# Patient Record
Sex: FEMALE | Employment: OTHER | URBAN - METROPOLITAN AREA
[De-identification: names, ages, dates, MRNs, and addresses within clinical notes are randomized per-mention and may not be internally consistent; named-entity substitution may affect disease eponyms.]

---

## 2017-01-03 ENCOUNTER — HOSPITAL ENCOUNTER (OUTPATIENT)
Dept: PHYSICAL THERAPY | Age: 77
Discharge: HOME | End: 2017-01-03
Payer: MEDICARE

## 2017-01-03 PROCEDURE — 92507 TX SP LANG VOICE COMM INDIV: CPT

## 2017-01-03 NOTE — PROGRESS NOTES
[address] [place] [address]. Speech Language Pathology: Daily Note      Patient Name: Pete Manriquez   1/3/2017   : 1940  [x]  Patient  Verified  Payor: Vic Gill / Plan: [place] MEDIC[place] & [place] / Product Type: Medicare /   In time:0900  Out time:1000  Total Treatment Time (min): 60  1:1 Treatment Time ( [address] Only): 60  Visit #: 4 of 8-10    SUBJECTIVE  Pain Level (0-10 scale): 0    Subjective functional status/changes:   Patient reported speech production is very inconsistent. Patient's cousin reported speech has improved immensly. OBJECTIVE  Treatment provided includes the following. Increase/Improve:  []  Voice Quality []  Expressive Language []  Oral Motor Skills   []  Vocal Loudness []  Auditory Comprehension []  Eating/Swallowing Skills   []  Vocal Cord Function []  Writing Skills []  Laryngeal/Pharyngeal Function   []  Resonance []  Reading Comprehension []   []  Breath Support/Coord. []  Cognitive-Linguistic Skills []   [x]  Speech Intelligibility []  Safety Awareness []   [x]  Articulation []  Attention []   []  Fluency []  Memory []     Decrease:  []  Dysphagia [x]  Apraxia []  Dysphonia   [x]  Dysarthria []  Dysfluency []  Cognitive Ling. Deficit   []  Aphasia []  Vocal Cord Dysfunction []  Dysphonia     Tasks Completed:  -2 syllable words 75% accuracy  -3 syllable words with 50% accuracy independently, 90% with mod-maxA    Progress towards goals:  Patient is demonstrating slow, but steady progress towards goals. Patient benefits from cues to decrease rate and break words into syllables. Patient has improved oral motor control with simple words and phrases in unstructured conversation. Patient has moments of speech (1-2 sentences) of no phonemic mis articulations. This demonstrates speech control is carrying over.  Patient would continue to benefit from skilled ST services to address speech deficits      HEP: 2 and 3 syllable words. Patient/Caregiver instruction/education: importance of completing HEP for carryover; pt verbalized comprehension. (minutes:10)        Pain Level (0-10 scale) post treatment: 0    ASSESSMENT  []   Improving appropriately and progressing toward goals  [x]   Improving slowly and progressing toward goals  []   Approximating goals/maximum potential  [x]   Continues to benefit from skilled therapy to address remaining functional deficits  []   Not progressing toward goals and plan of care will be adjusted    PLAN   [x]  Continue Plan of Care    []  See progress note/recertification  [x]  Upgrade activities as tolerated      []  Discharge due to:  []  Other:    Short-term Goals:  1. Complete 3 part commands with 80% accuracy with Vipul. 2. Produce 2 syllable words with 90% accuracy to improve functional communication with Vipul. 3. Produce 3 syllable words with 90% accuracy to improve functional communication with Vipul. 4. Produce phrases with 80% accuracy to improve functional communication with modA and models.     Leopold Lager M.S. SLP  1/3/2017, 8:52 AM

## 2017-01-05 ENCOUNTER — APPOINTMENT (OUTPATIENT)
Dept: PHYSICAL THERAPY | Age: 77
End: 2017-01-05
Payer: MEDICARE

## 2017-01-10 ENCOUNTER — APPOINTMENT (OUTPATIENT)
Dept: PHYSICAL THERAPY | Age: 77
End: 2017-01-10
Payer: MEDICARE

## 2017-01-12 ENCOUNTER — APPOINTMENT (OUTPATIENT)
Dept: PHYSICAL THERAPY | Age: 77
End: 2017-01-12
Payer: MEDICARE

## 2017-01-16 ENCOUNTER — OFFICE VISIT (OUTPATIENT)
Dept: FAMILY MEDICINE CLINIC | Age: 77
End: 2017-01-16

## 2017-01-16 ENCOUNTER — HOSPITAL ENCOUNTER (OUTPATIENT)
Dept: LAB | Age: 77
Discharge: HOME | End: 2017-01-16
Payer: MEDICARE

## 2017-01-16 VITALS
SYSTOLIC BLOOD PRESSURE: 142 MMHG | DIASTOLIC BLOOD PRESSURE: 76 MMHG | OXYGEN SATURATION: 95 % | TEMPERATURE: 98.3 F | WEIGHT: 117.2 LBS | HEIGHT: 64 IN | HEART RATE: 68 BPM | BODY MASS INDEX: 20.01 KG/M2

## 2017-01-16 DIAGNOSIS — E03.8 SUBCLINICAL HYPOTHYROIDISM: ICD-10-CM

## 2017-01-16 DIAGNOSIS — R35.0 FREQUENT URINATION: Primary | ICD-10-CM

## 2017-01-16 DIAGNOSIS — R32 URINARY INCONTINENCE, UNSPECIFIED TYPE: ICD-10-CM

## 2017-01-16 DIAGNOSIS — F41.8 DEPRESSION WITH ANXIETY: ICD-10-CM

## 2017-01-16 DIAGNOSIS — R79.89 ELEVATED TSH: ICD-10-CM

## 2017-01-16 DIAGNOSIS — R41.0 CONFUSION: ICD-10-CM

## 2017-01-16 LAB
BILIRUB UR QL STRIP: NEGATIVE
GLUCOSE UR-MCNC: NEGATIVE MG/DL
KETONES P FAST UR STRIP-MCNC: NEGATIVE MG/DL
PH UR STRIP: 5.5 [PH] (ref 4.6–8)
PROT UR QL STRIP: NEGATIVE MG/DL
SP GR UR STRIP: 1.03 (ref 1–1.03)
T3FREE SERPL-MCNC: 2.8 PG/ML (ref 2.3–4.2)
T4 FREE SERPL-MCNC: 0.9 NG/DL (ref 0.7–1.5)
TSH SERPL DL<=0.05 MIU/L-ACNC: 5.3 UIU/ML (ref 0.36–3.74)
UA UROBILINOGEN AMB POC: NORMAL (ref 0.2–1)
URINALYSIS CLARITY POC: CLEAR
URINALYSIS COLOR POC: YELLOW
URINE BLOOD POC: NORMAL
URINE LEUKOCYTES POC: NEGATIVE
URINE NITRITES POC: NEGATIVE

## 2017-01-16 PROCEDURE — 84443 ASSAY THYROID STIM HORMONE: CPT | Performed by: INTERNAL MEDICINE

## 2017-01-16 PROCEDURE — 84439 ASSAY OF FREE THYROXINE: CPT | Performed by: INTERNAL MEDICINE

## 2017-01-16 PROCEDURE — 84481 FREE ASSAY (FT-3): CPT | Performed by: INTERNAL MEDICINE

## 2017-01-16 PROCEDURE — 36415 COLL VENOUS BLD VENIPUNCTURE: CPT | Performed by: INTERNAL MEDICINE

## 2017-01-16 PROCEDURE — 86376 MICROSOMAL ANTIBODY EACH: CPT | Performed by: INTERNAL MEDICINE

## 2017-01-16 RX ORDER — AMOXICILLIN AND CLAVULANATE POTASSIUM 500; 125 MG/1; MG/1
1 TABLET, FILM COATED ORAL 2 TIMES DAILY
Qty: 14 TAB | Refills: 0 | Status: SHIPPED | OUTPATIENT
Start: 2017-01-16 | End: 2017-01-23

## 2017-01-16 NOTE — PROGRESS NOTES
Scott Dolan is a 64 y.o. female  Chief Complaint   Patient presents with    Bladder Infection     1. Have you been to the ER, urgent care clinic since your last visit? Hospitalized since your last visit? No    2. Have you seen or consulted any other health care providers outside of the [address] [place] [address] since your last visit? Include any pap smears or colon screening.  No

## 2017-01-16 NOTE — MR AVS SNAPSHOT
Visit Information Date & Time Provider Department Dept. Phone Encounter #  
 1/16/2017 11:30 AM Kyler Rutledge [facility] [phone] [id number] Your Appointments 1/27/2017  8:00 AM  
Follow Up with Kyler Rutledge MD  
[facility] [address] [place] [address]) Appt Note: 1 month f/u  
 [address] [facility] [id number] [phone]  
  
   
 [address] [facility]  
  
    
 6/5/2017 10:30 AM  
Follow Up with Kyler Rutledge MD  
[facility] [address] [place] [address]) Appt Note: F/u 6 months [address] [facility] [id number] [phone] Upcoming Health Maintenance Date Due ZOSTER VACCINE AGE 60> 7/23/1998 GLAUCOMA SCREENING Q2Y 7/23/2003 Pneumococcal 65+ Low/Medium Risk (2 of 2 - PCV13) 11/14/2017 MEDICARE YEARLY EXAM 12/10/2017 DTaP/Tdap/Td series (2 - Td) 11/14/2026 Allergies as of 1/16/2017  Review Complete On: 1/16/2017 By: Marilee Davalos LPN Severity Noted Reaction Type Reactions Codeine  10/20/2016    Itching Current Immunizations  Never Reviewed Name Date Pneumococcal Polysaccharide (PPSV-23) 11/14/2016 Not reviewed this visit You Were Diagnosed With   
  
 Codes Comments Frequent urination    -  Primary ICD-10-CM: R35.0 ICD-9-CM: 788.41 Urinary incontinence, unspecified type     ICD-10-CM: R32 
ICD-9-CM: 788.30 Elevated TSH     ICD-10-CM: R94.6 ICD-9-CM: 794.5 Confusion     ICD-10-CM: R41.0 ICD-9-CM: 298.9 Depression with anxiety     ICD-10-CM: F41.8 ICD-9-CM: 300.4 Vitals BP Pulse Temp Height(growth percentile) Weight(growth percentile) SpO2  
 142/76 (BP 1 Location: Left arm, BP Patient Position: Sitting) 68 98.3 °F (36.8 °C) 5' 4\" (1.626 m) 117 lb 3.2 oz (53.2 kg) 95% BMI OB Status Smoking Status 20.12 kg/m2 Hysterectomy Former Smoker BMI and BSA Data Body Mass Index Body Surface Area  
 20.12 kg/m 2 1.55 m 2 Preferred Pharmacy Pharmacy Name Phone [address], [address] [facility] [address] [phone] Your Updated Medication List  
  
   
This list is accurate as of: 1/16/17 12:00 PM.  Always use your most recent med list.  
  
  
  
  
 amoxicillin-clavulanate 500-125 mg per tablet Commonly known as:  AUGMENTIN Take 1 Tab by mouth two (2) times a day for 7 days. LaMICtal 25 mg tablet Generic drug:  lamoTRIgine Take 12.5 mg by mouth daily. mirabegron ER 25 mg ER tablet Commonly known as:  MYRBETRIQ Take 1 Tab by mouth daily. Prescriptions Sent to Pharmacy Refills  
 amoxicillin-clavulanate (AUGMENTIN) 500-125 mg per tablet 0 Sig: Take 1 Tab by mouth two (2) times a day for 7 days. Class: Normal  
 Pharmacy: [facility] [address] [place] [address], [address] [place] [address] AT [address] Ph #: [phone] Route: Oral  
  
We Performed the Following AMB POC URINALYSIS DIP STICK AUTO W/O MICRO [27624 CPT(R)] To-Do List   
 01/17/2017 9:00 AM  
  Appointment with Danika Maria at [facility] PT [address]  ([phone])  
  
 01/24/2017 9:00 AM  
  Appointment with Danika Maria [facility] PT [address] IM ([phone])  
  
 01/31/2017 9:00 AM  
  Appointment with Danika Maria [facility] PT [address] IM ([phone]) Introducing [facility]! Shruthi Sanchez introduces [employer] patient portal. Now you can access parts of your medical record, email your doctor's office, and request medication refills online. 1. In your internet browser, go to [email or web address] [employer] 2. Click on the First Time User? Click Here link in the Sign In box. You will see the New Member Sign Up page. 3. Enter your [employer] Access Code exactly as it appears below. You will not need to use this code after youve completed the sign-up process. If you do not sign up before the expiration date, you must request a new code. · [employer] Access Code: [email or web address] Expires: 1/18/2017  7:17 AM 
 
4. Enter the last four digits of your Social Security Number (xxxx) and Date of Birth (mm/dd/yyyy) as indicated and click Submit. You will be taken to the next sign-up page. 5. Create a T[employer] ID. This will be your [employer] login ID and cannot be changed, so think of one that is secure and easy to remember. 6. Create a [employer] password. You can change your password at any time. 7. Enter your Password Reset Question and Answer. This can be used at a later time if you forget your password. 8. Enter your e-mail address. You will receive e-mail notification when new information is available in [address] 9. Click Sign Up. You can now view and download portions of your medical record. 10. Click the Download Summary menu link to download a portable copy of your medical information. If you have questions, please visit the Frequently Asked Questions section of the [employer] website. Remember, [employer] is NOT to be used for urgent needs. For medical emergencies, dial 911. Now available from your iPhone and Android! Please provide this summary of care documentation to your next provider. Your primary care clinician is listed as Perla 13. If you have any questions after today's visit, please call [phone].

## 2017-01-16 NOTE — PROGRESS NOTES
Assessment/Plan:    1. Frequent urination, urine incontinence- seemed to resolve after last uti was treated.  -u/a positive for blood. Will retreat. Unfortunately, I can't send for culture b/c office is out of urine specimen cups. - AMB POC URINALYSIS DIP STICK AUTO W/O MICRO    2. Elevated TSH  -h/o being treated. Will likely need synthroid. Ck labs. 4. Confusion  -could be related to UTI or depression. However, I am concerned for neurologic process given gait instability (?NPH). Monitor closely. If doesn't improve after UTI treated, will do MRI head. 5. Depression with anxiety  - worsened over past week. Consider rx,however, would like to treat thyroid first to see if that helps. The plan was discussed with the patient. The patient verbalized understanding and is in agreement with the plan. All medication potential side effects were discussed with the patient. Health Maintenance:   Health Maintenance   Topic Date Due    ZOSTER VACCINE AGE 60>  07/23/1998    GLAUCOMA SCREENING Q2Y  07/23/2003    Pneumococcal 65+ Low/Medium Risk (2 of 2 - PCV13) 11/14/2017    MEDICARE YEARLY EXAM  12/10/2017    DTaP/Tdap/Td series (2 - Td) 11/14/2026    OSTEOPOROSIS SCREENING (DEXA)  Completed    INFLUENZA AGE 9 TO ADULT  Addressed       Gatito Gottlieb is a 64 y.o. female and presents with Bladder Infection     Subjective:  Pt recently treated for UTI 12/30 for urine incontinence. Daughter reports her sx improved, but then returned a few days after starting. Also having pelvic pain. Her daughter notes 5 day h/o worsening confusion. Patient acknowledges some confusion as well. No f/c. Her daughter notes gait instability that is worse as well. These sx started after she got a phone call from the office reminding her of her appointment. Her daughter states she \"couldn't handle the phone call\" b/c of dysarthria (word finding difficulty).   The daughter reports she went and laid down after that and stayed in bed for 2 days. Her daughter notes she's not sleeping well for the past 5 days as well. She will occ nap during the daytime. She has several awakenings at night. She states she's unhappy b/c she feels like she's a burden to her family. She admits to depression and anxiety. She is hesitant to take meds, although her daughter thinks she needs them. her tsh has been high. Notes she was on thyroid meds many years ago. ROS:  Constitutional: No recent weight change. No weakness/fatigue. No f/c. Cardiovascular: No CP/palpitations. No ALY/orthopnea/PND. Respiratory: No cough/sputum, dyspnea, wheezing. Gastointestinal: No dysphagia, reflux. No n/v. No constipation/diarrhea. No melena/rectal bleeding. Genitourinary: No dysuria, urinary hesitancy, nocturia, hematuria.  + incontinence. Neurological: No seizures/numbness/weakness. No paresthesias. The problem list was updated as a part of today's visit. 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 A01    Frequent falls R29.6    Right hand weakness M62.81    Contracture, right hand M24.541    Vertebral compression fracture (HCC) M48.50XA    Pure hypercholesterolemia E78.00    Full code status Z78.9    Subclinical hypothyroidism E03.9    OAB (overactive bladder) N32.81    Functional incontinence R39.81     The PSH, FH were reviewed.     SH:  Social History   Substance Use Topics    Smoking status: Former Smoker    Smokeless tobacco: Never Used    Alcohol use 0.6 oz/week     1 Glasses of wine per week       Medications/Allergies:  Current Outpatient Prescriptions on File Prior to Visit   Medication Sig Dispense Refill    mirabegron ER (Hodanflory Santana) 25 mg ER tablet Take 1 Tab by mouth daily. 30 Tab 0    lamoTRIgine (LAMICTAL) 25 mg tablet Take 12.5 mg by mouth daily. No current facility-administered medications on file prior to visit. Allergies   Allergen Reactions    Codeine Itching     Objective:  Visit Vitals    /76 (BP 1 Location: Left arm, BP Patient Position: Sitting)    Pulse 68    Temp 98.3 °F (36.8 °C)    Ht 5' 4\" (1.626 m)    Wt 117 lb 3.2 oz (53.2 kg)    SpO2 95%    BMI 20.12 kg/m2      Constitutional: Well developed, nourished, no distress, alert   CV: S1, S2.  RRR. No murmurs/rubs. No thrills palpated. No carotid bruits. Intact distal pulses. No edema. Pulm: No abnormalities on inspection. Clear to auscultation bilaterally. No wheezing/rhonchi. Normal effort. GI: Soft, nontender, nondistended. Normal active bowel sounds. No CVA tenderness. Neuro:  Speech mildly impaired with some word finding difficulty. wide based gait, difficulty getting out of chair. Psych: Appropriate affect, judgement and insight. Short-term memory intact. Labwork and Ancillary Studies:    CBC w/Diff  Lab Results   Component Value Date/Time    WBC 8.2 12/02/2016 09:40 AM    HGB 12.8 12/02/2016 09:40 AM    PLATELET 930 41/07/5049 09:40 AM         Basic Metabolic Profile/LFTs  Lab Results   Component Value Date/Time    Sodium 144 12/02/2016 09:40 AM    Potassium 4.3 12/02/2016 09:40 AM    Chloride 109 12/02/2016 09:40 AM    CO2 29 12/02/2016 09:40 AM    Anion gap 6 12/02/2016 09:40 AM    Glucose 73 12/02/2016 09:40 AM    BUN 19 12/02/2016 09:40 AM    Creatinine 0.92 12/02/2016 09:40 AM    BUN/Creatinine ratio 21 12/02/2016 09:40 AM    GFR est AA >60 12/02/2016 09:40 AM    GFR est non-AA 59 12/02/2016 09:40 AM    Calcium 8.9 12/02/2016 09:40 AM      Lab Results   Component Value Date/Time    ALT 13 12/02/2016 09:40 AM    AST 17 12/02/2016 09:40 AM    Alk.  phosphatase 92 12/02/2016 09:40 AM    Bilirubin, total 0.4 12/02/2016 09:40 AM       Cholesterol  Lab Results   Component Value Date/Time    Cholesterol, total 216 12/02/2016 09:40 AM    HDL Cholesterol 68 12/02/2016 09:40 AM    LDL, calculated 134.8 12/02/2016 09:40 AM    Triglyceride 66 12/02/2016 09:40 AM    CHOL/HDL Ratio 3.2 12/02/2016 09:40 AM

## 2017-01-17 ENCOUNTER — HOSPITAL ENCOUNTER (OUTPATIENT)
Dept: PHYSICAL THERAPY | Age: 77
Discharge: HOME | End: 2017-01-17
Payer: MEDICARE

## 2017-01-17 LAB
THYROGLOB AB SERPL-ACNC: <1 IU/ML (ref 0–0.9)
THYROPEROXIDASE AB SERPL-ACNC: 11 IU/ML (ref 0–34)

## 2017-01-17 PROCEDURE — 92507 TX SP LANG VOICE COMM INDIV: CPT

## 2017-01-17 PROCEDURE — G9186 MOTOR SPEECH GOAL STATUS: HCPCS

## 2017-01-17 PROCEDURE — G8999 MOTOR SPEECH CURRENT STATUS: HCPCS

## 2017-01-17 RX ORDER — LEVOTHYROXINE SODIUM 25 UG/1
25 TABLET ORAL
Qty: 90 TAB | Refills: 0 | Status: SHIPPED | OUTPATIENT
Start: 2017-01-17 | End: 2017-01-27 | Stop reason: ALTCHOICE

## 2017-01-17 NOTE — PROGRESS NOTES
[address] [place] [address]. Speech Language Pathology: Daily Note      Patient Name: Jonas Ayala   2017   : 1940  [x]  Patient  Verified  Payor: Tamie Breath / Plan: [place] MEDICARE PART A & B / Product Type: Medicare /   In time:0900  Out time:930  Total Treatment Time (min): 30  1:1 Treatment Time ( Only): 30  Visit #: 1 of 8-10    SUBJECTIVE  Pain Level (0-10 scale): 0    Subjective functional status/changes:   Patient reported her son stated her speech has improved     OBJECTIVE  Treatment provided includes the following. Increase/Improve:  []  Voice Quality []  Expressive Language []  Oral Motor Skills   []  Vocal Loudness []  Auditory Comprehension []  Eating/Swallowing Skills   []  Vocal Cord Function []  Writing Skills []  Laryngeal/Pharyngeal Function   []  Resonance []  Reading Comprehension []   []  Breath Support/Coord. []  Cognitive-Linguistic Skills []   [x]  Speech Intelligibility []  Safety Awareness []   [x]  Articulation []  Attention []   []  Fluency []  Memory []     Decrease:  []  Dysphagia [x]  Apraxia []  Dysphonia   [x]  Dysarthria []  Dysfluency []  Cognitive Ling. Deficit   []  Aphasia []  Vocal Cord Dysfunction []  Dysphonia     Tasks Completed:  -3 syllable words with 60% accuracy independently; 80% with Vipul; 100% with max A  - phrases with 70% accuracy independently. Progress towards goals:  Patient is demonstrating steady progress towards goals. Patient has demonstrated functional improvement in speech. She reports utilizing tactile cues at home, however demonstrates need to decrease rate to increase overarticulation. Patient continues to benefit from cues during structured tasks. Improved generalization of strategies and articulation.     HEP: Personal Phrases    Patient/Caregiver instruction/education: importance of completing HEP for carryover; pt verbalized comprehension. (minutes:5)        Pain Level (0-10 scale) post treatment: 0    ASSESSMENT  []   Improving appropriately and progressing toward goals  []   Improving slowly and progressing toward goals  []   Approximating goals/maximum potential  [x]   Continues to benefit from skilled therapy to address remaining functional deficits  []   Not progressing toward goals and plan of care will be adjusted    PLAN   [x]  Continue Plan of Care    []  See progress note/recertification  []  Upgrade activities as tolerated      []  Discharge due to:  []  Other:    Short-term Goals:  Patient will:  1. Produce 3 syllable words with 90% accuracy to improve functional communication with Vipul. 2. Produce phrases with 80% accuracy to improve functional communication with Vipul. 3. Produce sentences-passages with 80% accuracy to improve functional communication with Vipul.     Theodora Henley M.S. [facility] SLP  1/17/2017, 9:36 AM

## 2017-01-17 NOTE — PROGRESS NOTES
[facility] at [facility]  [facility] [address] [place] [address], [facility]  Ph: [phone] Fax: [phone]     Continued Plan of Care/ Re-certification for Speech Therapy Services    Gatito Gottlieb        Date: 2017  : 1940   Judi Lewis MD  Diagnosis: apraxia of speech; dysarthria    Onset Date: May 2016     Start of Care: 16  Visits from Start of Care: 4     Missed Visits: 0  Prior Level of Function: ; lives alone    The Plan of Care and following information is based on the patient's current status:  Goal: 1. Complete 3 part commands with 80% accuracy with Vipul. Status at last note/certification: not yet addressed  Current Status: not met    Goal: 2. Produce 2 syllable words with 90% accuracy to improve functional communication with Vipul. Status at last note/certification: Patient stated 2 syllable words with 70% accuracy independently; 90% with compensatory strategies. Current Status: met    Goal: 3. Produce 3 syllable words with 90% accuracy to improve functional communication with Vipul. Status at last note/certification: -3 syllable words with 50% accuracy independently, 90% with mod-maxA   Current Status: not met    Goal: 4. Produce phrases with 80% accuracy to improve functional communication with modA and models. Status at last note/certification: not yet addressed  Current Status: not met    Key functional changes: Patient is demonstrating slow, but steady progress towards goals. Patient has demonstrated improvement from decreasing rate, overarticulation, and intermittent tactile cues. Patient is able to self cue all cuing techniques with 2 syllable words and benefits from models with 3 syllable words. Patient is beginning to demonstrate generalization of techniques with moments of speech with no misarticulation (1-2 sentences).  Patient would benefit from continued skilled ST services to address apraxia of speech and dysarthria; hold cognitive therapy until after speech and language goals have been met. Problems/ barriers to goal attainment: None     Problem List: Dysarthria and Otherapraxia of speech; cognition    Treatment Plan: Dysarthria Treatment and OtherApraxia of Speech tx    Patient Goal (s) has been updated and includes: \"To keep getting better\"     G Codes:  [id number] Current  CJ= 20-39%   Goal  CI= 1-19%    The severity rating is based on the following outcomes:    National Outcomes Measures (NOMS)   Professional Judgement    Goals for this certification period to be accomplished in 8-10 treatments:  Patient will:  1. Produce 3 syllable words with 90% accuracy to improve functional communication with iVpul. 2. Produce phrases with 80% accuracy to improve functional communication with Vipul. 3. Produce sentences-passages with 80% accuracy to improve functional communication with min    Frequency / Duration: Patient to be seen 1-2 times per week for 4 weeks:    Assessment / Recommendations:Recommend continue skilled ST services focusing on apraxia of speech. Certification Period: 1/17/17- 2/14/17    Dianne Spain M.S. SLP 1/17/2017 8:55 AM      ________________________________________________________________________    I certify that the above Therapy Services are being furnished while the patient is under my care. I agree with the treatment plan and certify that this therapy is necessary. Y or N I have read the above and request that my patient continue as recommended.   Y or N I have read the above report and request that my patient continue therapy with the following changes/special instructions  Y or N I have read the above report and request that my patient be discharged from therapy    Physician's Signature:_________________ Date:___________Time:__________      Please sign and return via fax to [facility] at [facility]  Fax: [phone]

## 2017-01-17 NOTE — PROGRESS NOTES
Tell daughter that thyroid remains mildly low. However, given her sx, will treat with synthroid 25mcg. We will recheck her thyroid in 2 mos.

## 2017-01-19 ENCOUNTER — APPOINTMENT (OUTPATIENT)
Dept: PHYSICAL THERAPY | Age: 77
End: 2017-01-19
Payer: MEDICARE

## 2017-01-23 NOTE — TELEPHONE ENCOUNTER
Call placed to daughter, verified lab results with her again. Daughter states patient confusion did get better, but now it has gotten worse again, has been in bed for two days, swears she hears kids running up and down the stairs. Daughter states patient will be in to see you on the 27th. Will talk more about it then.

## 2017-01-23 NOTE — TELEPHONE ENCOUNTER
Pt's daughter called Paul Flaherty a follow up to labs\", she says that her mother was prescribed synthroid because her labs were low. She states her mother then received a call from \" a lab saying everything was normal\". I let her know that according to the notes, her labs were low so I was not sure who told her everything was normal. she then stated her mother is not taking her synthroid and that if Dr. Kleber Smith wants her to take it she can discuss it with her mother at her appt.

## 2017-01-24 ENCOUNTER — HOSPITAL ENCOUNTER (OUTPATIENT)
Dept: PHYSICAL THERAPY | Age: 77
Discharge: HOME | End: 2017-01-24
Payer: MEDICARE

## 2017-01-24 PROCEDURE — 92507 TX SP LANG VOICE COMM INDIV: CPT

## 2017-01-24 NOTE — PROGRESS NOTES
[address] [place] [address]. Speech Language Pathology: Daily Note      Patient Name: Jeni Merida   2017   : 1940  [x]  Patient  Verified  Payor: Stephen Drake / Plan: [place] MEDICARE PART A & B / Product Type: Medicare /   In time:0900  Out time:1000  Total Treatment Time (min): 60  1:1 Treatment Time (MC Only): 60  Visit #: 2 of 8-10    SUBJECTIVE  Pain Level (0-10 scale): 0    Subjective functional status/changes:   Patient reported reinvolvement in community, answering the telephone, and telling people she had a stroke. OBJECTIVE  Treatment provided includes the following. Increase/Improve:  []  Voice Quality []  Expressive Language []  Oral Motor Skills   []  Vocal Loudness []  Auditory Comprehension []  Eating/Swallowing Skills   []  Vocal Cord Function []  Writing Skills []  Laryngeal/Pharyngeal Function   []  Resonance []  Reading Comprehension []   []  Breath Support/Coord. []  Cognitive-Linguistic Skills []   []  Speech Intelligibility []  Safety Awareness []   [x]  Articulation []  Attention []   [x]  Fluency []  Memory []     Decrease:  []  Dysphagia [x]  Apraxia []  Dysphonia   [x]  Dysarthria []  Dysfluency []  Cognitive Ling. Deficit   []  Aphasia []  Vocal Cord Dysfunction []  Dysphonia     Tasks Completed:  -phrases with 3 syllable words with 90% accuracy independently; 100% with Vipul  -sentences with 80% accuracy independently; 100% with Vipul  -passages with 70% with min and and prompts. Progress towards goals:  Patient is demonstrating steady progress towards goals. Patient reports difficulty with reinvolvement in community due to emotional stress of moving here after her stroke. Patient encouraged to utilize her rehabilitated speech to explore new community; verbalized comprehension. Patient would continue to benefit from skilled ST services to address apraxia of speech/dysarthria.     HEP: x1 conversation in community a day    Patient/Caregiver instruction/education: importance of completing HEP for carryover; pt verbalized comprehension. (minutes:10)        Pain Level (0-10 scale) post treatment: 0    ASSESSMENT  []   Improving appropriately and progressing toward goals  [x]   Improving slowly and progressing toward goals  []   Approximating goals/maximum potential  [x]   Continues to benefit from skilled therapy to address remaining functional deficits  []   Not progressing toward goals and plan of care will be adjusted    PLAN   [x]  Continue Plan of Care    []  See progress note/recertification  []  Upgrade activities as tolerated      []  Discharge due to:  []  Other:    Short-term Goals:  Patient will:  1. Produce 3 syllable words with 90% accuracy to improve functional communication with Vipul. 2. Produce phrases with 80% accuracy to improve functional communication with Vipul. 3. Produce sentences-passages with 80% accuracy to improve functional communication with Vipul.     Bethanie Lombard [facility] SLP  1/24/2017, 9:54 AM

## 2017-01-26 ENCOUNTER — APPOINTMENT (OUTPATIENT)
Dept: PHYSICAL THERAPY | Age: 77
End: 2017-01-26
Payer: MEDICARE

## 2017-01-27 ENCOUNTER — OFFICE VISIT (OUTPATIENT)
Dept: FAMILY MEDICINE CLINIC | Age: 77
End: 2017-01-27

## 2017-01-27 VITALS
RESPIRATION RATE: 16 BRPM | HEIGHT: 64 IN | OXYGEN SATURATION: 97 % | HEART RATE: 51 BPM | DIASTOLIC BLOOD PRESSURE: 60 MMHG | BODY MASS INDEX: 19.97 KG/M2 | SYSTOLIC BLOOD PRESSURE: 126 MMHG | WEIGHT: 117 LBS

## 2017-01-27 DIAGNOSIS — E03.8 SUBCLINICAL HYPOTHYROIDISM: Primary | ICD-10-CM

## 2017-01-27 DIAGNOSIS — F34.1 DYSTHYMIA: ICD-10-CM

## 2017-01-27 RX ORDER — LEVOTHYROXINE SODIUM 25 UG/1
25 TABLET ORAL
Qty: 90 TAB | Refills: 0
Start: 2017-01-27 | End: 2017-02-08 | Stop reason: ALTCHOICE

## 2017-01-27 NOTE — PROGRESS NOTES
Assessment/Plan:    1. Subclinical hypothyroidism  -will do 2 month trial to see if it helps with cold intolerance and mood. 2. Dysthymia  -encouraged pt to get out of house and do activities as tolerated. Declined meds. The plan was discussed with the patient. The patient verbalized understanding and is in agreement with the plan. All medication potential side effects were discussed with the patient. Health Maintenance:   Health Maintenance   Topic Date Due    ZOSTER VACCINE AGE 60>  07/23/1998    GLAUCOMA SCREENING Q2Y  07/23/2003    Pneumococcal 65+ Low/Medium Risk (2 of 2 - PCV13) 11/14/2017    MEDICARE YEARLY EXAM  12/10/2017    DTaP/Tdap/Td series (2 - Td) 11/14/2026    OSTEOPOROSIS SCREENING (DEXA)  Completed    INFLUENZA AGE 9 TO ADULT  Addressed       Teodoro Mendez is a 64 y.o. female and presents with Enuresis (f/u )     Subjective:  Her daughter reports that after taking the antibiotic, her urinary incontinence resolved. She is no longer taking myrbetriq. She is taking cranberry juice and thinks that helps. She continues to express unhappiness in her situation. She feels that she is a burden to her family. She doesn't feel her sadness is an issue. She states it doesn't bother her. She doesn't like to take medications. PHQ2 score of 2. Hypothyroid - she didn't start treatment as recommended. No constipation or fatigue. But does have fatigue and depressed mood. She admits to cold intolerance. The patient has been off lamictal for a week. She had been placed on it for prophylaxis after stroke. The patient wishes to come off the medication. She hasn't seen Dr. Kayden srinivasan for trochanteric bursitis. States the pain is intermittent. And thinks the pain is related to a previous femur fracture. ROS:  Constitutional: No recent weight change. No weakness/fatigue. No f/c. Cardiovascular: No CP/palpitations. No ALY/orthopnea/PND.    Respiratory: No cough/sputum, dyspnea, wheezing. Gastointestinal: No dysphagia, reflux. No n/v. No constipation/diarrhea. No melena/rectal bleeding. Neurological: No seizures/numbness/weakness. No paresthesias. Psychiatric:  No depression, anxiety. The problem list was updated as a part of today's visit. 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 A01    Frequent falls R29.6    Right hand weakness M62.81    Contracture, right hand M24.541    Vertebral compression fracture (HCC) M48.50XA    Pure hypercholesterolemia E78.00    Full code status Z78.9    Subclinical hypothyroidism E03.9    OAB (overactive bladder) N32.81    Functional incontinence R39.81       The PSH, FH were reviewed. SH:  Social History   Substance Use Topics    Smoking status: Former Smoker    Smokeless tobacco: Never Used    Alcohol use 0.6 oz/week     1 Glasses of wine per week       Medications/Allergies:  Current Outpatient Prescriptions on File Prior to Visit   Medication Sig Dispense Refill    levothyroxine (SYNTHROID) 25 mcg tablet Take 1 Tab by mouth Daily (before breakfast). 90 Tab 0    mirabegron ER (MYRBETRIQ) 25 mg ER tablet Take 1 Tab by mouth daily. 30 Tab 0    lamoTRIgine (LAMICTAL) 25 mg tablet Take 12.5 mg by mouth daily. No current facility-administered medications on file prior to visit.          Allergies   Allergen Reactions    Codeine Itching       Objective:  Visit Vitals    /60 (BP 1 Location: Left arm, BP Patient Position: Sitting)    Pulse (!) 55    Resp 16    Ht 5' 4\" (1.626 m)    Wt 117 lb (53.1 kg)    SpO2 (!) 78%    BMI 20.08 kg/m2      Constitutional: Well developed, nourished, no distress, alert CV: S1, S2.  RRR. No murmurs/rubs. No thrills palpated. No carotid bruits. Intact distal pulses. No edema. Pulm: No abnormalities on inspection. Clear to auscultation bilaterally. No wheezing/rhonchi. Normal effort. Neuro:  No focal motor or sensory deficits. Speech dysarthric. Psych: Appropriate affect, judgement and insight. Short-term memory intact. Labwork and Ancillary Studies:    CBC w/Diff  Lab Results   Component Value Date/Time    WBC 8.2 12/02/2016 09:40 AM    HGB 12.8 12/02/2016 09:40 AM    PLATELET 081 61/68/3711 09:40 AM         Basic Metabolic Profile/LFTs  Lab Results   Component Value Date/Time    Sodium 144 12/02/2016 09:40 AM    Potassium 4.3 12/02/2016 09:40 AM    Chloride 109 12/02/2016 09:40 AM    CO2 29 12/02/2016 09:40 AM    Anion gap 6 12/02/2016 09:40 AM    Glucose 73 12/02/2016 09:40 AM    BUN 19 12/02/2016 09:40 AM    Creatinine 0.92 12/02/2016 09:40 AM    BUN/Creatinine ratio 21 12/02/2016 09:40 AM    GFR est AA >60 12/02/2016 09:40 AM    GFR est non-AA 59 12/02/2016 09:40 AM    Calcium 8.9 12/02/2016 09:40 AM      Lab Results   Component Value Date/Time    ALT 13 12/02/2016 09:40 AM    AST 17 12/02/2016 09:40 AM    Alk.  phosphatase 92 12/02/2016 09:40 AM    Bilirubin, total 0.4 12/02/2016 09:40 AM       Cholesterol  Lab Results   Component Value Date/Time    Cholesterol, total 216 12/02/2016 09:40 AM    HDL Cholesterol 68 12/02/2016 09:40 AM    LDL, calculated 134.8 12/02/2016 09:40 AM    Triglyceride 66 12/02/2016 09:40 AM    CHOL/HDL Ratio 3.2 12/02/2016 09:40 AM

## 2017-01-27 NOTE — PROGRESS NOTES
1. Have you been to the ER, urgent care clinic since your last visit? Hospitalized since your last visit? No   2. Have you seen or consulted any other health care providers outside of the [address] [place] [address] since your last visit? Include any pap smears or colon screening.    No

## 2017-01-27 NOTE — MR AVS SNAPSHOT
Visit Information Date & Time Provider Department Dept. Phone Encounter #  
 1/27/2017  8:00 AM Sharon White [facility] [phone] [id number] Your Appointments 2/1/2017 10:40 AM  
Office Visit with Mary Viera MD  
[facility] [place] [facility]) Appt Note: SM: Szulc pt - right hip injection; pt r/s from 01/13/2017  
 [address] [facility] [address] [phone]  
  
   
 [address] [facility]  
  
    
 6/5/2017 10:30 AM  
Follow Up with Sharon White MD  
[facility] [place] [facility] Appt Note: F/u 6 months [address] [facility] [address] [phone]  
  
   
 [address] Upcoming Health Maintenance Date Due ZOSTER VACCINE AGE 60> 7/23/1998 GLAUCOMA SCREENING Q2Y 7/23/2003 Pneumococcal 65+ Low/Medium Risk (2 of 2 - PCV13) 11/14/2017 MEDICARE YEARLY EXAM 12/10/2017 DTaP/Tdap/Td series (2 - Td) 11/14/2026 Allergies as of 1/27/2017  Review Complete On: 1/27/2017 By: Sharon White MD  
  
 Severity Noted Reaction Type Reactions Codeine  10/20/2016    Itching Current Immunizations  Never Reviewed Name Date Pneumococcal Polysaccharide (PPSV-23) 11/14/2016 Not reviewed this visit You Were Diagnosed With   
  
 Codes Comments Subclinical hypothyroidism    -  Primary ICD-10-CM: E03.9 ICD-9-CM: 244.8 Dysthymia     ICD-10-CM: F34.1 ICD-9-CM: 300.4 Vitals BP Pulse Resp Height(growth percentile) Weight(growth percentile) SpO2  
 126/60 (BP 1 Location: Left arm, BP Patient Position: Sitting) (!) 55 16 5' 4\" (1.626 m) 117 lb (53.1 kg) (!) 78% BMI OB Status Smoking Status 20.08 kg/m2 Hysterectomy Former Smoker Vitals History BMI and BSA Data Body Mass Index Body Surface Area 20.08 kg/m 2 1.55 m 2 Preferred Pharmacy Pharmacy Name Phone [address], [address] [facility] AT [address] [phone] Your Updated Medication List  
  
   
This list is accurate as of: 1/27/17  8:29 AM.  Always use your most recent med list.  
  
  
  
  
 levothyroxine 25 mcg tablet Commonly known as:  SYNTHROID Take 1 Tab by mouth Daily (before breakfast). To-Do List   
 01/31/2017 9:00 AM  
  Appointment with Roddy Cooney at [facility] [address] ([phone])  
  
 02/14/2017 9:00 AM  
  Appointment with Roddy Cooney at [facility] [address] [address] [address] [facility] ([phone])  
  
 02/21/2017 9:30 AM  
  Appointment with Roddy Cooney at [facility] [address] ([phone])  
  
 02/28/2017 9:00 AM  
  Appointment with Roddy Cooney at [facility] [address] [facility] ([phone]) Introducing [facility] SERVICES! Shade Sharma introduces [employer] patient portal. Now you can access parts of your medical record, email your doctor's office, and request medication refills online. 1. In your internet browser, go to [email or web address] Nett 2. Click on the First Time User? Click Here link in the Sign In box. You will see the New Member Sign Up page. 3. Enter your [employer] Access Code exactly as it appears below. You will not need to use this code after youve completed the sign-up process. If you do not sign up before the expiration date, you must request a new code. · [employer] Access Code: [email or web address] Expires: 4/24/2017  8:57 AM 
 
4. Enter the last four digits of your Social Security Number (xxxx) and Date of Birth (mm/dd/yyyy) as indicated and click Submit. You will be taken to the next sign-up page. 5. Create a [employer] ID. This will be your [employer] login ID and cannot be changed, so think of one that is secure and easy to remember. 6. Create a Gizmo5 password. You can change your password at any time. 7. Enter your Password Reset Question and Answer. This can be used at a later time if you forget your password. 8. Enter your e-mail address. You will receive e-mail notification when new information is available in [address] 9. Click Sign Up. You can now view and download portions of your medical record. 10. Click the Download Summary menu link to download a portable copy of your medical information. If you have questions, please visit the Frequently Asked Questions section of the [employer] website. Remember, [employer] is NOT to be used for urgent needs. For medical emergencies, dial 911. Now available from your iPhone and Android! Please provide this summary of care documentation to your next provider. Your primary care clinician is listed as Perla 13. If you have any questions after today's visit, please call [phone].

## 2017-01-31 ENCOUNTER — APPOINTMENT (OUTPATIENT)
Dept: PHYSICAL THERAPY | Age: 77
End: 2017-01-31
Payer: MEDICARE

## 2017-01-31 ENCOUNTER — TELEPHONE (OUTPATIENT)
Dept: FAMILY MEDICINE CLINIC | Age: 77
End: 2017-01-31

## 2017-01-31 NOTE — TELEPHONE ENCOUNTER
Call returned to daughter, daughter states patient with complaints of pain in her head. Daughter states that Dr Marquise Whitehead stated that if she had pain in head to let her know. Offered daughter appt for patient to see another provider today, daughter declined appt. Daughter also requesting Home health referral for PT and OT because patient to hard to get out for appts. Daly advise.

## 2017-01-31 NOTE — TELEPHONE ENCOUNTER
Megan Quiroz the pt daughter called to speak with a nurse about pt. Stated that the pt was \"having pain her in her head\" would like to talk with a  Nurse.  Megan Quiroz left a contact number where she could be reached [phone]

## 2017-02-01 DIAGNOSIS — Z74.09 IMPAIRED MOBILITY: Primary | ICD-10-CM

## 2017-02-01 DIAGNOSIS — Z78.9 IMPAIRED MOBILITY AND ACTIVITIES OF DAILY LIVING: ICD-10-CM

## 2017-02-01 DIAGNOSIS — Z74.09 IMPAIRED MOBILITY AND ACTIVITIES OF DAILY LIVING: ICD-10-CM

## 2017-02-07 ENCOUNTER — APPOINTMENT (OUTPATIENT)
Dept: PHYSICAL THERAPY | Age: 77
End: 2017-02-07

## 2017-02-07 ENCOUNTER — TELEPHONE (OUTPATIENT)
Dept: FAMILY MEDICINE CLINIC | Age: 77
End: 2017-02-07

## 2017-02-07 NOTE — TELEPHONE ENCOUNTER
Yenifer Green with [employer] called stating they received a referral for this pt and the diagnosis code isn't \"billable\". She is requesting it to be updated to have the diagnosis code for \"Gait\" or something along those lines.  Please generate and re fax

## 2017-02-08 ENCOUNTER — OFFICE VISIT (OUTPATIENT)
Dept: FAMILY MEDICINE CLINIC | Age: 77
End: 2017-02-08

## 2017-02-08 VITALS
BODY MASS INDEX: 20.32 KG/M2 | RESPIRATION RATE: 16 BRPM | DIASTOLIC BLOOD PRESSURE: 60 MMHG | TEMPERATURE: 98.1 F | HEART RATE: 71 BPM | OXYGEN SATURATION: 96 % | WEIGHT: 119 LBS | HEIGHT: 64 IN | SYSTOLIC BLOOD PRESSURE: 128 MMHG

## 2017-02-08 DIAGNOSIS — F34.1 DYSTHYMIA: ICD-10-CM

## 2017-02-08 DIAGNOSIS — I61.9 HEMORRHAGIC CEREBROVASCULAR ACCIDENT (CVA) (HCC): Primary | ICD-10-CM

## 2017-02-08 RX ORDER — MIRABEGRON 25 MG/1
25 TABLET, FILM COATED, EXTENDED RELEASE ORAL AS NEEDED
Refills: 0 | COMMUNITY
Start: 2016-12-30 | End: 2017-02-08 | Stop reason: ALTCHOICE

## 2017-02-08 NOTE — MR AVS SNAPSHOT
Visit Information Date & Time Provider Department Dept. Phone Encounter #  
 2/8/2017  1:00 PM Chelsea Chavez, 3 [facility] [phone] [id number] Your Appointments 3/27/2017 10:30 AM  
Follow Up with Chelsea Chavez MD  
3 [facility]) Appt Note: 2 month f/u; r/s for 30 mins [address] [facility] [address] [phone]  
  
   
 [address] [facility]  
  
    
 6/5/2017 10:30 AM  
Follow Up with Chelsea Chavez MD  
3 [facility]) Appt Note: F/u 6 months; r/s for 30 mins [address] [facility] [id number] [phone] Upcoming Health Maintenance Date Due ZOSTER VACCINE AGE 60> 7/23/1998 GLAUCOMA SCREENING Q2Y 7/23/2003 Pneumococcal 65+ Low/Medium Risk (2 of 2 - PCV13) 11/14/2017 MEDICARE YEARLY EXAM 12/10/2017 DTaP/Tdap/Td series (2 - Td) 11/14/2026 Allergies as of 2/8/2017  Review Complete On: 2/8/2017 By: Chelsea Chavez MD  
  
 Severity Noted Reaction Type Reactions Codeine  10/20/2016    Itching Current Immunizations  Never Reviewed Name Date Pneumococcal Polysaccharide (PPSV-23) 11/14/2016 Not reviewed this visit You Were Diagnosed With   
  
 Codes Comments Hemorrhagic cerebrovascular accident (CVA) ([facility] [address]    -  Primary ICD-10-CM: I61.9 ICD-9-CM: 055 Dysthymia     ICD-10-CM: F34.1 ICD-9-CM: 300.4 Vitals BP Pulse Temp Resp Height(growth percentile) Weight(growth percentile) 128/60 (BP 1 Location: Left arm, BP Patient Position: Sitting) 71 98.1 °F (36.7 °C) (Oral) 16 5' 4\" (1.626 m) 119 lb (54 kg) SpO2 BMI OB Status Smoking Status 96% 20.43 kg/m2 Hysterectomy Former Smoker BMI and BSA Data Body Mass Index Body Surface Area  
 20.43 kg/m 2 1.56 m 2 Preferred Pharmacy Pharmacy Name Phone [address], [address] [place] AT [address] [phone] Your Updated Medication List  
  
Notice  As of 2/8/2017  2:24 PM  
 You have not been prescribed any medications. To-Do List   
 02/14/2017 9:00 AM  
  Appointment with Chelsy Wong at [facility] [address] ([phone])  
  
 02/21/2017 9:30 AM  
  [facility] [address] [facility] ([phone])  
  
 02/28/2017 9:00 AM  
  Appointment with [facility] PT [address] ([phone]) Introducing [facility] SERVICES! Rosalino Jarrell introduces [employer] patient portal. Now you can access parts of your medical record, email your doctor's office, and request medication refills online. 1. In your internet browser, go to [email or web address] 2. Click on the First Time User? Click Here link in the Sign In box. You will see the New Member Sign Up page. 3. Enter your [employer] Access Code exactly as it appears below. You will not need to use this code after youve completed the sign-up process. If you do not sign up before the expiration date, you must request a new code. · [employer] Access Code: [email or web address] Expires: 4/24/2017  8:57 AM 
 
4. Enter the last four digits of your Social Security Number (xxxx) and Date of Birth (mm/dd/yyyy) as indicated and click Submit. You will be taken to the next sign-up page. 5. Create a Smart[employer] Learningt ID. This will be your [employer] login ID and cannot be changed, so think of one that is secure and easy to remember. 6. Create a [employer] Learning[employer] password. You can change your password at any time. 7. Enter your Password Reset Question and Answer. This can be used at a later time if you forget your password. 8. Enter your e-mail address. You will receive e-mail notification when new information is available in [address] 9. Click Sign Up. You can now view and download portions of your medical record. 10. Click the Download Summary menu link to download a portable copy of your medical information. If you have questions, please visit the Frequently Asked Questions section of the [employer] website. Remember, [employer] is NOT to be used for urgent needs. For medical emergencies, dial 911. Now available from your iPhone and Android! Please provide this summary of care documentation to your next provider. Your primary care clinician is listed as Perla 13. If you have any questions after today's visit, please call [phone].

## 2017-02-08 NOTE — PROGRESS NOTES
1. Have you been to the ER, urgent care clinic since your last visit? Hospitalized since your last visit? No     2. Have you seen or consulted any other health care providers outside of the [address] [place] [address] since your last visit? Include any pap smears or colon screening. No     Pt accompanied by daughter and sister.

## 2017-02-08 NOTE — PROGRESS NOTES
Assessment/Plan:    1. Hemorrhagic cerebrovascular accident (CVA) ([facility] [address]  -PT/OT/ST    2. Dysthymia  - I encouraged pt to move to [place] b/c it will help with mood if she is around her peers and more involoved in activities instead of staying home alone. A total of 35 minutes was spent with the patient of which 30 minutes was spent in counseling regarding prognosis from CVA, implications for moving to [place], dysthymia/mild depression. The plan was discussed with the patient. The patient verbalized understanding and is in agreement with the plan. All medication potential side effects were discussed with the patient. Health Maintenance:   Health Maintenance   Topic Date Due    ZOSTER VACCINE AGE 60>  07/23/1998    GLAUCOMA SCREENING Q2Y  07/23/2003    Pneumococcal 65+ Low/Medium Risk (2 of 2 - PCV13) 11/14/2017    MEDICARE YEARLY EXAM  12/10/2017    DTaP/Tdap/Td series (2 - Td) 11/14/2026    OSTEOPOROSIS SCREENING (DEXA)  Completed    INFLUENZA AGE 9 TO ADULT  Addressed       Teodoro Mendez is a 64 y.o. female and presents with Medication Evaluation     Subjective:  Pt is brought in by sister and daughter. They are requesting PT and ST in the home b/c transportation is a difficulty. She is looking at moving to an assisted living facility. We discussed her frustration with not being able to do things. She doesn't have a lot of socialization outside the home b/c her daughter/caretaker works. ROS:  Constitutional: No recent weight change. No weakness/fatigue. No f/c. Cardiovascular: No CP/palpitations. No ALY/orthopnea/PND. Respiratory: No cough/sputum, dyspnea, wheezing. Gastointestinal: No dysphagia, reflux. No n/v. No constipation/diarrhea. No melena/rectal bleeding. Neurological: No seizures/numbness/weakness. No paresthesias. Psychiatric:  No depression, anxiety. The problem list was updated as a part of today's visit.   Patient Active Problem List   Diagnosis Code    Hemorrhagic cerebrovascular accident (CVA) ([facility] [address] I61.9    CVA, old, dysarthria I69.80    CVA, old, homonymous hemianopsia I66.80, H48.5    Paroxysmal atrial fibrillation (HCC) I48.0    Contraindication to anticoagulation therapy Z53.09    Petit mal seizure status ([facility] [address] G40. A01    Frequent falls R29.6    Right hand weakness M62.81    Contracture, right hand M24.541    Vertebral compression fracture (HCC) M48.50XA    Pure hypercholesterolemia E78.00    Full code status Z78.9    Subclinical hypothyroidism E03.9    OAB (overactive bladder) N32.81    Dysthymia F34.1     The PSH, FH were reviewed. SH:  Social History   Substance Use Topics    Smoking status: Former Smoker    Smokeless tobacco: Never Used    Alcohol use 0.6 oz/week     1 Glasses of wine per week     /................ Mliss Maher Medications/Allergies:  No current outpatient prescriptions on file prior to visit. No current facility-administered medications on file prior to visit. Allergies   Allergen Reactions    Codeine Itching     Objective:  Visit Vitals    /60 (BP 1 Location: Left arm, BP Patient Position: Sitting)    Pulse 71    Temp 98.1 °F (36.7 °C) (Oral)    Resp 16    Ht 5' 4\" (1.626 m)    Wt 119 lb (54 kg)    SpO2 96%    BMI 20.43 kg/m2      Constitutional: Well developed, nourished, no distress, alert, thin   CV: S1, S2.  RRR. No murmurs/rubs. No thrills palpated. No carotid bruits. Intact distal pulses. No edema. Pulm: No abnormalities on inspection. Clear to auscultation bilaterally. No wheezing/rhonchi. Normal effort. GI: Soft, nontender, nondistended. Normal active bowel sounds. MS: Gait normal.  Some difficulty with get up and go. Joints without deformity/tenderness. Neuro: Speech dysarthric with some word finding. Psych: Appropriate affect, judgement and insight.          Labwork and Ancillary Studies:    CBC w/Diff  Lab Results   Component Value Date/Time    WBC 8.2 12/02/2016 09:40 AM    HGB 12.8 12/02/2016 09:40 AM    PLATELET 919 42/20/1085 09:40 AM         Basic Metabolic Profile/LFTs  Lab Results   Component Value Date/Time    Sodium 144 12/02/2016 09:40 AM    Potassium 4.3 12/02/2016 09:40 AM    Chloride 109 12/02/2016 09:40 AM    CO2 29 12/02/2016 09:40 AM    Anion gap 6 12/02/2016 09:40 AM    Glucose 73 12/02/2016 09:40 AM    BUN 19 12/02/2016 09:40 AM    Creatinine 0.92 12/02/2016 09:40 AM    BUN/Creatinine ratio 21 12/02/2016 09:40 AM    GFR est AA >60 12/02/2016 09:40 AM    GFR est non-AA 59 12/02/2016 09:40 AM    Calcium 8.9 12/02/2016 09:40 AM      Lab Results   Component Value Date/Time    ALT (SGPT) 13 12/02/2016 09:40 AM    AST (SGOT) 17 12/02/2016 09:40 AM    Alk.  phosphatase 92 12/02/2016 09:40 AM    Bilirubin, total 0.4 12/02/2016 09:40 AM       Cholesterol  Lab Results   Component Value Date/Time    Cholesterol, total 216 12/02/2016 09:40 AM    HDL Cholesterol 68 12/02/2016 09:40 AM    LDL, calculated 134.8 12/02/2016 09:40 AM    Triglyceride 66 12/02/2016 09:40 AM    CHOL/HDL Ratio 3.2 12/02/2016 09:40 AM

## 2017-02-14 ENCOUNTER — APPOINTMENT (OUTPATIENT)
Dept: PHYSICAL THERAPY | Age: 77
End: 2017-02-14

## 2017-02-21 ENCOUNTER — APPOINTMENT (OUTPATIENT)
Dept: PHYSICAL THERAPY | Age: 77
End: 2017-02-21

## 2017-02-28 ENCOUNTER — APPOINTMENT (OUTPATIENT)
Dept: PHYSICAL THERAPY | Age: 77
End: 2017-02-28

## 2017-02-28 NOTE — PROGRESS NOTES
[facility] at [address]  Ph: [phone] Fax: [phone]     Speech Pathology -- Discharge Summary    Name: Gatito Gottlieb        Date: 2017   : 1940   MD: Judi Lewis MD    Treatment Diagnosis: apraxia of speech; dysarthria   Start of Care: 16    Visits from Start of Care:6  Missed Visits: 0    Summary of Care:Patient has demonstrated improvement from decreasing rate, overarticulation, and intermittent tactile cues. Patient was able to self cue all techniques with 2 syllable words and benefits from models with 3 syllable words. Patient is beginning to demonstrate generalization of techniques with moments of speech with no misarticulation (1-2 sentences). Patient was demonstrating steady improvement towards goals, however patient then fell and broke her pelvic bone at home so is unable to attend further sessions. Patient was inquiring of home health ST services prior to accident. At this time, I will discharge from OP speech therapy services and recommend follow up with [address] [place] [address]. Thank you    Assessment / Recommendations:   Discontinue therapy due to lack of attendance or compliance. Blas Pete [facility] SLP 2017 10:38 AM    ________________________________________________________________________    NOTE TO PHYSICIAN:  Please complete the following and fax to: [facility] at  [facility] at [phone]    . Retain this original for your records. If you are unable to process this request in   24 hours, please contact our office.      ____ I have read the above report and request that my patient continue therapy with the following changes/special instructions:  ____ I have read the above report and request that my patient be discharged from therapy    Physician's Signature:_____________________ Date:___________Time:__________

## 2017-06-05 ENCOUNTER — OFFICE VISIT (OUTPATIENT)
Dept: FAMILY MEDICINE CLINIC | Age: 77
End: 2017-06-05

## 2017-06-05 VITALS
OXYGEN SATURATION: 98 % | BODY MASS INDEX: 21.34 KG/M2 | RESPIRATION RATE: 14 BRPM | SYSTOLIC BLOOD PRESSURE: 122 MMHG | HEART RATE: 77 BPM | DIASTOLIC BLOOD PRESSURE: 60 MMHG | HEIGHT: 64 IN | WEIGHT: 125 LBS | TEMPERATURE: 97.6 F

## 2017-06-05 DIAGNOSIS — E03.8 SUBCLINICAL HYPOTHYROIDISM: Primary | ICD-10-CM

## 2017-06-05 DIAGNOSIS — N39.46 MIXED INCONTINENCE URGE AND STRESS: ICD-10-CM

## 2017-06-05 DIAGNOSIS — E55.9 VITAMIN D DEFICIENCY: ICD-10-CM

## 2017-06-05 PROBLEM — I44.0 FIRST DEGREE AV BLOCK: Status: ACTIVE | Noted: 2017-06-05

## 2017-06-05 RX ORDER — ESCITALOPRAM OXALATE 10 MG/1
10 TABLET ORAL DAILY
COMMUNITY
End: 2017-07-14 | Stop reason: SDUPTHER

## 2017-06-05 RX ORDER — CHOLECALCIFEROL (VITAMIN D3) 125 MCG
CAPSULE ORAL
COMMUNITY
End: 2018-01-01 | Stop reason: ALTCHOICE

## 2017-06-05 RX ORDER — LEVOTHYROXINE SODIUM 50 UG/1
TABLET ORAL
COMMUNITY
End: 2017-07-14 | Stop reason: ALTCHOICE

## 2017-06-05 NOTE — PROGRESS NOTES
Santos Brownlee is a 64 y.o. female  Here today for follow-up. 1. Have you been to the ER, urgent care clinic since your last visit? Hospitalized since your last visit? No    2. Have you seen or consulted any other health care providers outside of the [address] [place] [address] since your last visit? Include any pap smears or colon screening.  No

## 2017-06-05 NOTE — PATIENT INSTRUCTIONS
Bladder Training: Care Instructions  Your Care Instructions  Bladder training is used to treat urge incontinence and stress incontinence. Urge incontinence means that the need to urinate comes on so fast that you can't get to a toilet in time. Stress incontinence means that you leak urine because of pressure on your bladder. For example, it may happen when you laugh, cough, or lift something heavy. Bladder training can increase how long you can wait before you have to urinate. It can also help your bladder hold more urine. And it can give you better control over the urge to urinate. It is important to remember that bladder training takes a few weeks to a few months to make a difference. You may not see results right away, but don't give up. Follow-up care is a key part of your treatment and safety. Be sure to make and go to all appointments, and call your doctor if you are having problems. It's also a good idea to know your test results and keep a list of the medicines you take. How can you care for yourself at home? Work with your doctor to come up with a bladder training program that is right for you. You may use one or more of the following methods. Delayed urination  · In the beginning, try to keep from urinating for 5 minutes after you first feel the need to go. · While you wait, take deep, slow breaths to relax. Kegel exercises can also help you delay the need to go to the bathroom. · After some practice, when you can easily wait 5 minutes to urinate, try to wait 10 minutes before you urinate. · Slowly increase the waiting period until you are able to control when you have to urinate. Scheduled urination  · Empty your bladder when you first wake up in the morning. · Schedule times throughout the day when you will urinate. · Start by going to the bathroom every hour, even if you don't need to go. · Slowly increase the time between trips to the bathroom.   · When you have found a schedule that works well for you, keep doing it. · If you wake up during the night and have to urinate, do it. Apply your schedule to waking hours only. Kegel exercises  These tighten and strengthen pelvic muscles, which can help you control the flow of urine. To do Kegel exercises:  · Squeeze the same muscles you would use to stop your urine. Your belly and thighs should not move. · Hold the squeeze for 3 seconds, and then relax for 3 seconds. · Start with 3 seconds. Then add 1 second each week until you are able to squeeze for 10 seconds. · Repeat the exercise 10 to 15 times a session. Do three or more sessions a day. When should you call for help? Watch closely for changes in your health, and be sure to contact your doctor if:  · Your incontinence is getting worse. · You do not get better as expected. Where can you learn more? Go to http://estefanía-laila.info/. Enter I651 in the search box to learn more about \"Bladder Training: Care Instructions. \"  Current as of: August 12, 2016  Content Version: 11.2  © 9301-9792 [employer]. Care instructions adapted under license by [employer] (which disclaims liability or warranty for this information). If you have questions about a medical condition or this instruction, always ask your healthcare professional. [address] any warranty or liability for your use of this information. Kegel Exercises: Care Instructions  Your Care Instructions  Kegel exercises strengthen muscles around the bladder. These muscles control the flow of urine. Kegel exercises are sometime called \"pelvic floor\" exercises. They can help prevent urine leakage and keep the pelvic organs in place. A woman who just had a baby might want to try Kegel exercises. They can strengthen pelvic muscles that have been weakened by pregnancy and childbirth. A man or woman may use Kegel exercises to treat urine leakage.   You do Kegel exercises by tightening the muscles you use when you urinate. You will likely need to do these exercises for several weeks to get better. Follow-up care is a key part of your treatment and safety. Be sure to make and go to all appointments, and call your doctor if you are having problems. It's also a good idea to know your test results and keep a list of the medicines you take. How can you care for yourself at home? · Do Kegel exercises. ¨ Find the muscles you need to strengthen. To do this, tighten the muscles that stop your urine while you are going to the bathroom. These are the same muscles you squeeze during Kegel exercises. ¨ Squeeze the muscles as hard as you can. Your belly and thighs should not move. ¨ Hold the squeeze for 3 seconds. Then relax for 3 seconds. ¨ Start with 3 seconds, and then add 1 second each week until you are able to squeeze for 10 seconds. ¨ Repeat the exercise 10 to 15 times for each session. Do three or more sessions each day. · You can check to see if you are using the right muscles. Place a finger in your vagina and squeeze around it. You are doing them right when you feel pressure around your finger. Your doctor may also suggest that you put special weights in your vagina while you do the exercises. · Do not smoke. It can irritate the bladder. If you need help quitting, talk to your doctor about stop-smoking programs and medicines. These can increase your chances of quitting for good. Where can you learn more? Go to http://estefanía-laila.info/. Enter D357 in the search box to learn more about \"Kegel Exercises: Care Instructions. \"  Current as of: August 12, 2016  Content Version: 11.2  © 2365-4067 [employer]. Care instructions adapted under license by [employer] (which disclaims liability or warranty for this information).  If you have questions about a medical condition or this instruction, always ask your healthcare professional. [employer] disclaims any warranty or liability for your use of this information.

## 2017-06-05 NOTE — PROGRESS NOTES
Assessment/Plan:    1. Subclinical hypothyroidism  -hold thyroid medication. Ck labs in 2 mos. Pt wishes to stay off medication, in addition, the med is making her dizzy. - TSH 3RD GENERATION; Future  - T4, FREE; Future  - T3, FREE; Future    2. Vitamin D deficiency  -on 2000 units daily. - VITAMIN D, 25 HYDROXY; Future    3. Mixed incontinence urge and stress  -vesicare. Start kegels. 4. Dysthymia  - will discuss decreasing dose of lexapro at next visit. The plan was discussed with the patient. The patient verbalized understanding and is in agreement with the plan. All medication potential side effects were discussed with the patient. Health Maintenance:   Health Maintenance   Topic Date Due    ZOSTER VACCINE AGE 60>  07/23/1998    GLAUCOMA SCREENING Q2Y  07/23/2003    INFLUENZA AGE 9 TO ADULT  08/01/2017    Pneumococcal 65+ Low/Medium Risk (2 of 2 - PCV13) 11/14/2017    MEDICARE YEARLY EXAM  12/10/2017    DTaP/Tdap/Td series (2 - Td) 11/14/2026    OSTEOPOROSIS SCREENING (DEXA)  Completed       Germán Segura is a 64 y.o. female and presents with Follow Up Chronic Condition     Subjective:    Subclinical hypothyroid -was started on synthroid to see if it helped with cold intol and mood. Notes her dose was recently increased. She c/o dizziness with the medication. Dysthymia - on lexapro. Her daughter notes her mood is improved and she's not crying as much. She wishes to come off the medication. States she is doing much better and no longer depressed. OAB - on vesicare. She continues to have stress incontinence. The OAB component seems to be improved. Had a fall 2/9 and had broken hip. She went to rehab. She is now living at the [facility] and had been seeing the physician there. She no longer wants to be under his care. She is now using a walker. She is still getting OT/ST. Has completed PT.     The patient again expresses that she doesn't want to be on medications. ROS:  Constitutional: No recent weight change. No weakness/fatigue. No f/c. Skin: No rashes, change in nails/hair, itching   HENT: No HA, dizziness. No hearing loss/tinnitus. No nasal congestion/discharge. Eyes: No change in vision, double/blurred vision or eye pain/redness. Cardiovascular: No CP/palpitations. No ALY/orthopnea/PND. Respiratory: No cough/sputum, dyspnea, wheezing. Gastointestinal: No dysphagia, reflux. No n/v. No constipation/diarrhea. No melena/rectal bleeding. Genitourinary: No dysuria, urinary hesitancy, nocturia, hematuria. No incontinence. Musculoskeletal: No joint pain/stiffness. No muscle pain/tenderness. Endo: No heat/cold intolerance, no polyuria/polydypsia. Heme: No h/o anemia. No easy bleeding/bruising. Allergy/Immunology: No seasonal rhinitis. Denies frequent colds, sinus/ear infections. Neurological: No seizures/numbness/weakness. No paresthesias. Psychiatric:  No depression, anxiety. The problem list was updated as a part of today's visit. 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 A01    Frequent falls R29.6    Right hand weakness R29.898    Contracture, right hand M24.541    Vertebral compression fracture (HCC) M48.50XA    Pure hypercholesterolemia E78.00    Full code status Z78.9    Subclinical hypothyroidism E03.9    OAB (overactive bladder) N32.81    Dysthymia F34.1       The PSH, FH were reviewed.     SH:  Social History   Substance Use Topics    Smoking status: Former Smoker    Smokeless tobacco: Never Used    Alcohol use 0.6 oz/week     1 Glasses of wine per week     Medications/Allergies:    Current Outpatient Prescriptions:    escitalopram oxalate (LEXAPRO) 10 mg tablet, Take 10 mg by mouth daily. , Disp: , Rfl:     levothyroxine (SYNTHROID) 50 mcg tablet, Take  by mouth Daily (before breakfast). , Disp: , Rfl:     cholecalciferol, vitamin D3, 2,000 unit tab, Take  by mouth., Disp: , Rfl:     mirabegron ER (MYRBETRIQ) 25 mg ER tablet, Take 25 mg by mouth daily. , Disp: , Rfl:       Allergies   Allergen Reactions    Codeine Itching     Objective:  Visit Vitals    /60    Pulse 77    Temp 97.6 °F (36.4 °C)    Resp 14    Ht 5' 4\" (1.626 m)    Wt 125 lb (56.7 kg)    SpO2 98%    BMI 21.46 kg/m2      Constitutional: Well developed, nourished, no distress, alert, thin   HENT: Exterior ears and tympanic membranes normal bilaterally. Supple neck. No thyromegaly or lymphadenopathy. Oropharynx clear and moist mucous membranes. Eyes: Conjunctiva normal. PERRL. CV: S1, S2.  RRR. No murmurs/rubs. No thrills palpated. No carotid bruits. Intact distal pulses. No edema. Pulm: No abnormalities on inspection. Clear to auscultation bilaterally. No wheezing/rhonchi. Normal effort. GI: Soft, nontender, nondistended. Normal active bowel sounds. MS: Gait normal.  Joints without deformity/tenderness. Strength intact bilateral upper and lower ext. Normal ROM all extremities. Neuro: A/O x 3. No focal motor or sensory deficits. Speech normal.   Skin: No lesions/rashes on inspection. Psych: Appropriate affect, judgement and insight. Short-term memory intact.        Labwork and Ancillary Studies:    CBC w/Diff  Lab Results   Component Value Date/Time    WBC 8.2 12/02/2016 09:40 AM    HGB 12.8 12/02/2016 09:40 AM    PLATELET 218 47/95/4401 09:40 AM         Basic Metabolic Profile/LFTs  Lab Results   Component Value Date/Time    Sodium 144 12/02/2016 09:40 AM    Potassium 4.3 12/02/2016 09:40 AM    Chloride 109 12/02/2016 09:40 AM    CO2 29 12/02/2016 09:40 AM    Anion gap 6 12/02/2016 09:40 AM    Glucose 73 12/02/2016 09:40 AM    BUN 19 12/02/2016 09:40 AM    Creatinine 0.92 12/02/2016 09:40 AM    BUN/Creatinine ratio 21 12/02/2016 09:40 AM    GFR est AA >60 12/02/2016 09:40 AM    GFR est non-AA 59 12/02/2016 09:40 AM    Calcium 8.9 12/02/2016 09:40 AM      Lab Results   Component Value Date/Time    ALT (SGPT) 13 12/02/2016 09:40 AM    AST (SGOT) 17 12/02/2016 09:40 AM    Alk.  phosphatase 92 12/02/2016 09:40 AM    Bilirubin, total 0.4 12/02/2016 09:40 AM       Cholesterol  Lab Results   Component Value Date/Time    Cholesterol, total 216 12/02/2016 09:40 AM    HDL Cholesterol 68 12/02/2016 09:40 AM    LDL, calculated 134.8 12/02/2016 09:40 AM    Triglyceride 66 12/02/2016 09:40 AM    CHOL/HDL Ratio 3.2 12/02/2016 09:40 AM

## 2017-06-05 NOTE — MR AVS SNAPSHOT
Visit Information Date & Time Provider Department Dept. Phone Encounter #  
 6/5/2017 10:30 AM Renetta Singh MD [employer] [phone] [id number] Follow-up Instructions Return in about 2 months (around 8/5/2017). Follow-up and Disposition History Upcoming Health Maintenance Date Due ZOSTER VACCINE AGE 60> 7/23/1998 GLAUCOMA SCREENING Q2Y 7/23/2003 INFLUENZA AGE 9 TO ADULT 8/1/2017 Pneumococcal 65+ Low/Medium Risk (2 of 2 - PCV13) 11/14/2017 MEDICARE YEARLY EXAM 12/10/2017 DTaP/Tdap/Td series (2 - Td) 11/14/2026 Allergies as of 6/5/2017  Review Complete On: 6/5/2017 By: Renetta Singh MD  
  
 Severity Noted Reaction Type Reactions Codeine  10/20/2016    Itching Current Immunizations  Never Reviewed Name Date Pneumococcal Polysaccharide (PPSV-23) 11/14/2016 Not reviewed this visit You Were Diagnosed With   
  
 Codes Comments Subclinical hypothyroidism    -  Primary ICD-10-CM: E03.9 ICD-9-CM: 244.8 Vitamin D deficiency     ICD-10-CM: E55.9 ICD-9-CM: 268.9 Mixed incontinence urge and stress     ICD-10-CM: N39.46 
ICD-9-CM: 788.33 Vitals BP Pulse Temp Resp Height(growth percentile) Weight(growth percentile) 122/60 77 97.6 °F (36.4 °C) 14 5' 4\" (1.626 m) 125 lb (56.7 kg) SpO2 BMI OB Status Smoking Status 98% 21.46 kg/m2 Hysterectomy Former Smoker Vitals History BMI and BSA Data Body Mass Index Body Surface Area  
 21.46 kg/m 2 1.6 m 2 Preferred Pharmacy Pharmacy Name Phone [address] [facility], [address] [place], [place] [address]way AT [address] [phone] Your Updated Medication List  
  
   
This list is accurate as of: 6/5/17 10:55 AM.  Always use your most recent med list.  
  
  
  
  
 cholecalciferol (vitamin D3) 2,000 unit Tab Take  by mouth.  
  
 escitalopram oxalate 10 mg tablet Commonly known as:  Valdene Olivia Take 10 mg by mouth daily. MYRBETRIQ 25 mg ER tablet Generic drug:  mirabegron ER Take 25 mg by mouth daily. synthroid 50 mcg tablet Generic drug:  levothyroxine Take  by mouth Daily (before breakfast). Follow-up Instructions Return in about 2 months (around 8/5/2017). To-Do List   
 06/05/2017 Lab:  T3, FREE   
  
 06/05/2017 Lab:  T4, FREE   
  
 06/05/2017 Lab:  TSH 3RD GENERATION   
  
 06/05/2017 Lab:  VITAMIN D, 25 HYDROXY Patient Instructions Bladder Training: Care Instructions Your Care Instructions Bladder training is used to treat urge incontinence and stress incontinence. Urge incontinence means that the need to urinate comes on so fast that you can't get to a toilet in time. Stress incontinence means that you leak urine because of pressure on your bladder. For example, it may happen when you laugh, cough, or lift something heavy. Bladder training can increase how long you can wait before you have to urinate. It can also help your bladder hold more urine. And it can give you better control over the urge to urinate. It is important to remember that bladder training takes a few weeks to a few months to make a difference. You may not see results right away, but don't give up. Follow-up care is a key part of your treatment and safety. Be sure to make and go to all appointments, and call your doctor if you are having problems. It's also a good idea to know your test results and keep a list of the medicines you take. How can you care for yourself at home? Work with your doctor to come up with a bladder training program that is right for you. You may use one or more of the following methods. Delayed urination · In the beginning, try to keep from urinating for 5 minutes after you first feel the need to go. · While you wait, take deep, slow breaths to relax.  Kegel exercises can also help you delay the need to go to the bathroom. · After some practice, when you can easily wait 5 minutes to urinate, try to wait 10 minutes before you urinate. · Slowly increase the waiting period until you are able to control when you have to urinate. Scheduled urination · Empty your bladder when you first wake up in the morning. · Schedule times throughout the day when you will urinate. · Start by going to the bathroom every hour, even if you don't need to go. · Slowly increase the time between trips to the bathroom. · When you have found a schedule that works well for you, keep doing it. · If you wake up during the night and have to urinate, do it. Apply your schedule to waking hours only. Kegel exercises These tighten and strengthen pelvic muscles, which can help you control the flow of urine. To do Kegel exercises: 
· Squeeze the same muscles you would use to stop your urine. Your belly and thighs should not move. · Hold the squeeze for 3 seconds, and then relax for 3 seconds. · Start with 3 seconds. Then add 1 second each week until you are able to squeeze for 10 seconds. · Repeat the exercise 10 to 15 times a session. Do three or more sessions a day. When should you call for help? Watch closely for changes in your health, and be sure to contact your doctor if: 
· Your incontinence is getting worse. · You do not get better as expected. Where can you learn more? Go to http://estefanía-laila.info/. Enter A423 in the search box to learn more about \"Bladder Training: Care Instructions. \" Current as of: August 12, 2016 Content Version: 11.2 © 7181-7863 June Blackbox. Care instructions adapted under license by "[employer]" (which disclaims liability or warranty for this information).  If you have questions about a medical condition or this instruction, always ask your healthcare professional. K[employer] disclaims any warranty or liability for your use of this information. Kegel Exercises: Care Instructions Your Care Instructions Kegel exercises strengthen muscles around the bladder. These muscles control the flow of urine. Kegel exercises are sometime called \"pelvic floor\" exercises. They can help prevent urine leakage and keep the pelvic organs in place. A woman who just had a baby might want to try Kegel exercises. They can strengthen pelvic muscles that have been weakened by pregnancy and childbirth. A man or woman may use Kegel exercises to treat urine leakage. You do Kegel exercises by tightening the muscles you use when you urinate. You will likely need to do these exercises for several weeks to get better. Follow-up care is a key part of your treatment and safety. Be sure to make and go to all appointments, and call your doctor if you are having problems. It's also a good idea to know your test results and keep a list of the medicines you take. How can you care for yourself at home? · Do Kegel exercises. ¨ Find the muscles you need to strengthen. To do this, tighten the muscles that stop your urine while you are going to the bathroom. These are the same muscles you squeeze during Kegel exercises. ¨ Squeeze the muscles as hard as you can. Your belly and thighs should not move. ¨ Hold the squeeze for 3 seconds. Then relax for 3 seconds. ¨ Start with 3 seconds, and then add 1 second each week until you are able to squeeze for 10 seconds. ¨ Repeat the exercise 10 to 15 times for each session. Do three or more sessions each day. · You can check to see if you are using the right muscles. Place a finger in your vagina and squeeze around it. You are doing them right when you feel pressure around your finger. Your doctor may also suggest that you put special weights in your vagina while you do the exercises. · Do not smoke. It can irritate the bladder.  If you need help quitting, talk to your doctor about stop-smoking programs and medicines. These can increase your chances of quitting for good. Where can you learn more? Go to http://estefanía-laila.info/. Enter D670 in the search box to learn more about \"Kegel Exercises: Care Instructions. \" Current as of: August 12, 2016 Content Version: 11.2 © 7843-1867 Medical Joyworks. Care instructions adapted under license by [employer] (which disclaims liability or warranty for this information). If you have questions about a medical condition or this instruction, always ask your healthcare professional. [address] any warranty or liability for your use of this information. Introducing hospitals & HEALTH SERVICES! Margoth Mosqueda introduces [email or web address] patient portal. Now you can access parts of your medical record, email your doctor's office, and request medication refills online. 1. In your internet browser, go to [email or web address] 2. Click on the First Time User? Click Here link in the Sign In box. You will see the New Member Sign Up page. 3. Enter your [email or web address] Access Code exactly as it appears below. You will not need to use this code after youve completed the sign-up process. If you do not sign up before the expiration date, you must request a new code. · [email or web address] Access Code: [email or web address] Expires: 9/3/2017 10:55 AM 
 
4. Enter the last four digits of your Social Security Number (xxxx) and Date of Birth (mm/dd/yyyy) as indicated and click Submit. You will be taken to the next sign-up page. 5. Create a 8fit - Fitness for the rest of ust ID. This will be your [email or web address] login ID and cannot be changed, so think of one that is secure and easy to remember. 6. Create a Parclick.com password. You can change your password at any time. 7. Enter your Password Reset Question and Answer. This can be used at a later time if you forget your password. 8. Enter your e-mail address. You will receive e-mail notification when new information is available in [address] 9. Click Sign Up. You can now view and download portions of your medical record. 10. Click the Download Summary menu link to download a portable copy of your medical information. If you have questions, please visit the Frequently Asked Questions section of the Given Goods website. Remember, Given Goods is NOT to be used for urgent needs. For medical emergencies, dial 911. Now available from your iPhone and Android! Please provide this summary of care documentation to your next provider. Your primary care clinician is listed as Perla 13. If you have any questions after today's visit, please call [phone].

## 2017-07-14 ENCOUNTER — OFFICE VISIT (OUTPATIENT)
Dept: FAMILY MEDICINE CLINIC | Age: 77
End: 2017-07-14

## 2017-07-14 VITALS
DIASTOLIC BLOOD PRESSURE: 72 MMHG | SYSTOLIC BLOOD PRESSURE: 136 MMHG | WEIGHT: 129 LBS | TEMPERATURE: 98 F | HEART RATE: 72 BPM | HEIGHT: 64 IN | OXYGEN SATURATION: 97 % | BODY MASS INDEX: 22.02 KG/M2 | RESPIRATION RATE: 14 BRPM

## 2017-07-14 DIAGNOSIS — R05.3 CHRONIC COUGH: ICD-10-CM

## 2017-07-14 DIAGNOSIS — G89.29 CHRONIC PAIN OF RIGHT LOWER EXTREMITY: Primary | ICD-10-CM

## 2017-07-14 DIAGNOSIS — R60.0 LOCALIZED EDEMA: ICD-10-CM

## 2017-07-14 DIAGNOSIS — M79.604 CHRONIC PAIN OF RIGHT LOWER EXTREMITY: Primary | ICD-10-CM

## 2017-07-14 DIAGNOSIS — M79.2 NEUROPATHIC PAIN OF LOWER EXTREMITY, RIGHT: ICD-10-CM

## 2017-07-14 RX ORDER — LIDOCAINE 50 MG/G
PATCH TOPICAL
Qty: 30 EACH | Refills: 11 | Status: SHIPPED | OUTPATIENT
Start: 2017-07-14 | End: 2017-08-14 | Stop reason: ALTCHOICE

## 2017-07-14 RX ORDER — BENZONATATE 200 MG/1
200 CAPSULE ORAL
Qty: 90 CAP | Refills: 0 | Status: SHIPPED | OUTPATIENT
Start: 2017-07-14 | End: 2017-11-03 | Stop reason: ALTCHOICE

## 2017-07-14 RX ORDER — ESCITALOPRAM OXALATE 10 MG/1
10 TABLET ORAL DAILY
Qty: 90 TAB | Refills: 2 | Status: SHIPPED | OUTPATIENT
Start: 2017-07-14 | End: 2018-01-01 | Stop reason: SDUPTHER

## 2017-07-14 NOTE — MR AVS SNAPSHOT
Visit Information Date & Time Provider Department Dept. Phone Encounter #  
 7/14/2017 11:30 AM Mariya Suarez, [employer] [phone] [id number] Your Appointments 8/14/2017 10:30 AM  
Follow Up with Mariya Suarez MD  
[facility]) Appt Note: 2 month f/u appt [address] [place] [id number] [phone]  
  
   
 [address] Upcoming Health Maintenance Date Due ZOSTER VACCINE AGE 60> 7/23/1998 GLAUCOMA SCREENING Q2Y 7/23/2003 INFLUENZA AGE 9 TO ADULT 8/1/2017 Pneumococcal 65+ Low/Medium Risk (2 of 2 - PCV13) 11/14/2017 MEDICARE YEARLY EXAM 12/10/2017 DTaP/Tdap/Td series (2 - Td) 11/14/2026 Allergies as of 7/14/2017  Review Complete On: 7/14/2017 By: Sammi Nesbitt LPN Severity Noted Reaction Type Reactions Codeine  10/20/2016    Itching Current Immunizations  Never Reviewed Name Date Pneumococcal Polysaccharide (PPSV-23) 11/14/2016 Not reviewed this visit You Were Diagnosed With   
  
 Codes Comments Chronic pain of right lower extremity    -  Primary ICD-10-CM: M79.604, X11.73 ICD-9-CM: 729.5, 338.29 Neuropathic pain of lower extremity, right     ICD-10-CM: G57.91 
ICD-9-CM: 355. 8 Chronic cough     ICD-10-CM: R05 ICD-9-CM: 786.2 Localized edema     ICD-10-CM: R60.0 ICD-9-CM: 424. 3 Vitals BP Pulse Temp Resp Height(growth percentile) Weight(growth percentile) 136/72 72 98 °F (36.7 °C) 14 5' 4\" (1.626 m) 129 lb (58.5 kg) SpO2 BMI OB Status Smoking Status 97% 22.14 kg/m2 Hysterectomy Former Smoker Vitals History BMI and BSA Data Body Mass Index Body Surface Area  
 22.14 kg/m 2 1.63 m 2 Preferred Pharmacy Pharmacy Name Phone  [address] [facility]  AT [facility] [address] [facility] [phone] Your Updated Medication List  
  
   
This list is accurate as of: 7/14/17 12:18 PM.  Always use your most recent med list.  
  
  
  
  
 benzonatate 200 mg capsule Commonly known as:  TESSALON Take 1 Cap by mouth three (3) times daily as needed for Cough. cholecalciferol (vitamin D3) 2,000 unit Tab Take  by mouth.  
  
 escitalopram oxalate 10 mg tablet Commonly known as:  Napolean Mantle Take 1 Tab by mouth daily. At 7pm  
  
 lidocaine 5 % Commonly known as:  Ambrocio Ax Apply patch to the affected area for 12 hours a day and remove for 12 hours a day. Apply at 7am daily MYRBETRIQ 25 mg ER tablet Generic drug:  mirabegron ER Take 25 mg by mouth daily. Prescriptions Printed Refills  
 lidocaine (LIDODERM) 5 % 11 Sig: Apply patch to the affected area for 12 hours a day and remove for 12 hours a day. Apply at 7am daily Class: Print  
 escitalopram oxalate (LEXAPRO) 10 mg tablet 2 Sig: Take 1 Tab by mouth daily. At 7pm  
 Class: Print Route: Oral  
 benzonatate (TESSALON) 200 mg capsule 0 Sig: Take 1 Cap by mouth three (3) times daily as needed for Cough. Class: Print Route: Oral  
  
To-Do List   
 07/14/2017 Imaging:  XR CHEST PA LAT Patient Instructions Bladder Training: Care Instructions Your Care Instructions Bladder training is used to treat urge incontinence and stress incontinence. Urge incontinence means that the need to urinate comes on so fast that you can't get to a toilet in time. Stress incontinence means that you leak urine because of pressure on your bladder. For example, it may happen when you laugh, cough, or lift something heavy. Bladder training can increase how long you can wait before you have to urinate. It can also help your bladder hold more urine. And it can give you better control over the urge to urinate. It is important to remember that bladder training takes a few weeks to a few months to make a difference. You may not see results right away, but don't give up. Follow-up care is a key part of your treatment and safety. Be sure to make and go to all appointments, and call your doctor if you are having problems. It's also a good idea to know your test results and keep a list of the medicines you take. How can you care for yourself at home? Work with your doctor to come up with a bladder training program that is right for you. You may use one or more of the following methods. Delayed urination · In the beginning, try to keep from urinating for 5 minutes after you first feel the need to go. · While you wait, take deep, slow breaths to relax. Kegel exercises can also help you delay the need to go to the bathroom. · After some practice, when you can easily wait 5 minutes to urinate, try to wait 10 minutes before you urinate. · Slowly increase the waiting period until you are able to control when you have to urinate. Scheduled urination · Empty your bladder when you first wake up in the morning. · Schedule times throughout the day when you will urinate. · Start by going to the bathroom every hour, even if you don't need to go. · Slowly increase the time between trips to the bathroom. · When you have found a schedule that works well for you, keep doing it. · If you wake up during the night and have to urinate, do it. Apply your schedule to waking hours only. Kegel exercises These tighten and strengthen pelvic muscles, which can help you control the flow of urine. To do Kegel exercises: 
· Squeeze the same muscles you would use to stop your urine. Your belly and thighs should not move. · Hold the squeeze for 3 seconds, and then relax for 3 seconds. · Start with 3 seconds. Then add 1 second each week until you are able to squeeze for 10 seconds. · Repeat the exercise 10 to 15 times a session. Do three or more sessions a day. When should you call for help? Watch closely for changes in your health, and be sure to contact your doctor if: 
· Your incontinence is getting worse. · You do not get better as expected. Where can you learn more? Go to http://estefanía-laila.info/. Enter C409 in the search box to learn more about \"Bladder Training: Care Instructions. \" Current as of: August 12, 2016 Content Version: 11.3 © 8789-8917 HighFive Mobile. Care instructions adapted under license by [employer]ut (which disclaims liability or warranty for this information). If you have questions about a medical condition or this instruction, always ask your healthcare professional. [address] any warranty or liability for your use of this information. Introducing [facility] & HEALTH SERVICES! Tesha Ribera introduces obopay patient portal. Now you can access parts of your medical record, email your doctor's office, and request medication refills online. 1. In your internet browser, go to [email or web address] 2. Click on the First Time User? Click Here link in the Sign In box. You will see the New Member Sign Up page. 3. Enter your obopay Access Code exactly as it appears below. You will not need to use this code after youve completed the sign-up process. If you do not sign up before the expiration date, you must request a new code. · obopay Access Code: [email or web address] Expires: 9/3/2017 10:55 AM 
 
4. Enter the last four digits of your Social Security Number (xxxx) and Date of Birth (mm/dd/yyyy) as indicated and click Submit. You will be taken to the next sign-up page. 5. Create a ZettaCoret ID. This will be your obopay login ID and cannot be changed, so think of one that is secure and easy to remember. 6. Create a ZettaCoret password. You can change your password at any time. 7. Enter your Password Reset Question and Answer. This can be used at a later time if you forget your password. 8. Enter your e-mail address. You will receive e-mail notification when new information is available in [address] 9. Click Sign Up. You can now view and download portions of your medical record. 10. Click the Download Summary menu link to download a portable copy of your medical information. If you have questions, please visit the Frequently Asked Questions section of the Retsly website. Remember, Retsly is NOT to be used for urgent needs. For medical emergencies, dial 911. Now available from your iPhone and Android! Please provide this summary of care documentation to your next provider. Your primary care clinician is listed as Perla 13. If you have any questions after today's visit, please call [phone].

## 2017-07-14 NOTE — PROGRESS NOTES
Ed Wright is a 64 y.o. female here today with complaints of cough and right leg pain        1. Have you been to the ER, urgent care clinic since your last visit? Hospitalized since your last visit? No    2. Have you seen or consulted any other health care providers outside of the [address] [place] [address] since your last visit? Include any pap smears or colon screening.  No

## 2017-07-14 NOTE — PROGRESS NOTES
Assessment/Plan:    1. 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 Apply at 7am daily  Dispense: 30 Each; Refill: 11    3. Chronic cough  -ck xray. Denies reflux, allergies, post nasal drip. Could do trial of flonase.  - XR CHEST PA LAT; Future  - benzonatate (TESSALON) 200 mg capsule; Take 1 Cap by mouth three (3) times daily as needed for Cough. Dispense: 90 Cap; Refill: 0    4. Localized edema  -elevate foot for now. No sx of chf/dyspnea. The plan was discussed with the patient. The patient verbalized understanding and is in agreement with the plan. All medication potential side effects were discussed with the patient. Health Maintenance:   Health Maintenance   Topic Date Due    ZOSTER VACCINE AGE 60>  07/23/1998    GLAUCOMA SCREENING Q2Y  07/23/2003    INFLUENZA AGE 9 TO ADULT  08/01/2017    Pneumococcal 65+ Low/Medium Risk (2 of 2 - PCV13) 11/14/2017    MEDICARE YEARLY EXAM  12/10/2017    DTaP/Tdap/Td series (2 - Td) 11/14/2026    OSTEOPOROSIS SCREENING (DEXA)  Completed       Avtar Lubin is a 64 y.o. female and presents with Follow Up Chronic Condition     Subjective:  Pt c/o nonproductive cough, worse in the am x \"a long time\". Felt like she was choking. Denies reflux, PND or allergies. Pt c/o L foot swelling, intermittent. No dyspnea. Swelling localized to lateral aspect of L foot. Started after a fall. Notes chronic R hip pain after her femur fracture and repair. States when it's flared up the surgical incision looks \"angry\". She states it feels like a burning sensation. Pt has OAB and is on myrbetriq. Insurance isn't covering it at all. ROS:  Constitutional: No recent weight change. No weakness/fatigue. No f/c. Cardiovascular: No CP/palpitations. No ALY/orthopnea/PND. Respiratory: + cough/no sputum, no dyspnea, wheezing. Gastointestinal: No dysphagia, reflux. No n/v. No constipation/diarrhea. No melena/rectal bleeding. Genitourinary: No dysuria, urinary hesitancy, nocturia, hematuria.  + incontinence. Neurological: No seizures/numbness/weakness. + paresthesias. The problem list was updated as a part of today's visit. 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 A01    Frequent falls R29.6    Right hand weakness R29.898    Contracture, right hand M24.541    Vertebral compression fracture (HCC) M48.50XA    Pure hypercholesterolemia E78.00    Full code status Z78.9    Subclinical hypothyroidism E03.9    OAB (overactive bladder) N32.81    Dysthymia F34.1    Vitamin D deficiency E55.9    First degree AV block I44.0       The PSH, FH were reviewed. SH:  Social History   Substance Use Topics    Smoking status: Former Smoker    Smokeless tobacco: Never Used    Alcohol use 0.6 oz/week     1 Glasses of wine per week       Medications/Allergies:  Current Outpatient Prescriptions on File Prior to Visit   Medication Sig Dispense Refill    escitalopram oxalate (LEXAPRO) 10 mg tablet Take 10 mg by mouth daily.  cholecalciferol, vitamin D3, 2,000 unit tab Take  by mouth.  mirabegron ER (MYRBETRIQ) 25 mg ER tablet Take 25 mg by mouth daily.  levothyroxine (SYNTHROID) 50 mcg tablet Take  by mouth Daily (before breakfast). No current facility-administered medications on file prior to visit.          Allergies   Allergen Reactions    Codeine Itching       Objective:  Visit Vitals    /72    Pulse 72    Temp 98 °F (36.7 °C)    Resp 14    Ht 5' 4\" (1.626 m)    Wt 129 lb (58.5 kg)    SpO2 97%    BMI 22.14 kg/m2      Constitutional: Well developed, nourished, no distress, alert   HENT: Exterior ears and tympanic membranes normal bilaterally. Supple neck. No thyromegaly or lymphadenopathy. Oropharynx clear and moist mucous membranes. Eyes: Conjunctiva normal. PERRL. CV: S1, S2.  RRR. No murmurs/rubs. No thrills palpated. No carotid bruits. Intact distal pulses. +1 pitting edema L lateral foot. Pulm: No abnormalities on inspection. Clear to auscultation bilaterally. No wheezing/rhonchi. Normal effort. GI: Soft, nontender, nondistended. Normal active bowel sounds. MS: Gait normal, but slow with walker. Psych: Appropriate affect, judgement and insight. Short-term memory intact. Labwork and Ancillary Studies:    CBC w/Diff  Lab Results   Component Value Date/Time    WBC 8.2 12/02/2016 09:40 AM    HGB 12.8 12/02/2016 09:40 AM    PLATELET 007 98/03/5559 09:40 AM         Basic Metabolic Profile/LFTs  Lab Results   Component Value Date/Time    Sodium 144 12/02/2016 09:40 AM    Potassium 4.3 12/02/2016 09:40 AM    Chloride 109 12/02/2016 09:40 AM    CO2 29 12/02/2016 09:40 AM    Anion gap 6 12/02/2016 09:40 AM    Glucose 73 12/02/2016 09:40 AM    BUN 19 12/02/2016 09:40 AM    Creatinine 0.92 12/02/2016 09:40 AM    BUN/Creatinine ratio 21 12/02/2016 09:40 AM    GFR est AA >60 12/02/2016 09:40 AM    GFR est non-AA 59 12/02/2016 09:40 AM    Calcium 8.9 12/02/2016 09:40 AM      Lab Results   Component Value Date/Time    ALT (SGPT) 13 12/02/2016 09:40 AM    AST (SGOT) 17 12/02/2016 09:40 AM    Alk.  phosphatase 92 12/02/2016 09:40 AM    Bilirubin, total 0.4 12/02/2016 09:40 AM       Cholesterol  Lab Results   Component Value Date/Time    Cholesterol, total 216 12/02/2016 09:40 AM    HDL Cholesterol 68 12/02/2016 09:40 AM    LDL, calculated 134.8 12/02/2016 09:40 AM    Triglyceride 66 12/02/2016 09:40 AM    CHOL/HDL Ratio 3.2 12/02/2016 09:40 AM

## 2017-07-14 NOTE — PATIENT INSTRUCTIONS
Bladder Training: Care Instructions  Your Care Instructions  Bladder training is used to treat urge incontinence and stress incontinence. Urge incontinence means that the need to urinate comes on so fast that you can't get to a toilet in time. Stress incontinence means that you leak urine because of pressure on your bladder. For example, it may happen when you laugh, cough, or lift something heavy. Bladder training can increase how long you can wait before you have to urinate. It can also help your bladder hold more urine. And it can give you better control over the urge to urinate. It is important to remember that bladder training takes a few weeks to a few months to make a difference. You may not see results right away, but don't give up. Follow-up care is a key part of your treatment and safety. Be sure to make and go to all appointments, and call your doctor if you are having problems. It's also a good idea to know your test results and keep a list of the medicines you take. How can you care for yourself at home? Work with your doctor to come up with a bladder training program that is right for you. You may use one or more of the following methods. Delayed urination  · In the beginning, try to keep from urinating for 5 minutes after you first feel the need to go. · While you wait, take deep, slow breaths to relax. Kegel exercises can also help you delay the need to go to the bathroom. · After some practice, when you can easily wait 5 minutes to urinate, try to wait 10 minutes before you urinate. · Slowly increase the waiting period until you are able to control when you have to urinate. Scheduled urination  · Empty your bladder when you first wake up in the morning. · Schedule times throughout the day when you will urinate. · Start by going to the bathroom every hour, even if you don't need to go. · Slowly increase the time between trips to the bathroom.   · When you have found a schedule that works well for you, keep doing it. · If you wake up during the night and have to urinate, do it. Apply your schedule to waking hours only. Kegel exercises  These tighten and strengthen pelvic muscles, which can help you control the flow of urine. To do Kegel exercises:  · Squeeze the same muscles you would use to stop your urine. Your belly and thighs should not move. · Hold the squeeze for 3 seconds, and then relax for 3 seconds. · Start with 3 seconds. Then add 1 second each week until you are able to squeeze for 10 seconds. · Repeat the exercise 10 to 15 times a session. Do three or more sessions a day. When should you call for help? Watch closely for changes in your health, and be sure to contact your doctor if:  · Your incontinence is getting worse. · You do not get better as expected. Where can you learn more? Go to http://estefanía-laila.info/. Enter N246 in the search box to learn more about \"Bladder Training: Care Instructions. \"  Current as of: August 12, 2016  Content Version: 11.3  © 0068-9533 [employer]. Care instructions adapted under license by [employer] (which disclaims liability or warranty for this information). If you have questions about a medical condition or this instruction, always ask your healthcare professional. [address] any warranty or liability for your use of this information.

## 2017-07-19 ENCOUNTER — TELEPHONE (OUTPATIENT)
Dept: FAMILY MEDICINE CLINIC | Age: 77
End: 2017-07-19

## 2017-07-19 RX ORDER — DICLOFENAC SODIUM 10 MG/G
4 GEL TOPICAL 4 TIMES DAILY
Qty: 4 G | Status: CANCELLED | OUTPATIENT
Start: 2017-07-19

## 2017-07-19 NOTE — TELEPHONE ENCOUNTER
Daniel from [address] [facility] was told to ask Dr. Griselda Filter if patient can use icy hot instead of Voltaren since it works fine for her. Asking to be called back [phone].

## 2017-07-19 NOTE — TELEPHONE ENCOUNTER
Tried to call Catarino Figueroa back recording says the person you are trying reach is not able to accept your call.

## 2017-07-19 NOTE — TELEPHONE ENCOUNTER
Lidocaine is a non covered Rx for pt [facility]. They are requesting Voltaren Gel 1%.

## 2017-08-07 ENCOUNTER — HOSPITAL ENCOUNTER (OUTPATIENT)
Dept: LAB | Age: 77
Discharge: HOME | End: 2017-08-07
Payer: MEDICARE

## 2017-08-07 DIAGNOSIS — E55.9 VITAMIN D DEFICIENCY: ICD-10-CM

## 2017-08-07 DIAGNOSIS — E03.8 SUBCLINICAL HYPOTHYROIDISM: ICD-10-CM

## 2017-08-07 LAB
25(OH)D3 SERPL-MCNC: 62 NG/ML (ref 30–100)
T3FREE SERPL-MCNC: 2.7 PG/ML (ref 2.3–4.2)
T4 FREE SERPL-MCNC: 0.8 NG/DL (ref 0.7–1.5)
TSH SERPL DL<=0.05 MIU/L-ACNC: 9.46 UIU/ML (ref 0.36–3.74)

## 2017-08-07 PROCEDURE — 84481 FREE ASSAY (FT-3): CPT | Performed by: INTERNAL MEDICINE

## 2017-08-07 PROCEDURE — 82306 VITAMIN D 25 HYDROXY: CPT | Performed by: INTERNAL MEDICINE

## 2017-08-07 PROCEDURE — 84439 ASSAY OF FREE THYROXINE: CPT | Performed by: INTERNAL MEDICINE

## 2017-08-07 PROCEDURE — 36415 COLL VENOUS BLD VENIPUNCTURE: CPT | Performed by: INTERNAL MEDICINE

## 2017-08-07 PROCEDURE — 84443 ASSAY THYROID STIM HORMONE: CPT | Performed by: INTERNAL MEDICINE

## 2017-08-14 ENCOUNTER — OFFICE VISIT (OUTPATIENT)
Dept: FAMILY MEDICINE CLINIC | Age: 77
End: 2017-08-14

## 2017-08-14 VITALS
DIASTOLIC BLOOD PRESSURE: 80 MMHG | BODY MASS INDEX: 22.16 KG/M2 | SYSTOLIC BLOOD PRESSURE: 130 MMHG | WEIGHT: 129.8 LBS | TEMPERATURE: 98 F | HEIGHT: 64 IN | OXYGEN SATURATION: 95 % | RESPIRATION RATE: 17 BRPM | HEART RATE: 68 BPM

## 2017-08-14 DIAGNOSIS — R05.3 CHRONIC COUGH: ICD-10-CM

## 2017-08-14 DIAGNOSIS — I61.9 HEMORRHAGIC CEREBROVASCULAR ACCIDENT (CVA) (HCC): ICD-10-CM

## 2017-08-14 DIAGNOSIS — E03.8 SUBCLINICAL HYPOTHYROIDISM: Primary | ICD-10-CM

## 2017-08-14 NOTE — PROGRESS NOTES
Chief Complaint   Patient presents with    Pain (Chronic)    Labs     Consult     1. Have you been to the ER, urgent care clinic since your last visit? Hospitalized since your last visit? No    2. Have you seen or consulted any other health care providers outside of the [address] [place] [address] since your last visit? Include any pap smears or colon screening.  No'

## 2017-08-14 NOTE — PROGRESS NOTES
Assessment/Plan:    1. Subclinical hypothyroidism  -will not treat based on pt preference and lack of sx    2. Hemorrhagic cerebrovascular accident (CVA) ([facility] while on anticoagulation  -stable  -anticoag contraindicated    The plan was discussed with the patient. The patient verbalized understanding and is in agreement with the plan. All medication potential side effects were discussed with the patient. Health Maintenance:   Health Maintenance   Topic Date Due    ZOSTER VACCINE AGE 60>  05/23/1998    GLAUCOMA SCREENING Q2Y  07/23/2003    INFLUENZA AGE 9 TO ADULT  08/01/2017    Pneumococcal 65+ Low/Medium Risk (2 of 2 - PCV13) 11/14/2017    MEDICARE YEARLY EXAM  12/10/2017    DTaP/Tdap/Td series (2 - Td) 11/14/2026    OSTEOPOROSIS SCREENING (DEXA)  Completed       Cristóbal Green is a 76 y.o. female and presents with Pain (Chronic) and Labs (Consult)     Subjective:  Subclinical hypothyroid - pt continues to decline rx. Chronic cough - xray was normal.  Worse in am.  Tessalon is providing relief. Has neuropathic pain in R leg from previous femur fracture/repair. Insurance wouldn't cover lidoderm patches. States it's a burning sensation over the incision site. She is using icy hot. Her daughter reports episodes when her mood isn't \"good\" - usually on cloudy days. Her daughter reports difficulty finding words, and her memory isn't as sharp as it usually is. The daughter states it happens when she is tired. It usually happens once per month. ROS:  Constitutional: No recent weight change. No weakness/fatigue. No f/c. Cardiovascular: No CP/palpitations. No ALY/orthopnea/PND. Respiratory: No cough/sputum, dyspnea, wheezing. Gastointestinal: No dysphagia, reflux. No n/v. No constipation/diarrhea. No melena/rectal bleeding. Genitourinary: No dysuria, urinary hesitancy, nocturia, hematuria. No incontinence. Musculoskeletal: No joint pain/stiffness.   No muscle pain/tenderness. Neurological: No seizures/numbness/weakness. No paresthesias. Psychiatric:  No depression, anxiety. The problem list was updated as a part of today's visit. 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 A01    Frequent falls R29.6    Right hand weakness R29.898    Contracture, right hand M24.541    Vertebral compression fracture (HCC) M48.50XA    Pure hypercholesterolemia E78.00    Full code status Z78.9    Subclinical hypothyroidism E03.9    OAB (overactive bladder) N32.81    Dysthymia F34.1    Vitamin D deficiency E55.9    First degree AV block I44.0       The PSH, FH were reviewed. SH:  Social History   Substance Use Topics    Smoking status: Former Smoker    Smokeless tobacco: Never Used    Alcohol use 0.6 oz/week     1 Glasses of wine per week       Medications/Allergies:  Current Outpatient Prescriptions on File Prior to Visit   Medication Sig Dispense Refill    escitalopram oxalate (LEXAPRO) 10 mg tablet Take 1 Tab by mouth daily. At 7pm 90 Tab 2    benzonatate (TESSALON) 200 mg capsule Take 1 Cap by mouth three (3) times daily as needed for Cough. 90 Cap 0    cholecalciferol, vitamin D3, 2,000 unit tab Take  by mouth.  mirabegron ER (MYRBETRIQ) 25 mg ER tablet Take 25 mg by mouth daily. No current facility-administered medications on file prior to visit.          Allergies   Allergen Reactions    Codeine Itching       Objective:  Visit Vitals    /80 (BP 1 Location: Left arm, BP Patient Position: Sitting)    Pulse 68    Temp 98 °F (36.7 °C) (Oral)    Resp 17    Ht 5' 4\" (1.626 m)    Wt 129 lb 12.8 oz (58.9 kg)    SpO2 95%    BMI 22.28 kg/m2 Constitutional: Well developed, nourished, no distress, alert   CV: S1, S2.  RRR. No murmurs/rubs. No thrills palpated. No carotid bruits. Intact distal pulses. No edema. Pulm: No abnormalities on inspection. Clear to auscultation bilaterally. No wheezing/rhonchi. Normal effort. Neuro: Speech dysarthric. Ambulates with walker   Psych: Appropriate affect, judgement and insight. Short-term memory intact.

## 2017-08-14 NOTE — MR AVS SNAPSHOT
Visit Information Date & Time Provider Department Dept. Phone Encounter #  
 8/14/2017 10:30 AM Jagdish Dang MD [address] [phone] Upcoming Health Maintenance Date Due ZOSTER VACCINE AGE 60> 5/23/1998 GLAUCOMA SCREENING Q2Y 7/23/2003 INFLUENZA AGE 9 TO ADULT 8/1/2017 Pneumococcal 65+ Low/Medium Risk (2 of 2 - PCV13) 11/14/2017 MEDICARE YEARLY EXAM 12/10/2017 DTaP/Tdap/Td series (2 - Td) 11/14/2026 Allergies as of 8/14/2017  Review Complete On: 8/14/2017 By: Broderick [place] Severity Noted Reaction Type Reactions Codeine  10/20/2016    Itching Current Immunizations  Never Reviewed Name Date Pneumococcal Polysaccharide (PPSV-23) 11/14/2016 Not reviewed this visit You Were Diagnosed With   
  
 Codes Comments Subclinical hypothyroidism    -  Primary ICD-10-CM: E03.9 ICD-9-CM: 244.8 [facility]     ICD-10-CM: I61.9 ICD-9-CM: 553 Chronic cough     ICD-10-CM: R05 ICD-9-CM: 498. 2 Vitals BP Pulse Temp Resp Height(growth percentile) Weight(growth percentile) 130/80 (BP 1 Location: Left arm, BP Patient Position: Sitting) 68 98 °F (36.7 °C) (Oral) 17 5' 4\" (1.626 m) 129 lb 12.8 oz (58.9 kg) SpO2 BMI OB Status Smoking Status 95% 22.28 kg/m2 Hysterectomy Former Smoker Vitals History BMI and BSA Data Body Mass Index Body Surface Area  
 22.28 kg/m 2 1.63 m 2 Preferred Pharmacy Pharmacy Name Phone [facility], [address] [phone] Your Updated Medication List  
  
   
This list is accurate as of: 8/14/17 10:57 AM.  Always use your most recent med list.  
  
  
  
  
 benzonatate 200 mg capsule Commonly known as:  TESSALON Take 1 Cap by mouth three (3) times daily as needed for Cough. cholecalciferol (vitamin D3) 2,000 unit Tab Take  by mouth. escitalopram oxalate 10 mg tablet Commonly known as:  Say Orts Take 1 Tab by mouth daily. At 7pm  
  
 MYRBETRIQ 25 mg ER tablet Generic drug:  mirabegron ER Take 25 mg by mouth daily. Introducing [facility]! Suzette Tang introduces [employer] patient portal. Now you can access parts of your medical record, email your doctor's office, and request medication refills online. 1. In your internet browser, go to [email or web address] 2. Click on the First Time User? Click Here link in the Sign In box. You will see the New Member Sign Up page. 3. Enter your [employer] Access Code exactly as it appears below. You will not need to use this code after youve completed the sign-up process. If you do not sign up before the expiration date, you must request a new code. · [employer] Access Code: [email or web address] Expires: 9/3/2017 10:55 AM 
 
4. Enter the last four digits of your Social Security Number (xxxx) and Date of Birth (mm/dd/yyyy) as indicated and click Submit. You will be taken to the next sign-up page. 5. Create a [employer] ID. This will be your [employer] login ID and cannot be changed, so think of one that is secure and easy to remember. 6. Create a [employer] password. You can change your password at any time. 7. Enter your Password Reset Question and Answer. This can be used at a later time if you forget your password. 8. Enter your e-mail address. You will receive e-mail notification when new information is available in [address] 9. Click Sign Up. You can now view and download portions of your medical record. 10. Click the Download Summary menu link to download a portable copy of your medical information. If you have questions, please visit the Frequently Asked Questions section of the [employer] website. Remember, [employer] is NOT to be used for urgent needs. For medical emergencies, dial 911. Now available from your iPhone and Android! Please provide this summary of care documentation to your next provider. Your primary care clinician is listed as Perla Basurto. If you have any questions after today's visit, please call [phone].

## 2017-08-21 ENCOUNTER — TELEPHONE (OUTPATIENT)
Dept: FAMILY MEDICINE CLINIC | Age: 77
End: 2017-08-21

## 2017-08-21 NOTE — TELEPHONE ENCOUNTER
Ana from [address] [place] [address] called to request copies of the last 3 OV notes to be faxed over    Fax: [phone]  Phone: [phone]

## 2017-08-28 ENCOUNTER — OFFICE VISIT (OUTPATIENT)
Dept: FAMILY MEDICINE CLINIC | Age: 77
End: 2017-08-28

## 2017-08-28 VITALS
BODY MASS INDEX: 22.09 KG/M2 | RESPIRATION RATE: 14 BRPM | OXYGEN SATURATION: 96 % | DIASTOLIC BLOOD PRESSURE: 80 MMHG | HEIGHT: 64 IN | TEMPERATURE: 98.4 F | WEIGHT: 129.4 LBS | SYSTOLIC BLOOD PRESSURE: 132 MMHG | HEART RATE: 66 BPM

## 2017-08-28 DIAGNOSIS — R13.10 SWALLOWING IMPAIRMENT: Primary | ICD-10-CM

## 2017-08-28 DIAGNOSIS — Z86.73 HISTORY OF STROKE: ICD-10-CM

## 2017-08-28 NOTE — PROGRESS NOTES
James Pza is a 76 y.o. female here for choking problems      1. Have you been to the ER, urgent care clinic or hospitalized since your last visit? NO.     2. Have you seen or consulted any other health care providers outside of the [facility]"" since your last visit (Include any pap smears or colon screening)? NO      Do you have an Advanced Directive? NO    Would you like information on Advanced Directives?  NO

## 2017-08-28 NOTE — PROGRESS NOTES
HISTORY OF PRESENT ILLNESS  Jennifer Roper is a 76 y.o. female. Choking   The history is provided by the patient and medical records. This is a recurrent problem. Episode onset: started a while ago but worse over the past week. Associated symptoms include shortness of breath. Pertinent negatives include no chest pain and no abdominal pain. 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 A01    Frequent falls R29.6    Right hand weakness R29.898    Contracture, right hand M24.541    Vertebral compression fracture (HCC) M48.50XA    Pure hypercholesterolemia E78.00    Full code status Z78.9    Subclinical hypothyroidism E03.9    OAB (overactive bladder) N32.81    Dysthymia F34.1    Vitamin D deficiency E55.9    First degree AV block I44.0    Chronic cough R05       Current Outpatient Prescriptions:     escitalopram oxalate (LEXAPRO) 10 mg tablet, Take 1 Tab by mouth daily. At 7pm, Disp: 90 Tab, Rfl: 2    benzonatate (TESSALON) 200 mg capsule, Take 1 Cap by mouth three (3) times daily as needed for Cough. , Disp: 90 Cap, Rfl: 0    cholecalciferol, vitamin D3, 2,000 unit tab, Take  by mouth., Disp: , Rfl:     mirabegron ER (MYRBETRIQ) 25 mg ER tablet, Take 25 mg by mouth daily. , Disp: , Rfl:       Review of Systems   Constitutional: Negative for fever and weight loss. HENT:        Reports episodic choking for some time, much worse over the past week or so, acknowledges difficulty with thin liquids like water and things like kernels of corn. Respiratory: Positive for choking and shortness of breath. Cardiovascular: Negative for chest pain and palpitations.    Gastrointestinal: Negative for abdominal pain, heartburn, nausea and vomiting. Neurological: Positive for speech change (l dysarthria following stroke, much improved). Psychiatric/Behavioral: Positive for memory loss. Visit Vitals    /80 (BP 1 Location: Left arm, BP Patient Position: Sitting)    Pulse 66    Temp 98.4 °F (36.9 °C) (Oral)    Resp 14    Ht 5' 4\" (1.626 m)    Wt 129 lb 6.4 oz (58.7 kg)    SpO2 96%    BMI 22.21 kg/m2       Physical Exam   Constitutional: She is oriented to person, place, and time. She appears well-developed and well-nourished. Weight stable   HENT:   Head: Normocephalic. Right Ear: Tympanic membrane and ear canal normal.   Left Ear: Tympanic membrane and ear canal normal.   Mouth/Throat: Oropharynx is clear and moist.   Eyes: Conjunctivae and EOM are normal.   Neck: Neck supple. Cardiovascular: Normal rate, regular rhythm and normal heart sounds. Pulmonary/Chest: Effort normal and breath sounds normal.   Lymphadenopathy:     She has no cervical adenopathy. Neurological: She is alert and oriented to person, place, and time. Skin: Skin is warm and dry. Psychiatric: She has a normal mood and affect. Her behavior is normal.   Nursing note and vitals reviewed. ASSESSMENT and PLAN    ICD-10-CM ICD-9-CM    1. Swallowing impairment R13.10 787.20 XR SWALLOW [facility] VIDEO   2.  History of stroke Z86.73 V12.54    Soft diet and thick liquids pending swallowing study  Report new or worsening symptoms

## 2017-08-28 NOTE — MR AVS SNAPSHOT
Visit Information Date & Time Provider Department Dept. Phone Encounter #  
 8/28/2017  1:30 PM De Rankin, 3 [facility] [phone] [id number] Your Appointments 11/13/2017 11:00 AM  
Follow Up with Eileen Bradford MD  
3 [facility]) Appt Note: 3 month f/u  
 [address] [facility] [address] [facility] [address] [phone]  
  
   
 [address] [facility] [address] [facility] Upcoming Health Maintenance Date Due ZOSTER VACCINE AGE 60> 5/23/1998 GLAUCOMA SCREENING Q2Y 7/23/2003 INFLUENZA AGE 9 TO ADULT 8/1/2017 Pneumococcal 65+ Low/Medium Risk (2 of 2 - PCV13) 11/14/2017 MEDICARE YEARLY EXAM 12/10/2017 DTaP/Tdap/Td series (2 - Td) 11/14/2026 Allergies as of 8/28/2017  Review Complete On: 8/28/2017 By: De Rankin MD  
  
 Severity Noted Reaction Type Reactions Codeine  10/20/2016    Itching Current Immunizations  Never Reviewed Name Date Pneumococcal Polysaccharide (PPSV-23) 11/14/2016 Not reviewed this visit You Were Diagnosed With   
  
 Codes Comments Swallowing impairment    -  Primary ICD-10-CM: R13.10 ICD-9-CM: 787.20 History of stroke     ICD-10-CM: Z86.73 
ICD-9-CM: V12.54 Vitals BP Pulse Temp Resp Height(growth percentile) Weight(growth percentile) 132/80 (BP 1 Location: Left arm, BP Patient Position: Sitting) 66 98.4 °F (36.9 °C) (Oral) 14 5' 4\" (1.626 m) 129 lb 6.4 oz (58.7 kg) SpO2 BMI OB Status Smoking Status 96% 22.21 kg/m2 Hysterectomy Former Smoker BMI and BSA Data Body Mass Index Body Surface Area  
 22.21 kg/m 2 1.63 m 2 Preferred Pharmacy Pharmacy Name Phone [facility], [address] [phone] Your Updated Medication List  
  
   
This list is accurate as of: 8/28/17  2:00 PM.  Always use your most recent med list.  
  
  
  
  
 benzonatate 200 mg capsule Commonly known as:  TESSALON Take 1 Cap by mouth three (3) times daily as needed for Cough. cholecalciferol (vitamin D3) 2,000 unit Tab Take  by mouth.  
  
 escitalopram oxalate 10 mg tablet Commonly known as:  Edil Rash Take 1 Tab by mouth daily. At 7pm  
  
 MYRBETRIQ 25 mg ER tablet Generic drug:  mirabegron ER Take 25 mg by mouth daily. To-Do List   
 08/28/2017 Imaging:  XR SWALLOW FUNC VIDEO Patient Instructions Soft diet and thick liquids pending swallowing study Report new or worsening symptoms Introducing [facility]! David Aleksandra introduces [employer] patient portal. Now you can access parts of your medical record, email your doctor's office, and request medication refills online. 1. In your internet browser, go to [email or web address] 2. Click on the First Time User? Click Here link in the Sign In box. You will see the New Member Sign Up page. 3. Enter your [employer] Access Code exactly as it appears below. You will not need to use this code after youve completed the sign-up process. If you do not sign up before the expiration date, you must request a new code. · [employer] Access Code: [email or web address] Expires: 9/3/2017 10:55 AM 
 
4. Enter the last four digits of your Social Security Number (xxxx) and Date of Birth (mm/dd/yyyy) as indicated and click Submit. You will be taken to the next sign-up page. 5. Create a [employer] ID. This will be your [employer] login ID and cannot be changed, so think of one that is secure and easy to remember. 6. Create a [employer] password. You can change your password at any time. 7. Enter your Password Reset Question and Answer. This can be used at a later time if you forget your password. 8. Enter your e-mail address. You will receive e-mail notification when new information is available in [address] 9. Click Sign Up. You can now view and download portions of your medical record. 10. Click the Download Summary menu link to download a portable copy of your medical information. If you have questions, please visit the Frequently Asked Questions section of the [employer] website. Remember, [employer] is NOT to be used for urgent needs. For medical emergencies, dial 911. Now available from your iPhone and Android! Please provide this summary of care documentation to your next provider. Your primary care clinician is listed as Perla 13. If you have any questions after today's visit, please call [phone].

## 2017-08-29 ENCOUNTER — TELEPHONE (OUTPATIENT)
Dept: FAMILY MEDICINE CLINIC | Age: 77
End: 2017-08-29

## 2017-08-29 NOTE — TELEPHONE ENCOUNTER
Nely RN with [facility] (her current home health provider) called to let us know that they offer speech therapy if we want to order it after her XR Swallow test. We can fax orders to [phone].

## 2017-08-29 NOTE — TELEPHONE ENCOUNTER
Pt daughter called and stated that the pt was unable to swallow the honey thickened diet and would like to cancel the order for the test. Pt's daughter stated the patient would like the order to be changed back to the previous regular diet and this order would have to be submitted to the assisted living facility were pt resides. #[phone].

## 2017-08-29 NOTE — TELEPHONE ENCOUNTER
Pt is refusing to eat or drink. Elver Gill from [facility] has stated the pt's daughter told her the pt still wants the test.   Now the pt's sister is calling [facility] with concerns. The sister is the POA and should be the only one speaking on pt's behalf. Elver Gill would like to know going forward does the pt need to go back on regular diet? Pt's daughter is on the Permission to Release we have on file from 11/2016.

## 2017-08-29 NOTE — TELEPHONE ENCOUNTER
Pt's form for honey thickened diet order was signed by Dr. Marga Meier and faxed back to [phone].  Confirmation received

## 2017-08-29 NOTE — TELEPHONE ENCOUNTER
Pt's daughter called on behalf of the pt, she was seen yesterday and the doctor put her on an all soft diet and the pt does not want to go on that diet anymore. I spoke to Kiki and she informed me that unfortunately until she gets the xr swallow function test she will have to remain on that diet so as not to mess with the results. Pt's daughter informed and will speak to the pt.

## 2017-08-30 NOTE — TELEPHONE ENCOUNTER
Spoke with Crow Walker made aware of Dr. Trace Patton advice, she stated she understood and she will be faxing over a form for Dr. Barbara Morel to sign.   Awaiting fax

## 2017-08-30 NOTE — TELEPHONE ENCOUNTER
Spoke with rep she stated Ross Stamp was not available at the time. She will leave her a message and have her call me back.

## 2017-08-30 NOTE — TELEPHONE ENCOUNTER
The risk from not eating or drinking fluids outweighs the risk of aspiration. I would recommend resuming the regular diet but still having the video swallowing test to see how much risk of aspiration, if any, is actually present. Family is also encouraged to follow up with Ms. Mckenzie Desouza PCP, Dr. Basim Garcia, for her opinion.

## 2017-08-30 NOTE — TELEPHONE ENCOUNTER
Spoke with Norma Barrera made aware of Dr. Dorothy Bo advice, she stated she understood and she will be faxing over a form for Dr. Gwendloyn Epley to sign.   Awaiting fax

## 2017-08-31 NOTE — TELEPHONE ENCOUNTER
Received fax, order signed by Dr. Jackie Emanuel for regular diet and faxed back to [phone].  Confirmation received

## 2017-08-31 NOTE — TELEPHONE ENCOUNTER
Received fax, order signed by Dr. Tiffany Ramsey for regular diet and faxed back to [phone].  Confirmation received

## 2017-09-08 ENCOUNTER — HOSPITAL ENCOUNTER (OUTPATIENT)
Dept: GENERAL RADIOLOGY | Age: 77
Discharge: HOME | End: 2017-09-08
Attending: FAMILY MEDICINE
Payer: MEDICARE

## 2017-09-08 ENCOUNTER — HOSPITAL ENCOUNTER (OUTPATIENT)
Dept: PHYSICAL THERAPY | Age: 77
Discharge: HOME | End: 2017-09-08
Attending: FAMILY MEDICINE
Payer: MEDICARE

## 2017-09-08 DIAGNOSIS — R13.10 SWALLOWING IMPAIRMENT: ICD-10-CM

## 2017-09-08 PROCEDURE — G8998 SWALLOW D/C STATUS: HCPCS

## 2017-09-08 PROCEDURE — G8997 SWALLOW GOAL STATUS: HCPCS

## 2017-09-08 PROCEDURE — 92611 MOTION FLUOROSCOPY/SWALLOW: CPT

## 2017-09-08 PROCEDURE — G8996 SWALLOW CURRENT STATUS: HCPCS

## 2017-09-08 PROCEDURE — 74230 X-RAY XM SWLNG FUNCJ C+: CPT

## 2017-09-08 PROCEDURE — 74011000255 HC RX REV CODE- 255: Performed by: FAMILY MEDICINE

## 2017-09-08 RX ADMIN — BARIUM SULFATE 15 ML: 400 PASTE ORAL at 12:15

## 2017-09-08 RX ADMIN — BARIUM SULFATE 700 MG: 700 TABLET ORAL at 12:16

## 2017-09-08 RX ADMIN — BARIUM SULFATE 30 ML: 0.81 POWDER, FOR SUSPENSION ORAL at 12:14

## 2017-09-08 NOTE — PROGRESS NOTES
Please advise patient/family that video swallowing evaluation did not show aspiration so there is no need to change her diet. Follow up with Dr. Zoraida Flores for persistent problems.

## 2017-09-08 NOTE — PROGRESS NOTES
[facility] at [address]  Ph: [phone] Fax: [phone]       Outpatient Modified Barium Swallow Evaluation      Maryam Shows        Date: 2017   : 1940      HISTORY:   Patient is a pleasant 64year old woman who was referred for MBS s/p CVA 2016, Craniotomy (May 2016), and repeat CVA  9-10 day later in May 2016. Patient's dtr reports increased challenges with eating. Radiologist: Jonn Tay Procedures  [x] Lateral View   [] A-P View [] Scanned to level of Sternum    [] Seated at 90 deg.   [] Other:    Presentation:   [x] Spoon   [] Cup   [x] Straw   [] Syringe   [x] Consecutive Swallows  [] Other:    Consistencies:   [x] Ba+ liquid   [] Ba+ liquid (nectar)   [] Ba+ liquid (honey)     [x] Ba+ pudding   [] Ba+ crunched cookie   [x] Ba+ cookie   [x] Other: 13 mm Ba+ tablet with thin liquid wash    Testing Discontinued: [] Due to:    Treatment Techniques Attempted     [] Head Turn: [] Right [] Left     [] Head Tilt: [] Right [] Left     [] Chin Down:  [] Thermal Sensitization:  [] Supraglottic Swallow:  [] Mendelson's Maneuver:  [] Other:    Results  Dysphagia Present:    [x] Yes  [] No    Ratings of Dysphagia:    [x] Minimal  [] Moderate [] Severe    Stages of Breakdown:   [x] Oral      [] Oral Preparatory [x] Pharyngeal   [] Esophageal    Aspiration: [] Yes    [x] No  [] At Risk  [] Trace (<10%) [] Significant (>10%):     %  [x] Penetration: Thin; Trace/Flash   Cough: [] Yes      [x] No    Consistency Aspirated:  [] Thin Liquid   [] Nectar   [] Honey   [] Pureed     [] Mech-soft  [] Solid    Motility Problems with:  [] Lip Closure:   [] Sucking:   [] Mastication:   [] Bolus Formation:   [x] Bolus Control: premature spillage with solid  [] A-P Transport:  [] Posterior Tongue Elevation:  [x] Swallow Response (delayed): solid  [] Velopharyngeal Closure:  [x] Laryngeal Elevation: 13 mm Ba+ tablet with thin liquid wash  [] Laryngeal Adduction:  [] Cricopharyngeal Relaxation:  [] Esophageal Peristalsis:  [] Reflux:  [] Other:    Timing of Aspiration:  [] Before Swallow:  [] During Swallow:  [] After Swallow:    Transit Time Delay:  [] >1 Second  Oral  [] >1 Second Pharyngeal  [] >20 Second Esophageal     Residuals:  [] Buccal Cavity   [] Velum/posterior pharyngeal wall  [] Valleculae  [] Pyriforms    Pain:  Pain start of study:0  Pain end of study:0    GCODESwallowing:  Swallow Current Status CI= 1-19%   Swallow Goal Status CI= 1-19%   Swallow D/C Status CI= 1-19%    The severity rating is based on the following outcomes:    CHRISTIN Noms - Swallow   8-point Penetration-Aspiration Scale Score:2  Clinical Judgement    RESULTS/RECOMMENDATIONS  MBS complete with trace/flash penetration of thin liquids when utilized as a wash for 13 mm Ba+ tablet. Patient with functional oral and pharyngeal phase with thin and pudding trials. Cracker trial with mild premature spillage and mild swallow delay, however this does not affect patient's swallow integrity. Pt presents with minimal oropharyngeal dysphagia, as evidenced above, which places pt at low risk for aspiration. At this time, safest for regular solid, thin liquid diet. SLP utilized video of study for visual feedback, education, and recommendations for pt/caregiver; verbalized comprehension.        Thank you for this referral,  Kaylene Landeros M.S. [facility] SLP  Ph: [phone]  Pager: [phone]

## 2017-09-18 ENCOUNTER — OFFICE VISIT (OUTPATIENT)
Dept: FAMILY MEDICINE CLINIC | Age: 77
End: 2017-09-18

## 2017-09-18 VITALS
WEIGHT: 131.4 LBS | DIASTOLIC BLOOD PRESSURE: 66 MMHG | HEART RATE: 84 BPM | BODY MASS INDEX: 22.43 KG/M2 | TEMPERATURE: 97.8 F | HEIGHT: 64 IN | OXYGEN SATURATION: 98 % | RESPIRATION RATE: 16 BRPM | SYSTOLIC BLOOD PRESSURE: 112 MMHG

## 2017-09-18 DIAGNOSIS — I69.322 CVA, OLD, DYSARTHRIA: ICD-10-CM

## 2017-09-18 DIAGNOSIS — Z87.81 HISTORY OF VERTEBRAL COMPRESSION FRACTURE: ICD-10-CM

## 2017-09-18 DIAGNOSIS — J43.2 CENTRILOBULAR EMPHYSEMA (HCC): Primary | ICD-10-CM

## 2017-09-18 DIAGNOSIS — I69.359 HISTORY OF HEMORRHAGIC STROKE WITH RESIDUAL HEMIPLEGIA (HCC): ICD-10-CM

## 2017-09-18 DIAGNOSIS — R05.3 CHRONIC COUGH: ICD-10-CM

## 2017-09-18 DIAGNOSIS — Z23 ENCOUNTER FOR IMMUNIZATION: ICD-10-CM

## 2017-09-18 RX ORDER — ALBUTEROL SULFATE 90 UG/1
2 AEROSOL, METERED RESPIRATORY (INHALATION)
Qty: 1 INHALER | Refills: 1 | Status: SHIPPED | OUTPATIENT
Start: 2017-09-18 | End: 2018-01-01

## 2017-09-18 NOTE — PROGRESS NOTES
Maryam Jones is a 76 y.o. female  Chief Complaint   Patient presents with    Shortness of Breath     non productive cough     1. Have you been to the ER, urgent care clinic or hospitalized since your last visit? NO.     2. Have you seen or consulted any other health care providers outside of the [address] [place] [address] since your last visit (Include any pap smears or colon screening)? NO      Do you have an Advanced Directive? NO    Would you like information on Advanced Directives?  NO

## 2017-09-18 NOTE — PROGRESS NOTES
Assessment/Plan:    1. Centrilobular emphysema ([address]  -declined pfts. Monitor x 1 mo. If not improved, will do echo/PFTs.  - umeclidinium (INCRUSE ELLIPTA) 62.5 mcg/actuation inhaler; Take 1 Puff by inhalation daily. Dispense: 30 Inhaler; Refill: 0  - albuterol (PROVENTIL HFA, VENTOLIN HFA, PROAIR HFA) 90 mcg/actuation inhaler; Take 2 Puffs by inhalation every four (4) hours as needed for Shortness of Breath (cough). Dispense: 1 Inhaler; Refill: 1    2. Chronic cough  - secondary to #1    3. History of vertebral compression fracture  -resolved    4. History of hemorrhagic stroke with residual hemiplegia (HCC)  -anticoag contraindicated    The plan was discussed with the patient. The patient verbalized understanding and is in agreement with the plan. All medication potential side effects were discussed with the patient. Health Maintenance:   Health Maintenance   Topic Date Due    ZOSTER VACCINE AGE 60>  05/23/1998    GLAUCOMA SCREENING Q2Y  07/23/2003    INFLUENZA AGE 9 TO ADULT  08/01/2017    Pneumococcal 65+ Low/Medium Risk (2 of 2 - PCV13) 11/14/2017    MEDICARE YEARLY EXAM  12/10/2017    DTaP/Tdap/Td series (2 - Td) 11/14/2026    OSTEOPOROSIS SCREENING (DEXA)  Completed       Nettie Conchita is a 76 y.o. female and presents with Shortness of Breath (non productive cough)     Subjective:  Pt with h/o chronic cough c/o continued coughing and dyspnea with ambulation. cxr nml. Initially tessalon was providing relief. Had recent swallow eval, which did not show risk of aspiration. Review of 2/2017 CT abd, shows emphysema in lower lung fields. Her daughter reported a \"coughing attack\" this past week. She smoked for 10 years, quitting about 40 years ago. ROS:  Constitutional: No recent weight change. No weakness/fatigue. No f/c. Cardiovascular: No CP/palpitations. No ALY/orthopnea/PND. Respiratory: + cough/sputum, dyspnea, wheezing. Gastointestinal: No dysphagia, reflux. No n/v.   No constipation/diarrhea. No melena/rectal bleeding. Neurological: No seizures/numbness/weakness. No paresthesias. Psychiatric:  No depression, anxiety. The problem list was updated as a part of today's visit. 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 SH:  Social History   Substance Use Topics    Smoking status: Former Smoker    Smokeless tobacco: Never Used    Alcohol use 0.6 oz/week     1 Glasses of wine per week       Medications/Allergies:  Current Outpatient Prescriptions on File Prior to Visit   Medication Sig Dispense Refill    escitalopram oxalate (LEXAPRO) 10 mg tablet Take 1 Tab by mouth daily. At 7pm 90 Tab 2    benzonatate (TESSALON) 200 mg capsule Take 1 Cap by mouth three (3) times daily as needed for Cough. 90 Cap 0    cholecalciferol, vitamin D3, 2,000 unit tab Take  by mouth.  mirabegron ER (MYRBETRIQ) 25 mg ER tablet Take 25 mg by mouth daily. No current facility-administered medications on file prior to visit.          Allergies   Allergen Reactions    Codeine Itching       Objective:  Visit Vitals    /66 (BP 1 Location: Left arm, BP Patient Position: Sitting)    Pulse 84    Temp 97.8 °F (36.6 °C) (Oral)    Resp 16    Ht 5' 4\" (1.626 m)    Wt 131 lb 6.4 oz (59.6 kg)    SpO2 98%    BMI 22.55 kg/m2      Constitutional: Well developed, nourished, no distress, alert   CV: S1, S2.  irreg irreg. No murmurs/rubs. No thrills palpated. No carotid bruits. Intact distal pulses. No edema. Pulm: No abnormalities on inspection. Clear to auscultation bilaterally. No wheezing/rhonchi. Normal effort. GI: Soft, nontender, nondistended. Normal active bowel sounds. Neuro:  RUE contracture. Speech dysarthric. Psych: Appropriate affect, judgement and insight. Short-term memory impaired.

## 2017-09-18 NOTE — MR AVS SNAPSHOT
Visit Information Date & Time Provider Department Dept. Phone Encounter #  
 9/18/2017  1:15 PM Yissel Crawley, [facility] [phone] [id number] Your Appointments 11/13/2017 11:00 AM  
Follow Up with Yissel Crawley MD  
[facility] [address]) Appt Note: 3 month f/u  
 [address] [facility] [address] [facility] [address]  
[phone]  
  
   
 [address] [facility] 280 [facility] Upcoming Health Maintenance Date Due ZOSTER VACCINE AGE 60> 5/23/1998 GLAUCOMA SCREENING Q2Y 7/23/2003 INFLUENZA AGE 9 TO ADULT 8/1/2017 Pneumococcal 65+ Low/Medium Risk (2 of 2 - PCV13) 11/14/2017 MEDICARE YEARLY EXAM 12/10/2017 DTaP/Tdap/Td series (2 - Td) 11/14/2026 Allergies as of 9/18/2017  Review Complete On: 9/18/2017 By: Yissel Crawley MD  
  
 Severity Noted Reaction Type Reactions Codeine  10/20/2016    Itching Current Immunizations  Never Reviewed Name Date Pneumococcal Polysaccharide (PPSV-23) 11/14/2016 Not reviewed this visit You Were Diagnosed With   
  
 Codes Comments Centrilobular emphysema ([facility] [address]    -  Primary ICD-10-CM: J43.2 ICD-9-CM: 492.8 Chronic cough     ICD-10-CM: R05 ICD-9-CM: 786.2 History of vertebral compression fracture     ICD-10-CM: Z87.81 ICD-9-CM: V15.51 History of hemorrhagic stroke with residual hemiplegia (HCC)     ICD-10-CM: R58.999 ICD-9-CM: 438.20 Vitals BP Pulse Temp Resp Height(growth percentile) Weight(growth percentile) 112/66 (BP 1 Location: Left arm, BP Patient Position: Sitting) 84 97.8 °F (36.6 °C) (Oral) 16 5' 4\" (1.626 m) 131 lb 6.4 oz (59.6 kg) SpO2 BMI OB Status Smoking Status 98% 22.55 kg/m2 Hysterectomy Former Smoker Vitals History BMI and BSA Data Body Mass Index Body Surface Area  
 22.55 kg/m 2 1.64 m 2 Preferred Pharmacy Pharmacy Name Phone Josephine Springer, [address] [phone] Your Updated Medication List  
  
   
This list is accurate as of: 9/18/17  1:35 PM.  Always use your most recent med list.  
  
  
  
  
 albuterol 90 mcg/actuation inhaler Commonly known as:  PROVENTIL HFA, VENTOLIN HFA, PROAIR HFA Take 2 Puffs by inhalation every four (4) hours as needed for Shortness of Breath (cough). benzonatate 200 mg capsule Commonly known as:  TESSALON Take 1 Cap by mouth three (3) times daily as needed for Cough. cholecalciferol (vitamin D3) 2,000 unit Tab Take  by mouth.  
  
 escitalopram oxalate 10 mg tablet Commonly known as:  Jeffrey Aas Take 1 Tab by mouth daily. At 7pm  
  
 MYRBETRIQ 25 mg ER tablet Generic drug:  mirabegron ER Take 25 mg by mouth daily. umeclidinium 62.5 mcg/actuation inhaler Commonly known as:  INCRUSE ELLIPTA Take 1 Puff by inhalation daily. Prescriptions Sent to Pharmacy Refills  
 umeclidinium (INCRUSE ELLIPTA) 62.5 mcg/actuation inhaler 0 Sig: Take 1 Puff by inhalation daily. Class: Normal  
 Pharmacy: [address] [place] [address], [address] [place] [address] Ph #: [phone] Route: Inhalation  
 albuterol (PROVENTIL HFA, VENTOLIN HFA, PROAIR HFA) 90 mcg/actuation inhaler 1 Sig: Take 2 Puffs by inhalation every four (4) hours as needed for Shortness of Breath (cough). Class: Normal  
 Pharmacy: [address] [place] [address], [address] [place] [address] Ph #: [phone] Route: Inhalation Patient Instructions Umeclidinium (By breathing) Umeclidinium (dq-[id number]-jb-DIN-ee-um) Treats chronic obstructive pulmonary disease (COPD). Brand Name(s): Incruse Ellipta There may be other brand names for this medicine. When This Medicine Should Not Be Used: This medicine is not right for everyone. Do not use it if you had an allergic reaction to umeclidinium or milk proteins. How to Use This Medicine:  
Powder · Your doctor will tell you how much medicine to use. Do not use more than directed. Use this medicine at the same time each day. · To use:  
¨ Open the cover of the inhaler until you hear a click. The inhaler is now ready to use. Do not close the cover until you have taken your dose. If you open and close the cover without inhaling the dose, you will lose the medicine. Do not take more than 1 puff per day. ¨ Before you inhale your dose, breathe out fully. Try to empty your lungs as much as possible. ¨ Place the mouthpiece of the inhaler in your mouth and breathe in a steady, deep breath. Do not breathe in through your nose. ¨ Remove the inhaler from your mouth. Hold your breath for about 3 to 4 seconds, then breathe out slowly. ¨ The inhaler has a window that shows the number of doses that are left. The counter will turn red when the inhaler has fewer than 10 doses left. This will remind you to refill your prescription. · Read and follow the patient instructions that come with this medicine. Talk to your doctor or pharmacist if you have any questions. · Missed dose: Take a dose as soon as you remember. If it is almost time for your next dose, wait until then and take a regular dose. Do not take extra medicine to make up for a missed dose. · Store the medicine in a closed container at room temperature, away from heat, moisture, and direct light. Keep the medicine in the foil tray. Do not open it until you are ready to use the inhaler for the first time. Throw away the medicine 6 weeks after you open it or when the counter reads 0. Drugs and Foods to Avoid: Ask your doctor or pharmacist before using any other medicine, including over-the-counter medicines, vitamins, and herbal products. · Some medicines can affect how umeclidinium works. Tell your doctor if you are using an anticholinergic medicine, such as the following: ¨ Aclidinium ¨ Atropine ¨ Ipratropium ¨ Tiotropium Warnings While Using This Medicine: · Tell your doctor if you are pregnant or breastfeeding, or if you have glaucoma, heart problems, trouble urinating, an enlarged prostate, or a bladder blockage. · This medicine may cause the following problems: 
¨ Increased trouble breathing (may be life-threatening) ¨ New or worse narrow-angle glaucoma ¨ New or worse trouble urinating · Do not use this medicine to treat a COPD attack. Your doctor may prescribe another medicine that you should use when you have a COPD flare-up. · Your doctor will check your progress and the effects of this medicine at regular visits. Keep all appointments. · Keep all medicine out of the reach of children. Never share your medicine with anyone. Possible Side Effects While Using This Medicine:  
Call your doctor right away if you notice any of these side effects: · Allergic reaction: Itching or hives, swelling in your face or hands, swelling or tingling in your mouth or throat, chest tightness, trouble breathing · Decrease in how much or how often you urinate, difficult or painful urination · Eye pain, blurred vision, other vision changes · Worse breathing problems If you notice other side effects that you think are caused by this medicine, tell your doctor. Call your doctor for medical advice about side effects. You may report side effects to FDA at 8-062-FDA-8216 © 2017 [address] Information is for End User's use only and may not be sold, redistributed or otherwise used for commercial purposes. The above information is an  only. It is not intended as medical advice for individual conditions or treatments. Talk to your doctor, nurse or pharmacist before following any medical regimen to see if it is safe and effective for you. Introducing \[facility]\"" & HEALTH SERVICES!    
 [employer] introduces [employer] patient portal. Now you can access parts of your medical record, email your doctor's office, and request medication refills online. 1. In your internet browser, go to [email or web address] 2. Click on the First Time User? Click Here link in the Sign In box. You will see the New Member Sign Up page. 3. Enter your Sy[employer]us Access Code exactly as it appears below. You will not need to use this code after youve completed the sign-up process. If you do not sign up before the expiration date, you must request a new code. · [employer] Access Code: [email or web address] Expires: 12/6/2017  3:44 PM 
 
4. Enter the last four digits of your Social Security Number (xxxx) and Date of Birth (mm/dd/yyyy) as indicated and click Submit. You will be taken to the next sign-up page. 5. Create a Syandus ID. This will be your Syandus login ID and cannot be changed, so think of one that is secure and easy to remember. 6. Create a Syandus password. You can change your password at any time. 7. Enter your Password Reset Question and Answer. This can be used at a later time if you forget your password. 8. Enter your e-mail address. You will receive e-mail notification when new information is available in [address] 9. Click Sign Up. You can now view and download portions of your medical record. 10. Click the Download Summary menu link to download a portable copy of your medical information. If you have questions, please visit the Frequently Asked Questions section of the [employer] website. Remember, [employer] is NOT to be used for urgent needs. For medical emergencies, dial 911. Now available from your iPhone and Android! Please provide this summary of care documentation to your next provider. Your primary care clinician is listed as Perla 13. If you have any questions after today's visit, please call [phone].

## 2017-09-18 NOTE — ASSESSMENT & PLAN NOTE
Stable, based on history, physical exam and review of pertinent labs, studies and medications; meds reconciled; continue current treatment plan. Key Neurological Meds     The patient is on no neurologic meds.         Lab Results   Component Value Date/Time    WBC 8.2 12/02/2016 09:40 AM    HGB 12.8 12/02/2016 09:40 AM    HCT 40.6 12/02/2016 09:40 AM    PLATELET 360 67/09/2746 09:40 AM    Creatinine 0.92 12/02/2016 09:40 AM    BUN 19 12/02/2016 09:40 AM

## 2017-09-21 ENCOUNTER — TELEPHONE (OUTPATIENT)
Dept: FAMILY MEDICINE CLINIC | Age: 77
End: 2017-09-21

## 2017-09-21 NOTE — TELEPHONE ENCOUNTER
Pt's daughter calling wondering if the new inhaler can cause anxiety. Pt is a lot more anxious since starting the new inhaler.

## 2017-09-21 NOTE — TELEPHONE ENCOUNTER
Per daughter, pt has had increased anxiety lately and she is only taking the Incruz, not albuterol. I gave the recommendation and she thanked us.

## 2017-10-06 ENCOUNTER — OFFICE VISIT (OUTPATIENT)
Dept: FAMILY MEDICINE CLINIC | Age: 77
End: 2017-10-06

## 2017-10-06 ENCOUNTER — TELEPHONE (OUTPATIENT)
Dept: FAMILY MEDICINE CLINIC | Age: 77
End: 2017-10-06

## 2017-10-06 VITALS
HEART RATE: 70 BPM | TEMPERATURE: 98.8 F | BODY MASS INDEX: 22.3 KG/M2 | WEIGHT: 130.6 LBS | SYSTOLIC BLOOD PRESSURE: 120 MMHG | OXYGEN SATURATION: 94 % | RESPIRATION RATE: 18 BRPM | DIASTOLIC BLOOD PRESSURE: 64 MMHG | HEIGHT: 64 IN

## 2017-10-06 DIAGNOSIS — I69.359 HISTORY OF HEMORRHAGIC STROKE WITH RESIDUAL HEMIPLEGIA (HCC): ICD-10-CM

## 2017-10-06 DIAGNOSIS — J43.2 CENTRILOBULAR EMPHYSEMA (HCC): Primary | ICD-10-CM

## 2017-10-06 DIAGNOSIS — I69.322 CVA, OLD, DYSARTHRIA: ICD-10-CM

## 2017-10-06 NOTE — PATIENT INSTRUCTIONS
Reassured that current treatment plans are appropriate, encouraged to ask for prn albuterol inhaler as needed.

## 2017-10-06 NOTE — PROGRESS NOTES
Vijaya Jean is a 76 y.o. female here for fatigue        Vijaya Jean is a 76 y.o. female (: 1940) presenting to address:    Chief Complaint   Patient presents with    Fatigue     pt states she doesn't feel well, since last  she's been feeling very weak and unsteady ( R hip pain )        Vitals:    10/06/17 1114   BP: 120/64   Pulse: 70   Resp: 18   Temp: 98.8 °F (37.1 °C)   TempSrc: Oral   SpO2: 94%   Weight: 130 lb 9.6 oz (59.2 kg)   Height: 5' 4\" (1.626 m)   PainSc:   0 - No pain       Hearing/Vision:   No exam data present    Learning Assessment:     Learning Assessment 10/20/2016   PRIMARY LEARNER Patient   PRIMARY LANGUAGE ENGLISH   LEARNER PREFERENCE PRIMARY DEMONSTRATION   ANSWERED BY patient   RELATIONSHIP SELF     Depression Screening:     PHQ over the last two weeks 2017   Little interest or pleasure in doing things Several days   Feeling down, depressed or hopeless Several days   Total Score PHQ 2 2     Fall Risk Assessment:     Fall Risk Assessment, last 12 mths 10/6/2017   Able to walk? Yes   Fall in past 12 months? No   Fall with injury? -   Number of falls in past 12 months -   Fall Risk Score -     Abuse Screening:     Abuse Screening Questionnaire 10/20/2016   Do you ever feel afraid of your partner? N   Are you in a relationship with someone who physically or mentally threatens you? N   Is it safe for you to go home? Y     Coordination of Care Questionaire:   1. Have you been to the ER, urgent care clinic since your last visit? Hospitalized since your last visit? NO    2. Have you seen or consulted any other health care providers outside of the [address] [place] [address] since your last visit? Include any pap smears or colon screening. NO    Advanced Directive:   1. Do you have an Advanced Directive? NO    2. Would you like information on Advanced Directives?  NO

## 2017-10-06 NOTE — MR AVS SNAPSHOT
Visit Information Date & Time Provider Department Dept. Phone Encounter #  
 10/6/2017 11:15 AM Khang Casiano MD 3 [facility] [phone] [id number] Your Appointments 11/13/2017 11:00 AM  
Follow Up with Stephanie Bhardwaj MD  
3 [facility] [address] [facility]) Appt Note: 3 month f/u  
 [address] [facility] [id number] [phone]  
  
   
 [address]  [address] [facility] Upcoming Health Maintenance Date Due ZOSTER VACCINE AGE 60> 5/23/1998 GLAUCOMA SCREENING Q2Y 7/23/2003 Pneumococcal 65+ Low/Medium Risk (2 of 2 - PCV13) 11/14/2017 MEDICARE YEARLY EXAM 12/10/2017 DTaP/Tdap/Td series (2 - Td) 11/14/2026 Allergies as of 10/6/2017  Review Complete On: 10/6/2017 By: Khang Casiano MD  
  
 Severity Noted Reaction Type Reactions Codeine  10/20/2016    Itching Current Immunizations  Never Reviewed Name Date Influenza High Dose Vaccine PF 9/18/2017  1:45 PM  
 Pneumococcal Polysaccharide (PPSV-23) 11/14/2016 Not reviewed this visit You Were Diagnosed With   
  
 Codes Comments Centrilobular emphysema ([facility] [address]    -  Primary ICD-10-CM: J43.2 ICD-9-CM: 492.8   
 CVA, old, dysarthria     ICD-10-CM: A90.806 ICD-9-CM: 438.13 History of hemorrhagic stroke with residual hemiplegia (HCC)     ICD-10-CM: K39.147 ICD-9-CM: 438.20 Vitals BP Pulse Temp Resp Height(growth percentile) Weight(growth percentile) 120/64 (BP 1 Location: Left arm, BP Patient Position: Sitting) 70 98.8 °F (37.1 °C) (Oral) 18 5' 4\" (1.626 m) 130 lb 9.6 oz (59.2 kg) SpO2 BMI OB Status Smoking Status 94% 22.42 kg/m2 Hysterectomy Former Smoker BMI and BSA Data Body Mass Index Body Surface Area  
 22.42 kg/m 2 1.63 m 2 Preferred Pharmacy Pharmacy Name Phone [facility] [phone] Your Updated Medication List  
  
   
This list is accurate as of: 10/6/17 11:36 AM.  Always use your most recent med list.  
  
  
  
  
 albuterol 90 mcg/actuation inhaler Commonly known as:  PROVENTIL HFA, VENTOLIN HFA, PROAIR HFA Take 2 Puffs by inhalation every four (4) hours as needed for Shortness of Breath (cough). benzonatate 200 mg capsule Commonly known as:  TESSALON Take 1 Cap by mouth three (3) times daily as needed for Cough. cholecalciferol (vitamin D3) 2,000 unit Tab Take  by mouth.  
  
 escitalopram oxalate 10 mg tablet Commonly known as:  Constanza Levo Take 1 Tab by mouth daily. At 7pm  
  
 umeclidinium 62.5 mcg/actuation inhaler Commonly known as:  INCRUSE ELLIPTA Take 1 Puff by inhalation daily. Patient Instructions Reassured that current treatment plans are appropriate, encouraged to ask for prn albuterol inhaler as needed. Introducing [facility] SERVICES! Nasir Nogueira introduces [employer] patient portal. Now you can access parts of your medical record, email your doctor's office, and request medication refills online. 1. In your internet browser, go to [email or web address] 2. Click on the First Time User? Click Here link in the Sign In box. You will see the New Member Sign Up page. 3. Enter your [employer] Access Code exactly as it appears below. You will not need to use this code after youve completed the sign-up process. If you do not sign up before the expiration date, you must request a new code. · [employer] Access Code: [email or web address] Expires: 12/6/2017  3:44 PM 
 
4. Enter the last four digits of your Social Security Number (xxxx) and Date of Birth (mm/dd/yyyy) as indicated and click Submit. You will be taken to the next sign-up page. 5. Create a [employer] ID. This will be your [employer] login ID and cannot be changed, so think of one that is secure and easy to remember. 6. Create a VIAP password. You can change your password at any time. 7. Enter your Password Reset Question and Answer. This can be used at a later time if you forget your password. 8. Enter your e-mail address. You will receive e-mail notification when new information is available in [address] 9. Click Sign Up. You can now view and download portions of your medical record. 10. Click the Download Summary menu link to download a portable copy of your medical information. If you have questions, please visit the Frequently Asked Questions section of the VI[employer] website. Remember, VIAP is NOT to be used for urgent needs. For medical emergencies, dial 911. Now available from your iPhone and Android! Please provide this summary of care documentation to your next provider. Your primary care clinician is listed as Perla 13. If you have any questions after today's visit, please call [phone].

## 2017-10-06 NOTE — PROGRESS NOTES
HISTORY OF PRESENT ILLNESS  Sj Myles is a 76 y.o. female. HPI Comments: Ms. Penny Andres is anxious about multiple chronic issues. She denies pain but reports that her right leg doesn't work as well since a remote CVA and also ORIF of a femur fracture. She has been diagnosed with COPD and sometimes feels short of breath in the morning. She has an albuterol inhaler ordered prn, but according to her daughter, does not remember to ask for it. She has previously been to speech therapy for dysarthria and was discharged after achieving maximum benefit but still feels she doesn't speak clearly at times. REports that she sleeps well but still feels fatigued at times. Her daughter feels that reassurance is needed. Fatigue   The history is provided by the patient and medical records. This is a recurrent problem. Associated symptoms include shortness of breath (sometimes in the morning-see HPI). Pertinent negatives include no chest pain, no abdominal pain and no headaches. 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 , Disp: 30 Inhaler, Rfl: 0    albuterol (PROVENTIL HFA, VENTOLIN HFA, PROAIR HFA) 90 mcg/actuation inhaler, Take 2 Puffs by inhalation every four (4) hours as needed for Shortness of Breath (cough). , Disp: 1 Inhaler, Rfl: 1    escitalopram oxalate (LEXAPRO) 10 mg tablet, Take 1 Tab by mouth daily. At 7pm, Disp: 90 Tab, Rfl: 2    benzonatate (TESSALON) 200 mg capsule, Take 1 Cap by mouth three (3) times daily as needed for Cough. , Disp: 90 Cap, Rfl: 0    cholecalciferol, vitamin D3, 2,000 unit tab, Take  by mouth., Disp: , Rfl:       Review of Systems   Constitutional: Positive for fatigue and malaise/fatigue. Respiratory: Positive for shortness of breath (sometimes in the morning-see HPI). Cardiovascular: Negative for chest pain and palpitations. Gastrointestinal: Negative for abdominal pain, nausea and vomiting. Neurological: Positive for speech change (episodic) and focal weakness (right side since remote CVA - stable). Negative for dizziness and headaches. Sometimes feels unsteady   Psychiatric/Behavioral: Positive for memory loss. The patient is nervous/anxious (about her health). Visit Vitals    /64 (BP 1 Location: Left arm, BP Patient Position: Sitting)    Pulse 70    Temp 98.8 °F (37.1 °C) (Oral)    Resp 18    Ht 5' 4\" (1.626 m)    Wt 130 lb 9.6 oz (59.2 kg)    SpO2 94%    BMI 22.42 kg/m2       Physical Exam   Constitutional: She is oriented to person, place, and time. She appears well-developed and well-nourished. HENT:   Head: Normocephalic. Eyes: Conjunctivae and EOM are normal.   Neck: Neck supple. Cardiovascular: Normal rate, regular rhythm and normal heart sounds. Pulmonary/Chest: Effort normal and breath sounds normal.   Musculoskeletal: She exhibits no edema. Neurological: She is alert and oriented to person, place, and time. Skin: Skin is warm and dry. Psychiatric: She has a normal mood and affect. Her behavior is normal.   Nursing note and vitals reviewed. ASSESSMENT and PLAN    ICD-10-CM ICD-9-CM    1.  Centrilobular emphysema (HCC) J43.2 492.8    2. CVA, old, dysarthria I69.322 438.13    3. History of hemorrhagic stroke with residual hemiplegia [facility]) I69.359 438.20    Reassured that current treatment plans are appropriate, encouraged to ask for prn albuterol inhaler as needed. Suggested that daughter ask attendants at the facility to ask the patient if she needs to use the inhaler.

## 2017-10-13 DIAGNOSIS — J43.2 CENTRILOBULAR EMPHYSEMA (HCC): ICD-10-CM

## 2017-10-16 NOTE — TELEPHONE ENCOUNTER
Left msg with [facility] nurse to call back. Pt refused [facility] speech therapy. [facility]. Note received from [facility] today states \" Resident declined speech therapy and prefer to receive speech therapy at this facility- Na[facility] Alexander, [address]. Need clarification.

## 2017-10-19 DIAGNOSIS — I69.322 CVA, OLD, DYSARTHRIA: ICD-10-CM

## 2017-10-19 DIAGNOSIS — I69.359 HISTORY OF HEMORRHAGIC STROKE WITH RESIDUAL HEMIPLEGIA (HCC): Primary | ICD-10-CM

## 2017-10-27 ENCOUNTER — TELEPHONE (OUTPATIENT)
Dept: FAMILY MEDICINE CLINIC | Age: 77
End: 2017-10-27

## 2017-10-27 NOTE — TELEPHONE ENCOUNTER
[facility] home health is calling to notify the provider that the patient  has been admitted to Home health today. Patient is using home health for Speech Therapy and a couple nursing visits due to the patient falling 10/20/17. If there are any question please call [facility] Nurse.      [facility]   [phone]

## 2017-11-03 ENCOUNTER — OFFICE VISIT (OUTPATIENT)
Dept: FAMILY MEDICINE CLINIC | Age: 77
End: 2017-11-03

## 2017-11-03 VITALS
SYSTOLIC BLOOD PRESSURE: 120 MMHG | DIASTOLIC BLOOD PRESSURE: 60 MMHG | WEIGHT: 130.2 LBS | TEMPERATURE: 98.1 F | BODY MASS INDEX: 22.23 KG/M2 | OXYGEN SATURATION: 97 % | HEART RATE: 75 BPM | RESPIRATION RATE: 20 BRPM | HEIGHT: 64 IN

## 2017-11-03 DIAGNOSIS — R26.81 UNSTABLE GAIT: ICD-10-CM
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
## 2017-11-03 DIAGNOSIS — N32.81 OAB (OVERACTIVE BLADDER): ICD-10-CM

## 2017-11-03 DIAGNOSIS — M53.3 SACRAL PAIN: Primary | ICD-10-CM

## 2017-11-03 NOTE — PROGRESS NOTES
Jonas Ayala is a 76 y.o. female (: 1940) presenting to address:    Chief Complaint   Patient presents with    Hypothyroidism    Cerebrovascular Accident    Vitamin D Deficiency       Vitals:    17 1127   BP: 120/60   Pulse: 75   Resp: 20   Temp: 98.1 °F (36.7 °C)   TempSrc: Oral   SpO2: 97%   Weight: 130 lb 3.2 oz (59.1 kg)   Height: 5' 4\" (1.626 m)   PainSc:   2   PainLoc: Back       Learning Assessment:     Learning Assessment 10/20/2016   PRIMARY LEARNER Patient   PRIMARY LANGUAGE ENGLISH   LEARNER PREFERENCE PRIMARY DEMONSTRATION   ANSWERED BY patient   RELATIONSHIP SELF     Depression Screening:     PHQ over the last two weeks 2017   Little interest or pleasure in doing things Several days   Feeling down, depressed or hopeless Several days   Total Score PHQ 2 2     Fall Risk Assessment:     Fall Risk Assessment, last 12 mths 11/3/2017   Able to walk? Yes   Fall in past 12 months? Yes   Fall with injury? No   Number of falls in past 12 months 4   Fall Risk Score 4     Abuse Screening:     Abuse Screening Questionnaire 10/20/2016   Do you ever feel afraid of your partner? N   Are you in a relationship with someone who physically or mentally threatens you? N   Is it safe for you to go home? Y     Coordination of Care Questionaire:   1. Have you been to the ER, urgent care clinic since your last visit? Hospitalized since your last visit? YES. [facility]-ER 10/19/17    2. Have you seen or consulted any other health care providers outside of the [address] [place] [address] since your last visit? Include any pap smears or colon screening. NO    Advanced Directive:   1. Do you have an Advanced Directive? YES    2. Would you like information on Advanced Directives?  NO

## 2017-11-03 NOTE — PROGRESS NOTES
Assessment/Plan:    1. Sacral pain after fall- cont icy hot topical.  PT.  - REFERRAL TO PHYSICAL THERAPY      2. Unstable gait  - REFERRAL TO PHYSICAL THERAPY    4. OAB (overactive bladder)  -incr dose myrbetriq    The plan was discussed with the patient. The patient verbalized understanding and is in agreement with the plan. All medication potential side effects were discussed with the patient. Health Maintenance:   Health Maintenance   Topic Date Due    ZOSTER VACCINE AGE 60>  05/23/1998    GLAUCOMA SCREENING Q2Y  07/23/2003    Pneumococcal 65+ Low/Medium Risk (2 of 2 - PCV13) 11/14/2017    MEDICARE YEARLY EXAM  12/10/2017    DTaP/Tdap/Td series (2 - Td) 11/14/2026    OSTEOPOROSIS SCREENING (DEXA)  Completed    INFLUENZA AGE 9 TO ADULT  Addressed       Tray Martinez is a 76 y.o. female and presents with Hypothyroidism; Cerebrovascular Accident; and Vitamin D Deficiency     Subjective:  Pt fell 10/19/17 onto her R side (turned around and fell backward, hitting head). Since then, has started having bilat sacral pain b/c she has been walking different. She is walking like a duck. CT head was stable. She has had several falls. Has been using icy hot for pain with relief. OAB- still having significant sx. On myrbetriq 25. ROS:  Constitutional: No recent weight change. No weakness/fatigue. No f/c. Cardiovascular: No CP/palpitations. No ALY/orthopnea/PND. Respiratory: No cough/sputum, dyspnea, wheezing. Gastointestinal: No dysphagia, reflux. No n/v. No constipation/diarrhea. No melena/rectal bleeding. Neurological: No seizures/numbness/weakness. No paresthesias. The problem list was updated as a part of today's visit.   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    Paroxysmal atrial fibrillation (HCC) I48.0    Contraindication to anticoagulation therapy Z53.09    Frequent falls R29.6    Right hand weakness R29.898    Contracture, right hand M24.541    Pure hypercholesterolemia E78.00    Full code status Z78.9    Subclinical hypothyroidism E03.9    OAB (overactive bladder) N32.81    Dysthymia F34.1    Vitamin D deficiency E55.9    First degree AV block I44.0    Chronic cough R05    History of hemorrhagic stroke with residual hemiplegia (HCC) I69.359    History of vertebral compression fracture Z87.81    Centrilobular emphysema (HCC) J43.2       The PSH, FH were reviewed. SH:  Social History   Substance Use Topics    Smoking status: Former Smoker     Years: 10.00    Smokeless tobacco: Never Used    Alcohol use 0.6 oz/week     1 Glasses of wine per week       Medications/Allergies:  Current Outpatient Prescriptions on File Prior to Visit   Medication Sig Dispense Refill    umeclidinium (INCRUSE ELLIPTA) 62.5 mcg/actuation inhaler Take 1 Puff by inhalation daily. 30 Inhaler 3    albuterol (PROVENTIL HFA, VENTOLIN HFA, PROAIR HFA) 90 mcg/actuation inhaler Take 2 Puffs by inhalation every four (4) hours as needed for Shortness of Breath (cough). 1 Inhaler 1    escitalopram oxalate (LEXAPRO) 10 mg tablet Take 1 Tab by mouth daily. At 7pm 90 Tab 2    cholecalciferol, vitamin D3, 2,000 unit tab Take  by mouth. No current facility-administered medications on file prior to visit. Allergies   Allergen Reactions    Codeine Itching       Objective:  Visit Vitals    /60 (BP 1 Location: Left arm, BP Patient Position: Sitting)    Pulse 75    Temp 98.1 °F (36.7 °C) (Oral)    Resp 20    Ht 5' 4\" (1.626 m)    Wt 130 lb 3.2 oz (59.1 kg)    SpO2 97%    BMI 22.35 kg/m2      Constitutional: Well developed, nourished, no distress, alert   CV: S1, S2.  RRR. No murmurs/rubs. No thrills palpated. No carotid bruits. Intact distal pulses. No edema. Pulm: No abnormalities on inspection. Clear to auscultation bilaterally. No wheezing/rhonchi. Normal effort.      MS: Gait wide based with externally rotated feet placement (duck like). Pain over bilat sacroiliac region. No lumbar paraspinal tenderness. Neuro: No focal motor deficits (5/5 strength bilat LE) Speech dysarthric   Skin: No lesions/rashes on inspection.

## 2017-11-20 ENCOUNTER — HOSPITAL ENCOUNTER (OUTPATIENT)
Dept: LAB | Age: 77
Discharge: HOME | End: 2017-11-20
Payer: MEDICARE

## 2017-11-20 ENCOUNTER — OFFICE VISIT (OUTPATIENT)
Dept: FAMILY MEDICINE CLINIC | Age: 77
End: 2017-11-20

## 2017-11-20 VITALS
DIASTOLIC BLOOD PRESSURE: 62 MMHG | HEART RATE: 68 BPM | SYSTOLIC BLOOD PRESSURE: 120 MMHG | BODY MASS INDEX: 22.2 KG/M2 | OXYGEN SATURATION: 97 % | WEIGHT: 130 LBS | HEIGHT: 64 IN | TEMPERATURE: 97.9 F | RESPIRATION RATE: 18 BRPM

## 2017-11-20 DIAGNOSIS — J43.2 CENTRILOBULAR EMPHYSEMA (HCC): ICD-10-CM

## 2017-11-20 DIAGNOSIS — N32.81 OAB (OVERACTIVE BLADDER): ICD-10-CM

## 2017-11-20 DIAGNOSIS — N30.00 ACUTE CYSTITIS WITHOUT HEMATURIA: ICD-10-CM

## 2017-11-20 DIAGNOSIS — R41.0 CONFUSION: Primary | ICD-10-CM

## 2017-11-20 DIAGNOSIS — R45.86 MOOD CHANGE: ICD-10-CM

## 2017-11-20 LAB
BILIRUB UR QL STRIP: NEGATIVE
GLUCOSE UR-MCNC: NEGATIVE MG/DL
KETONES P FAST UR STRIP-MCNC: NEGATIVE MG/DL
PH UR STRIP: 5.5 [PH] (ref 4.6–8)
PROT UR QL STRIP: NEGATIVE
SP GR UR STRIP: 1.01 (ref 1–1.03)
UA UROBILINOGEN AMB POC: NORMAL (ref 0.2–1)
URINALYSIS CLARITY POC: CLEAR
URINALYSIS COLOR POC: YELLOW
URINE BLOOD POC: NEGATIVE
URINE LEUKOCYTES POC: NORMAL
URINE NITRITES POC: NEGATIVE

## 2017-11-20 PROCEDURE — 87086 URINE CULTURE/COLONY COUNT: CPT | Performed by: INTERNAL MEDICINE

## 2017-11-20 RX ORDER — AMOXICILLIN AND CLAVULANATE POTASSIUM 500; 125 MG/1; MG/1
1 TABLET, FILM COATED ORAL 2 TIMES DAILY
Qty: 14 TAB | Refills: 0 | Status: SHIPPED | OUTPATIENT
Start: 2017-11-20 | End: 2017-11-27

## 2017-11-20 NOTE — MR AVS SNAPSHOT
Visit Information Date & Time Provider Department Dept. Phone Encounter #  
 11/20/2017 10:15 AM Lavelle Krause MD 3 [facility] [phone] [id number] Your Appointments 12/22/2017 11:00 AM  
Office Visit with Lavelle Krause MD  
3 [facility] [address]) Appt Note: MWV/6 week f/u  
 [address] [facility] [address] [place] [address] [id number] [phone]  
  
   
 [address] [place] [address] Upcoming Health Maintenance Date Due ZOSTER VACCINE AGE 60> 5/23/1998 GLAUCOMA SCREENING Q2Y 7/23/2003 Pneumococcal 65+ Low/Medium Risk (2 of 2 - PCV13) 11/14/2017 MEDICARE YEARLY EXAM 12/10/2017 DTaP/Tdap/Td series (2 - Td) 11/14/2026 Allergies as of 11/20/2017  Review Complete On: 11/20/2017 By: Orin Britton Severity Noted Reaction Type Reactions Codeine  10/20/2016    Itching Current Immunizations  Never Reviewed Name Date Influenza High Dose Vaccine PF 9/18/2017  1:45 PM  
 Pneumococcal Polysaccharide (PPSV-23) 11/14/2016 Not reviewed this visit You Were Diagnosed With   
  
 Codes Comments Confusion    -  Primary ICD-10-CM: R41.0 ICD-9-CM: 298.9 Mood change ([facility] [address]     ICD-10-CM: F39 
ICD-9-CM: 296.90 Acute cystitis without hematuria     ICD-10-CM: N30.00 ICD-9-CM: 595.0   
 OAB (overactive bladder)     ICD-10-CM: N32.81 ICD-9-CM: 596.51 Centrilobular emphysema ([facility] [address]     ICD-10-CM: J43.2 ICD-9-CM: 492.8 Vitals BP Pulse Temp Resp Height(growth percentile) Weight(growth percentile) 120/62 (BP 1 Location: Left arm, BP Patient Position: Sitting) 68 97.9 °F (36.6 °C) (Oral) 18 5' 4\" (1.626 m) 130 lb (59 kg) SpO2 BMI OB Status Smoking Status 97% 22.31 kg/m2 Hysterectomy Former Smoker Vitals History BMI and BSA Data Body Mass Index Body Surface Area  
 22.31 kg/m 2 1.63 m 2 Preferred Pharmacy Pharmacy Name Phone [facility], [address] [phone] Your Updated Medication List  
  
   
This list is accurate as of: 11/20/17 11:06 AM.  Always use your most recent med list.  
  
  
  
  
 albuterol 90 mcg/actuation inhaler Commonly known as:  PROVENTIL HFA, VENTOLIN HFA, PROAIR HFA Take 2 Puffs by inhalation every four (4) hours as needed for Shortness of Breath (cough). amoxicillin-clavulanate 500-125 mg per tablet Commonly known as:  AUGMENTIN Take 1 Tab by mouth two (2) times a day for 7 days. cholecalciferol (vitamin D3) 2,000 unit Tab Take  by mouth.  
  
 escitalopram oxalate 10 mg tablet Commonly known as:  Rl Barban Take 1 Tab by mouth daily. At 7pm  
  
 mirabegron ER 25 mg ER tablet Commonly known as:  MYRBETRIQ Take 1 Tab by mouth daily. Indications: Bladder Hyperactivity  
  
 umeclidinium 62.5 mcg/actuation inhaler Commonly known as:  INCRUSE ELLIPTA Take 1 Puff by inhalation daily. Indications: PREVENTION OF BRONCHOSPASMS WITH EMPHYSEMA Prescriptions Sent to Pharmacy Refills  
 mirabegron ER (MYRBETRIQ) 25 mg ER tablet 0 Sig: Take 1 Tab by mouth daily. Indications: Bladder Hyperactivity Class: Normal  
 Pharmacy: [address] [place] [address], [address] [place] [address] Ph #: [phone] Route: Oral  
 umeclidinium (INCRUSE ELLIPTA) 62.5 mcg/actuation inhaler 3 Sig: Take 1 Puff by inhalation daily. Indications: PREVENTION OF BRONCHOSPASMS WITH EMPHYSEMA Class: Normal  
 Pharmacy: [address] [place], [place] [address]er , [address] [place] [address] Ph #: [phone] Route: Inhalation  
 amoxicillin-clavulanate (AUGMENTIN) 500-125 mg per tablet 0 Sig: Take 1 Tab by mouth two (2) times a day for 7 days. Class: Normal  
 Pharmacy: [address] [place], [place] [address] , [address] [place] [address] Ph #: [phone] Route: Oral  
  
We Performed the Following AMB POC URINALYSIS DIP STICK AUTO W/O MICRO [09802 CPT(R)] To-Do List   
 11/20/2017 Microbiology:  CULTURE, URINE Introducing [address]! Mayela Antonio introduces [employer] patient portal. Now you can access parts of your medical record, email your doctor's office, and request medication refills online. 1. In your internet browser, go to [email or web address] 2. Click on the First Time User? Click Here link in the Sign In box. You will see the New Member Sign Up page. 3. Enter your [employer] Access Code exactly as it appears below. You will not need to use this code after youve completed the sign-up process. If you do not sign up before the expiration date, you must request a new code. · [employer] Access Code: [email or web address] Expires: 12/6/2017  2:44 PM 
 
4. Enter the last four digits of your Social Security Number (xxxx) and Date of Birth (mm/dd/yyyy) as indicated and click Submit. You will be taken to the next sign-up page. 5. Create a [employer] ID. This will be your [employer] login ID and cannot be changed, so think of one that is secure and easy to remember. 6. Create a [employer] password. You can change your password at any time. 7. Enter your Password Reset Question and Answer. This can be used at a later time if you forget your password. 8. Enter your e-mail address. You will receive e-mail notification when new information is available in [address] 9. Click Sign Up. You can now view and download portions of your medical record. 10. Click the Download Summary menu link to download a portable copy of your medical information. If you have questions, please visit the Frequently Asked Questions section of the [employer] website. Remember, [employer] is NOT to be used for urgent needs. For medical emergencies, dial 911. Now available from your iPhone and Android! Please provide this summary of care documentation to your next provider. Your primary care clinician is listed as Perla Basurto. If you have any questions after today's visit, please call [phone].

## 2017-11-20 NOTE — PROGRESS NOTES
Brian Escalante is a 76 y.o. female (: 1940) presenting to address:    Chief Complaint   Patient presents with    Medication Evaluation    Crying       Vitals:    17 1017   BP: 120/62   Pulse: 68   Resp: 18   Temp: 97.9 °F (36.6 °C)   TempSrc: Oral   SpO2: 97%   Weight: 130 lb (59 kg)   Height: 5' 4\" (1.626 m)   PainSc:   0 - No pain       Learning Assessment:     Learning Assessment 10/20/2016   PRIMARY LEARNER Patient   PRIMARY LANGUAGE ENGLISH   LEARNER PREFERENCE PRIMARY DEMONSTRATION   ANSWERED BY patient   RELATIONSHIP SELF     Depression Screening:     PHQ over the last two weeks 2017   Little interest or pleasure in doing things Several days   Feeling down, depressed or hopeless Several days   Total Score PHQ 2 2     Fall Risk Assessment:     Fall Risk Assessment, last 12 mths 11/3/2017   Able to walk? Yes   Fall in past 12 months? Yes   Fall with injury? No   Number of falls in past 12 months 4   Fall Risk Score 4     Abuse Screening:     Abuse Screening Questionnaire 2017   Do you ever feel afraid of your partner? N   Are you in a relationship with someone who physically or mentally threatens you? N   Is it safe for you to go home? Y     Coordination of Care Questionaire:   1. Have you been to the ER, urgent care clinic since your last visit? Hospitalized since your last visit? NO    2. Have you seen or consulted any other health care providers outside of the [address] [place] [address] since your last visit? Include any pap smears or colon screening. NO    Advanced Directive:   1. Do you have an Advanced Directive? YES    2. Would you like information on Advanced Directives?  NO

## 2017-11-20 NOTE — PROGRESS NOTES
Assessment/Plan:    1. Confusion and mood change- suspect related to UTI. Will treat. Could also be related to increased myrbetriq. Will decr back to 25mg. - AMB POC URINALYSIS DIP STICK AUTO W/O MICRO    2. Acute cystitis without hematuria  - CULTURE, URINE; Future    3. OAB (overactive bladder)  -decr dose  - mirabegron ER (MYRBETRIQ) 25 mg ER tablet; Take 1 Tab by mouth daily. Indications: Bladder Hyperactivity  Dispense: 90 Tab; Refill: 0    4. Centrilobular emphysema (HCC)  -refilled  - umeclidinium (INCRUSE ELLIPTA) 62.5 mcg/actuation inhaler; Take 1 Puff by inhalation daily. Indications: PREVENTION OF BRONCHOSPASMS WITH EMPHYSEMA  Dispense: 30 Inhaler; Refill: 3    The plan was discussed with the patient. The patient verbalized understanding and is in agreement with the plan. All medication potential side effects were discussed with the patient. Health Maintenance:   Health Maintenance   Topic Date Due    ZOSTER VACCINE AGE 60>  05/23/1998    GLAUCOMA SCREENING Q2Y  07/23/2003    Pneumococcal 65+ Low/Medium Risk (2 of 2 - PCV13) 11/14/2017    MEDICARE YEARLY EXAM  12/10/2017    DTaP/Tdap/Td series (2 - Td) 11/14/2026    OSTEOPOROSIS SCREENING (DEXA)  Completed    Influenza Age 5 to Adult  Addressed       Anirudh Coy is a 76 y.o. female and presents with Medication Evaluation and Crying     Subjective:  Daughter presents history. A week ago, apparently, pt got \"night time meds\" and thought they were incorrect at her facility. She was told her lexapro was for depression, and she feels she doesn't have depression. Since then, she has been confused (couldn't figure out how to call her daughter), upset, crying. She is concerned that her medications at night are \"making her cry\". Daughter reports she's paranoid. At last visit dose myrbetriq was increased. She won't remember getting/taking meds. ROS:  Constitutional: No recent weight change. No weakness/fatigue. No f/c. Cardiovascular: No CP/palpitations. No ALY/orthopnea/PND. Respiratory: No cough/sputum, dyspnea, wheezing. Neurological: No seizures/numbness/weakness. No paresthesias. Psychiatric:  + depression, no anxiety. The problem list was updated as a part of today's visit. 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    Paroxysmal atrial fibrillation (HCC) I48.0    Contraindication to anticoagulation therapy Z53.09    Frequent falls R29.6    Right hand weakness R29.898    Contracture, right hand M24.541    Pure hypercholesterolemia E78.00    Full code status Z78.9    Subclinical hypothyroidism E03.9    OAB (overactive bladder) N32.81    Dysthymia F34.1    Vitamin D deficiency E55.9    First degree AV block I44.0    Chronic cough R05    History of hemorrhagic stroke with residual hemiplegia (HCC) I69.359    History of vertebral compression fracture Z87.81    Centrilobular emphysema ([facility]) J43.2       The PSH, FH were reviewed. SH:  Social History   Substance Use Topics    Smoking status: Former Smoker     Years: 10.00    Smokeless tobacco: Never Used    Alcohol use 0.6 oz/week     1 Glasses of wine per week       Medications/Allergies:  Current Outpatient Prescriptions on File Prior to Visit   Medication Sig Dispense Refill    mirabegron ER (MYRBETRIQ) 50 mg ER tablet Take 1 Tab by mouth daily. 90 Tab 0    umeclidinium (INCRUSE ELLIPTA) 62.5 mcg/actuation inhaler Take 1 Puff by inhalation daily. 30 Inhaler 3    albuterol (PROVENTIL HFA, VENTOLIN HFA, PROAIR HFA) 90 mcg/actuation inhaler Take 2 Puffs by inhalation every four (4) hours as needed for Shortness of Breath (cough). 1 Inhaler 1    escitalopram oxalate (LEXAPRO) 10 mg tablet Take 1 Tab by mouth daily. At 7pm 90 Tab 2    cholecalciferol, vitamin D3, 2,000 unit tab Take  by mouth.        No current facility-administered medications on file prior to visit. Allergies   Allergen Reactions    Codeine Itching       Objective:  Visit Vitals    /62 (BP 1 Location: Left arm, BP Patient Position: Sitting)    Pulse 68    Temp 97.9 °F (36.6 °C) (Oral)    Resp 18    Ht 5' 4\" (1.626 m)    Wt 130 lb (59 kg)    SpO2 97%    BMI 22.31 kg/m2      Constitutional: Well developed, nourished, no distress, alert   CV: S1, S2.  RRR. No murmurs/rubs. No thrills palpated. No carotid bruits. Intact distal pulses. No edema. Pulm: No abnormalities on inspection. Clear to auscultation bilaterally. No wheezing/rhonchi. Normal effort. Neuro: No focal motor or sensory deficits. Speech dysarthric   Psych: depressed affect, appropriate judgement and insight. Short-term memory intact. Urine dipstick shows positive for leukocytes.

## 2017-11-22 LAB
BACTERIA SPEC CULT: ABNORMAL
SERVICE CMNT-IMP: ABNORMAL

## 2017-11-27 ENCOUNTER — TELEPHONE (OUTPATIENT)
Dept: FAMILY MEDICINE CLINIC | Age: 77
End: 2017-11-27

## 2017-11-27 NOTE — TELEPHONE ENCOUNTER
Joshua is calling on be half of the patient stating that the last time that the patient was in the office the Provider lowered the dose of her \"over active bladder medication. \"   The new prescription has been sent to the pharmacy but the nurse needs to follow up with the facility with the new dose.      Facility:  [facility] [phone]    Please assist.

## 2017-11-30 ENCOUNTER — TELEPHONE (OUTPATIENT)
Dept: FAMILY MEDICINE CLINIC | Age: 77
End: 2017-11-30

## 2017-11-30 NOTE — TELEPHONE ENCOUNTER
Daughter calling stating [facility] needs a d/c order on the 50mg Myrbetirq and a start order on the 25mg; please f/u

## 2017-11-30 NOTE — TELEPHONE ENCOUNTER
Daughter is calling stating that [facility] needs a D/c order on the 50 mg Myrbertiq and a start order on the 25mg, please f/u

## 2017-12-18 RX ORDER — DICLOFENAC SODIUM 10 MG/G
GEL TOPICAL 4 TIMES DAILY
Qty: 100 G | Refills: 11 | Status: SHIPPED | OUTPATIENT
Start: 2017-12-18 | End: 2017-12-19 | Stop reason: ALTCHOICE

## 2017-12-19 ENCOUNTER — TELEPHONE (OUTPATIENT)
Dept: FAMILY MEDICINE CLINIC | Age: 77
End: 2017-12-19

## 2017-12-19 ENCOUNTER — OFFICE VISIT (OUTPATIENT)
Dept: FAMILY MEDICINE CLINIC | Age: 77
End: 2017-12-19

## 2017-12-19 VITALS
RESPIRATION RATE: 16 BRPM | HEART RATE: 73 BPM | DIASTOLIC BLOOD PRESSURE: 60 MMHG | TEMPERATURE: 97.8 F | HEIGHT: 64 IN | SYSTOLIC BLOOD PRESSURE: 128 MMHG | WEIGHT: 129 LBS | BODY MASS INDEX: 22.02 KG/M2 | OXYGEN SATURATION: 94 %

## 2017-12-19 DIAGNOSIS — K59.00 CONSTIPATION, UNSPECIFIED CONSTIPATION TYPE: ICD-10-CM

## 2017-12-19 DIAGNOSIS — S61.212A LACERATION OF RIGHT MIDDLE FINGER WITHOUT FOREIGN BODY WITHOUT DAMAGE TO NAIL, INITIAL ENCOUNTER: ICD-10-CM

## 2017-12-19 DIAGNOSIS — S32.040A TRAUMATIC COMPRESSION FRACTURE OF L4 LUMBAR VERTEBRA, CLOSED, INITIAL ENCOUNTER (HCC): Primary | ICD-10-CM

## 2017-12-19 DIAGNOSIS — R29.6 FREQUENT FALLS: ICD-10-CM

## 2017-12-19 RX ORDER — AMOXICILLIN 250 MG
1 CAPSULE ORAL DAILY
COMMUNITY
End: 2018-01-01 | Stop reason: SDUPTHER

## 2017-12-19 RX ORDER — CHROMIUM PICOLINATE 200 MCG
TABLET ORAL
COMMUNITY
End: 2018-01-01 | Stop reason: ALTCHOICE

## 2017-12-19 RX ORDER — TRAMADOL HYDROCHLORIDE 50 MG/1
50 TABLET ORAL
COMMUNITY
End: 2018-01-01 | Stop reason: SDUPTHER

## 2017-12-19 NOTE — MR AVS SNAPSHOT
Visit Information Date & Time Provider Department Dept. Phone Encounter #  
 12/19/2017  1:30 PM Jeni Frye, 3 [facility] [phone] [id number] Your Appointments 12/22/2017 11:00 AM  
Office Visit with Jeni Frye MD  
3 [facility] [address] [facility]) Appt Note: MWV/6 week f/u  
 [address] [facility] [address] [facility] [id number] [phone]  
  
   
 [address] [facility] [address] [facility] Upcoming Health Maintenance Date Due ZOSTER VACCINE AGE 60> 5/23/1998 GLAUCOMA SCREENING Q2Y 7/23/2003 Pneumococcal 65+ Low/Medium Risk (2 of 2 - PCV13) 11/14/2017 MEDICARE YEARLY EXAM 12/10/2017 DTaP/Tdap/Td series (2 - Td) 11/14/2026 Allergies as of 12/19/2017  Review Complete On: 12/19/2017 By: Jeni Frye MD  
  
 Severity Noted Reaction Type Reactions Codeine  10/20/2016    Itching Current Immunizations  Never Reviewed Name Date Influenza High Dose Vaccine PF 9/18/2017  1:45 PM  
 Pneumococcal Polysaccharide (PPSV-23) 11/14/2016 Not reviewed this visit You Were Diagnosed With   
  
 Codes Comments Traumatic compression fracture of L4 lumbar vertebra, closed, initial encounter ([facility]    -  Primary ICD-10-CM: A88.367Z ICD-9-CM: 805.4 Frequent falls     ICD-10-CM: R29.6 ICD-9-CM: V15.88 Constipation, unspecified constipation type     ICD-10-CM: K59.00 ICD-9-CM: 564.00 Laceration of right middle finger without foreign body without damage to nail, initial encounter     ICD-10-CM: T13.478I ICD-9-CM: 673. 0 Vitals BP Pulse Temp Resp Height(growth percentile) Weight(growth percentile) 128/60 (BP 1 Location: Left arm, BP Patient Position: Sitting) 73 97.8 °F (36.6 °C) (Oral) 16 5' 4\" (1.626 m) 129 lb (58.5 kg) SpO2 BMI OB Status Smoking Status 94% 22.14 kg/m2 Hysterectomy Former Smoker Vitals History BMI and BSA Data Body Mass Index Body Surface Area  
 22.14 kg/m 2 1.63 m 2 Preferred Pharmacy Pharmacy Name Phone [facility] Vaz[facility], [address] [phone] Your Updated Medication List  
  
   
This list is accurate as of: 12/19/17  2:40 PM.  Always use your most recent med list.  
  
  
  
  
 albuterol 90 mcg/actuation inhaler Commonly known as:  PROVENTIL HFA, VENTOLIN HFA, PROAIR HFA Take 2 Puffs by inhalation every four (4) hours as needed for Shortness of Breath (cough). CALCIUM 600 WITH VITAMIN D3 600 mg(1,500mg) -400 unit Cap Generic drug:  Calcium-Cholecalciferol (D3) Take  by mouth. 500 calcium 200 units of vitamin d  
  
 cholecalciferol (vitamin D3) 2,000 unit Tab Take  by mouth.  
  
 escitalopram oxalate 10 mg tablet Commonly known as:  Leahy Bias Take 1 Tab by mouth daily. At 7pm  
  
 mirabegron ER 25 mg ER tablet Commonly known as:  MYRBETRIQ Take 1 Tab by mouth daily. Indications: Bladder Hyperactivity  
  
 senna-docusate 8.6-50 mg per tablet Commonly known as:  Vertie Yuliya Take 1 Tab by mouth daily. As needed for constipation  
  
 traMADol 50 mg tablet Commonly known as:  ULTRAM  
Take 50 mg by mouth every six (6) hours as needed for Pain.  
  
 umeclidinium 62.5 mcg/actuation inhaler Commonly known as:  INCRUSE ELLIPTA Take 1 Puff by inhalation daily. Indications: PREVENTION OF BRONCHOSPASMS WITH EMPHYSEMA We Performed the Following REMOVAL OF SUTURES [ [facility]] Introducing hospitals & HEALTH SERVICES! Tristen Roach introduces [employer] patient portal. Now you can access parts of your medical record, email your doctor's office, and request medication refills online. 1. In your internet browser, go to [email or web address] 2. Click on the First Time User? Click Here link in the Sign In box. You will see the New Member Sign Up page. 3. Enter your [employer] Access Code exactly as it appears below. You will not need to use this code after youve completed the sign-up process. If you do not sign up before the expiration date, you must request a new code. · [employer] Access Code: [email or web address] Expires: 3/19/2018  2:40 PM 
 
4. Enter the last four digits of your Social Security Number (xxxx) and Date of Birth (mm/dd/yyyy) as indicated and click Submit. You will be taken to the next sign-up page. 5. Create a [employer] ID. This will be your [employer] login ID and cannot be changed, so think of one that is secure and easy to remember. 6. Create a [employer] password. You can change your password at any time. 7. Enter your Password Reset Question and Answer. This can be used at a later time if you forget your password. 8. Enter your e-mail address. You will receive e-mail notification when new information is available in [address] 9. Click Sign Up. You can now view and download portions of your medical record. 10. Click the Download Summary menu link to download a portable copy of your medical information. If you have questions, please visit the Frequently Asked Questions section of the [employer] website. Remember, [employer] is NOT to be used for urgent needs. For medical emergencies, dial 911. Now available from your iPhone and Android! Please provide this summary of care documentation to your next provider. Your primary care clinician is listed as Perla 13. If you have any questions after today's visit, please call [phone].

## 2017-12-19 NOTE — PROGRESS NOTES
Assessment/Plan:    1. Traumatic compression fracture of L4 lumbar vertebra, closed, initial encounter ([facility] [address]  -not having pain. No need for back brace at this time. Resume PT. Activity as tolerated. 2. Frequent falls  -on vitamin D. Getting PT. 3. Constipation, unspecified constipation type  -pt's daughter to admister lactulose qid prn until she has bm. The plan was discussed with the patient. The patient verbalized understanding and is in agreement with the plan. All medication potential side effects were discussed with the patient. Health Maintenance:   Health Maintenance   Topic Date Due    ZOSTER VACCINE AGE 60>  05/23/1998    GLAUCOMA SCREENING Q2Y  07/23/2003    Pneumococcal 65+ Low/Medium Risk (2 of 2 - PCV13) 11/14/2017    MEDICARE YEARLY EXAM  12/10/2017    DTaP/Tdap/Td series (2 - Td) 11/14/2026    OSTEOPOROSIS SCREENING (DEXA)  Completed    Influenza Age 5 to Adult  Addressed       Gatito Gottlieb is a 76 y.o. female and presents with Hospital Follow Up (for fall on 12.10.17 )     Subjective:  Pt had fall. Was seen in ER 12/11/17. Noted to have new lumbar L4 endplate depression (likely new compression fracture). She is already on vitamin D. She is doing PT at her nursing facility. The ER recommended a back brace. She doesn't feel she needs it. She doesn't have back pain. She states the back brace is uncomfortable. She is able to walk with walker without issue. She doesn't want to have assistance with showering as recommended by [facility]. She fell this time b/c she was bending forward to put on her shoes. She states she is able to bathe independently and make her own bed. Pt c/o constipation and bloating. Had done dulcolax and milk of magnesia. ROS:  Constitutional: No recent weight change. No weakness/fatigue. No f/c. Cardiovascular: No CP/palpitations. No ALY/orthopnea/PND. Respiratory: No cough/sputum, dyspnea, wheezing.    Gastointestinal: No dysphagia, reflux. No n/v.  + constipation/no diarrhea. No melena/rectal bleeding. Musculoskeletal: No joint pain/stiffness. No muscle pain/tenderness. The problem list was updated as a part of today's visit. 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    Paroxysmal atrial fibrillation (HCC) I48.0    Contraindication to anticoagulation therapy Z53.09    Frequent falls R29.6    Right hand weakness R29.898    Contracture, right hand M24.541    Pure hypercholesterolemia E78.00    Full code status Z78.9    Subclinical hypothyroidism E03.9    OAB (overactive bladder) N32.81    Dysthymia F34.1    Vitamin D deficiency E55.9    First degree AV block I44.0    Chronic cough R05    History of hemorrhagic stroke with residual hemiplegia (HCC) I69.359    History of vertebral compression fracture Z87.81    Centrilobular emphysema (HCC) J43.2       The PSH, FH were reviewed. SH:  Social History   Substance Use Topics    Smoking status: Former Smoker     Years: 10.00    Smokeless tobacco: Never Used    Alcohol use 0.6 oz/week     1 Glasses of wine per week       Medications/Allergies:  Current Outpatient Prescriptions on File Prior to Visit   Medication Sig Dispense Refill    diclofenac (VOLTAREN) 1 % gel Apply  to affected area four (4) times daily. 100 g 11    mirabegron ER (MYRBETRIQ) 25 mg ER tablet Take 1 Tab by mouth daily. Indications: Bladder Hyperactivity 90 Tab 0    umeclidinium (INCRUSE ELLIPTA) 62.5 mcg/actuation inhaler Take 1 Puff by inhalation daily. Indications: PREVENTION OF BRONCHOSPASMS WITH EMPHYSEMA 30 Inhaler 3    albuterol (PROVENTIL HFA, VENTOLIN HFA, PROAIR HFA) 90 mcg/actuation inhaler Take 2 Puffs by inhalation every four (4) hours as needed for Shortness of Breath (cough). 1 Inhaler 1    escitalopram oxalate (LEXAPRO) 10 mg tablet Take 1 Tab by mouth daily.  At 7pm 90 Tab 2    cholecalciferol, vitamin D3, 2,000 unit tab Take  by mouth. No current facility-administered medications on file prior to visit. Allergies   Allergen Reactions    Codeine Itching       Objective:  Visit Vitals    /60 (BP 1 Location: Left arm, BP Patient Position: Sitting)    Pulse 73    Temp 97.8 °F (36.6 °C) (Oral)    Resp 16    Ht 5' 4\" (1.626 m)    Wt 129 lb (58.5 kg)    SpO2 94%    BMI 22.14 kg/m2      Constitutional: Well developed, nourished, no distress, alert   CV: S1, S2.  RRR. No murmurs/rubs. No thrills palpated. No carotid bruits. Intact distal pulses. No edema. Pulm: No abnormalities on inspection. Clear to auscultation bilaterally. No wheezing/rhonchi. Normal effort. GI: Soft, nontender, nondistended. Normal active bowel sounds. MS: Gait slow with walker. +difficulty with get up and go test.    Neuro: Appropriate. Speech mildly dysarthric. Psych: Appropriate affect, judgement and insight. Short-term memory intact. Skin: R middle finger and 4th finger lacerations. Sutures of middle finger, removed.

## 2017-12-19 NOTE — TELEPHONE ENCOUNTER
The [facility] where the patient is living is requesting to have a D/C order for the Voltaren for the patient

## 2017-12-29 ENCOUNTER — TELEPHONE (OUTPATIENT)
Dept: FAMILY MEDICINE CLINIC | Age: 77
End: 2017-12-29

## 2018-01-01 ENCOUNTER — OFFICE VISIT (OUTPATIENT)
Dept: FAMILY MEDICINE CLINIC | Age: 78
End: 2018-01-01

## 2018-01-01 ENCOUNTER — TELEPHONE (OUTPATIENT)
Dept: FAMILY MEDICINE CLINIC | Age: 78
End: 2018-01-01

## 2018-01-01 ENCOUNTER — PATIENT OUTREACH (OUTPATIENT)
Dept: FAMILY MEDICINE CLINIC | Age: 78
End: 2018-01-01

## 2018-01-01 ENCOUNTER — DOCUMENTATION ONLY (OUTPATIENT)
Dept: FAMILY MEDICINE CLINIC | Age: 78
End: 2018-01-01

## 2018-01-01 ENCOUNTER — HOSPITAL ENCOUNTER (OUTPATIENT)
Dept: LAB | Age: 78
Discharge: HOME | End: 2018-04-03
Payer: MEDICARE

## 2018-01-01 ENCOUNTER — OFFICE VISIT (OUTPATIENT)
Dept: CARDIOLOGY CLINIC | Age: 78
End: 2018-01-01

## 2018-01-01 VITALS
DIASTOLIC BLOOD PRESSURE: 60 MMHG | OXYGEN SATURATION: 96 % | BODY MASS INDEX: 20.32 KG/M2 | HEIGHT: 64 IN | HEART RATE: 91 BPM | SYSTOLIC BLOOD PRESSURE: 120 MMHG | WEIGHT: 119 LBS | TEMPERATURE: 97.7 F | RESPIRATION RATE: 20 BRPM

## 2018-01-01 VITALS
BODY MASS INDEX: 20.32 KG/M2 | HEIGHT: 64 IN | TEMPERATURE: 97.8 F | DIASTOLIC BLOOD PRESSURE: 58 MMHG | RESPIRATION RATE: 18 BRPM | HEART RATE: 72 BPM | WEIGHT: 119 LBS | OXYGEN SATURATION: 97 % | SYSTOLIC BLOOD PRESSURE: 120 MMHG

## 2018-01-01 VITALS
TEMPERATURE: 97.9 F | SYSTOLIC BLOOD PRESSURE: 120 MMHG | HEART RATE: 86 BPM | RESPIRATION RATE: 20 BRPM | WEIGHT: 128.8 LBS | HEIGHT: 64 IN | DIASTOLIC BLOOD PRESSURE: 70 MMHG | BODY MASS INDEX: 21.99 KG/M2 | OXYGEN SATURATION: 98 %

## 2018-01-01 VITALS
TEMPERATURE: 97.5 F | DIASTOLIC BLOOD PRESSURE: 80 MMHG | SYSTOLIC BLOOD PRESSURE: 142 MMHG | HEART RATE: 60 BPM | OXYGEN SATURATION: 98 % | RESPIRATION RATE: 16 BRPM | HEIGHT: 64 IN | BODY MASS INDEX: 20.43 KG/M2

## 2018-01-01 VITALS
OXYGEN SATURATION: 96 % | HEIGHT: 64 IN | DIASTOLIC BLOOD PRESSURE: 78 MMHG | HEART RATE: 80 BPM | TEMPERATURE: 97.7 F | SYSTOLIC BLOOD PRESSURE: 144 MMHG | RESPIRATION RATE: 18 BRPM | BODY MASS INDEX: 21.68 KG/M2 | WEIGHT: 127 LBS

## 2018-01-01 VITALS
TEMPERATURE: 98.7 F | BODY MASS INDEX: 21.17 KG/M2 | RESPIRATION RATE: 20 BRPM | HEIGHT: 64 IN | HEART RATE: 86 BPM | DIASTOLIC BLOOD PRESSURE: 72 MMHG | SYSTOLIC BLOOD PRESSURE: 120 MMHG | WEIGHT: 124 LBS | OXYGEN SATURATION: 96 %

## 2018-01-01 VITALS
TEMPERATURE: 97.8 F | HEIGHT: 64 IN | RESPIRATION RATE: 18 BRPM | HEART RATE: 72 BPM | OXYGEN SATURATION: 99 % | BODY MASS INDEX: 20.32 KG/M2 | SYSTOLIC BLOOD PRESSURE: 120 MMHG | WEIGHT: 119 LBS | DIASTOLIC BLOOD PRESSURE: 62 MMHG

## 2018-01-01 VITALS
OXYGEN SATURATION: 97 % | BODY MASS INDEX: 21.17 KG/M2 | SYSTOLIC BLOOD PRESSURE: 118 MMHG | HEIGHT: 64 IN | WEIGHT: 124 LBS | TEMPERATURE: 97.8 F | RESPIRATION RATE: 20 BRPM | HEART RATE: 83 BPM | DIASTOLIC BLOOD PRESSURE: 80 MMHG

## 2018-01-01 VITALS
DIASTOLIC BLOOD PRESSURE: 70 MMHG | OXYGEN SATURATION: 95 % | WEIGHT: 127 LBS | TEMPERATURE: 97.7 F | HEART RATE: 85 BPM | RESPIRATION RATE: 20 BRPM | BODY MASS INDEX: 21.68 KG/M2 | HEIGHT: 64 IN | SYSTOLIC BLOOD PRESSURE: 130 MMHG

## 2018-01-01 VITALS
HEART RATE: 87 BPM | DIASTOLIC BLOOD PRESSURE: 60 MMHG | HEIGHT: 64 IN | TEMPERATURE: 97.8 F | RESPIRATION RATE: 20 BRPM | SYSTOLIC BLOOD PRESSURE: 100 MMHG | OXYGEN SATURATION: 99 %

## 2018-01-01 VITALS
SYSTOLIC BLOOD PRESSURE: 151 MMHG | BODY MASS INDEX: 21.68 KG/M2 | OXYGEN SATURATION: 96 % | HEIGHT: 64 IN | DIASTOLIC BLOOD PRESSURE: 81 MMHG | WEIGHT: 127 LBS | HEART RATE: 69 BPM

## 2018-01-01 VITALS
HEART RATE: 89 BPM | TEMPERATURE: 98.1 F | OXYGEN SATURATION: 99 % | HEIGHT: 64 IN | SYSTOLIC BLOOD PRESSURE: 120 MMHG | BODY MASS INDEX: 20.43 KG/M2 | RESPIRATION RATE: 20 BRPM | DIASTOLIC BLOOD PRESSURE: 70 MMHG

## 2018-01-01 DIAGNOSIS — I44.0 FIRST DEGREE AV BLOCK: Primary | ICD-10-CM

## 2018-01-01 DIAGNOSIS — M94.0 COSTOCHONDRITIS: ICD-10-CM

## 2018-01-01 DIAGNOSIS — M25.551 PAIN OF RIGHT HIP JOINT: ICD-10-CM

## 2018-01-01 DIAGNOSIS — F33.9 RECURRENT DEPRESSION (HCC): ICD-10-CM

## 2018-01-01 DIAGNOSIS — R29.6 RECURRENT FALLS: ICD-10-CM

## 2018-01-01 DIAGNOSIS — J18.9 PNEUMONIA OF BOTH LOWER LOBES DUE TO INFECTIOUS ORGANISM: ICD-10-CM

## 2018-01-01 DIAGNOSIS — M62.838 MUSCLE SPASM: ICD-10-CM

## 2018-01-01 DIAGNOSIS — G89.29 OTHER CHRONIC PAIN: ICD-10-CM

## 2018-01-01 DIAGNOSIS — H53.469 CVA, OLD, HOMONYMOUS HEMIANOPSIA: ICD-10-CM

## 2018-01-01 DIAGNOSIS — F05 SUNDOWNING: ICD-10-CM
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
## 2018-01-01 DIAGNOSIS — R82.998 LEUKOCYTES IN URINE: ICD-10-CM

## 2018-01-01 DIAGNOSIS — I69.398 CVA, OLD, HOMONYMOUS HEMIANOPSIA: ICD-10-CM

## 2018-01-01 DIAGNOSIS — S12.100A CLOSED ODONTOID FRACTURE, INITIAL ENCOUNTER (HCC): Primary | ICD-10-CM

## 2018-01-01 DIAGNOSIS — Z53.09 CONTRAINDICATION TO ANTICOAGULATION THERAPY: ICD-10-CM

## 2018-01-01 DIAGNOSIS — R29.6 FREQUENT FALLS: ICD-10-CM

## 2018-01-01 DIAGNOSIS — R40.0 SOMNOLENCE: ICD-10-CM

## 2018-01-01 DIAGNOSIS — R05.3 CHRONIC COUGH: ICD-10-CM

## 2018-01-01 DIAGNOSIS — J43.2 CENTRILOBULAR EMPHYSEMA (HCC): ICD-10-CM

## 2018-01-01 DIAGNOSIS — I69.359 HISTORY OF HEMORRHAGIC STROKE WITH RESIDUAL HEMIPLEGIA (HCC): ICD-10-CM

## 2018-01-01 DIAGNOSIS — R07.81 RIB PAIN ON RIGHT SIDE: ICD-10-CM

## 2018-01-01 DIAGNOSIS — F33.9 RECURRENT DEPRESSION (HCC): Primary | ICD-10-CM

## 2018-01-01 DIAGNOSIS — Z74.1 DEPENDENT FOR MOBILITY: ICD-10-CM

## 2018-01-01 DIAGNOSIS — M24.541 CONTRACTURE, RIGHT HAND: ICD-10-CM

## 2018-01-01 DIAGNOSIS — F03.90 DEMENTIA WITHOUT BEHAVIORAL DISTURBANCE, UNSPECIFIED DEMENTIA TYPE: ICD-10-CM

## 2018-01-01 DIAGNOSIS — G31.09 OTHER FRONTOTEMPORAL DEMENTIA WITH BEHAVIORAL DISTURBANCE: Primary | ICD-10-CM

## 2018-01-01 DIAGNOSIS — K59.00 CONSTIPATION, UNSPECIFIED CONSTIPATION TYPE: ICD-10-CM

## 2018-01-01 DIAGNOSIS — R41.0 CONFUSION: ICD-10-CM

## 2018-01-01 DIAGNOSIS — L89.621 DECUBITUS ULCER OF LEFT HEEL, STAGE 1: ICD-10-CM

## 2018-01-01 DIAGNOSIS — I69.322 CVA, OLD, DYSARTHRIA: ICD-10-CM

## 2018-01-01 DIAGNOSIS — M54.2 NECK PAIN: ICD-10-CM

## 2018-01-01 DIAGNOSIS — I48.0 PAROXYSMAL ATRIAL FIBRILLATION (HCC): ICD-10-CM

## 2018-01-01 DIAGNOSIS — R07.89 CHEST WALL PAIN: ICD-10-CM

## 2018-01-01 DIAGNOSIS — I48.0 PAROXYSMAL ATRIAL FIBRILLATION (HCC): Primary | ICD-10-CM

## 2018-01-01 DIAGNOSIS — F02.818 OTHER FRONTOTEMPORAL DEMENTIA WITH BEHAVIORAL DISTURBANCE: Primary | ICD-10-CM
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
## 2018-01-01 DIAGNOSIS — H57.12 PAIN OF LEFT EYE: ICD-10-CM

## 2018-01-01 DIAGNOSIS — F03.91 DEMENTIA WITH BEHAVIORAL DISTURBANCE, UNSPECIFIED DEMENTIA TYPE: Primary | ICD-10-CM

## 2018-01-01 DIAGNOSIS — R41.3 MEMORY IMPAIRMENT: ICD-10-CM

## 2018-01-01 DIAGNOSIS — R47.01 EXPRESSIVE APHASIA: ICD-10-CM

## 2018-01-01 DIAGNOSIS — R10.84 GENERALIZED ABDOMINAL PAIN: ICD-10-CM

## 2018-01-01 DIAGNOSIS — Z23 ENCOUNTER FOR IMMUNIZATION: ICD-10-CM

## 2018-01-01 DIAGNOSIS — Z74.09 IMPAIRED MOBILITY: Primary | ICD-10-CM

## 2018-01-01 DIAGNOSIS — R29.6 RECURRENT FALLS: Primary | ICD-10-CM

## 2018-01-01 DIAGNOSIS — Z00.00 MEDICARE ANNUAL WELLNESS VISIT, SUBSEQUENT: Primary | ICD-10-CM

## 2018-01-01 LAB
BACTERIA SPEC CULT: NORMAL
BILIRUB UR QL STRIP: NEGATIVE
GLUCOSE UR-MCNC: NEGATIVE MG/DL
KETONES P FAST UR STRIP-MCNC: NEGATIVE MG/DL
PH UR STRIP: 5.5 [PH] (ref 4.6–8)
PROT UR QL STRIP: NEGATIVE
SERVICE CMNT-IMP: NORMAL
SP GR UR STRIP: 1.02 (ref 1–1.03)
UA UROBILINOGEN AMB POC: NORMAL (ref 0.2–1)
URINALYSIS CLARITY POC: NORMAL
URINALYSIS COLOR POC: YELLOW
URINE BLOOD POC: NEGATIVE
URINE LEUKOCYTES POC: NORMAL
URINE NITRITES POC: NEGATIVE

## 2018-01-01 PROCEDURE — 87086 URINE CULTURE/COLONY COUNT: CPT | Performed by: INTERNAL MEDICINE

## 2018-01-01 RX ORDER — NALOXONE HYDROCHLORIDE 4 MG/.1ML
SPRAY NASAL
Qty: 1 EACH | Refills: 1 | Status: SHIPPED | OUTPATIENT
Start: 2018-01-01

## 2018-01-01 RX ORDER — VENLAFAXINE HYDROCHLORIDE 75 MG/1
75 CAPSULE, EXTENDED RELEASE ORAL DAILY
Qty: 90 CAP | Refills: 0 | Status: SHIPPED | OUTPATIENT
Start: 2018-01-01 | End: 2018-01-01 | Stop reason: ALTCHOICE

## 2018-01-01 RX ORDER — SERTRALINE HYDROCHLORIDE 50 MG/1
50 TABLET, FILM COATED ORAL EVERY EVENING
Qty: 90 TAB | Refills: 1 | Status: SHIPPED | OUTPATIENT
Start: 2018-01-01 | End: 2018-01-01 | Stop reason: SDUPTHER

## 2018-01-01 RX ORDER — ACETAMINOPHEN 500 MG
1000 TABLET ORAL
Qty: 90 TAB | Refills: 0 | Status: SHIPPED | OUTPATIENT
Start: 2018-01-01

## 2018-01-01 RX ORDER — ESCITALOPRAM OXALATE 20 MG/1
20 TABLET ORAL DAILY
Qty: 90 TAB | Refills: 1 | Status: SHIPPED | OUTPATIENT
Start: 2018-01-01 | End: 2018-01-01 | Stop reason: ALTCHOICE

## 2018-01-01 RX ORDER — ERGOCALCIFEROL 1.25 MG/1
50000 CAPSULE ORAL
Qty: 12 CAP | Refills: 0 | Status: SHIPPED | OUTPATIENT
Start: 2018-01-01 | End: 2018-01-01

## 2018-01-01 RX ORDER — QUETIAPINE FUMARATE 25 MG/1
25 TABLET, FILM COATED ORAL
Qty: 90 TAB | Refills: 0 | Status: SHIPPED | OUTPATIENT
Start: 2018-01-01 | End: 2018-01-01

## 2018-01-01 RX ORDER — MEMANTINE HYDROCHLORIDE 5 MG/1
TABLET ORAL
Qty: 180 TAB | Refills: 0 | Status: SHIPPED | OUTPATIENT
Start: 2019-01-01

## 2018-01-01 RX ORDER — VENLAFAXINE HYDROCHLORIDE 37.5 MG/1
37.5 CAPSULE, EXTENDED RELEASE ORAL DAILY
Qty: 90 CAP | Refills: 0 | Status: SHIPPED | OUTPATIENT
Start: 2018-01-01 | End: 2018-01-01 | Stop reason: SDUPTHER

## 2018-01-01 RX ORDER — TRAMADOL HYDROCHLORIDE 50 MG/1
TABLET ORAL
Qty: 30 TAB | Refills: 0 | Status: SHIPPED | OUTPATIENT
Start: 2018-01-01 | End: 2018-01-01 | Stop reason: ALTCHOICE

## 2018-01-01 RX ORDER — METOPROLOL TARTRATE 25 MG/1
12.5 TABLET, FILM COATED ORAL 2 TIMES DAILY
Qty: 90 TAB | Refills: 1 | Status: SHIPPED | OUTPATIENT
Start: 2018-01-01

## 2018-01-01 RX ORDER — FLUTICASONE PROPIONATE AND SALMETEROL 250; 50 UG/1; UG/1
1 POWDER RESPIRATORY (INHALATION) 2 TIMES DAILY
Qty: 3 INHALER | Refills: 3 | Status: SHIPPED | OUTPATIENT
Start: 2018-01-01 | End: 2018-01-01

## 2018-01-01 RX ORDER — FLUTICASONE PROPIONATE AND SALMETEROL 250; 50 UG/1; UG/1
1 POWDER RESPIRATORY (INHALATION) 2 TIMES DAILY
Qty: 3 INHALER | Refills: 3 | Status: SHIPPED | OUTPATIENT
Start: 2018-01-01 | End: 2018-01-01 | Stop reason: SDUPTHER

## 2018-01-01 RX ORDER — SERTRALINE HYDROCHLORIDE 100 MG/1
100 TABLET, FILM COATED ORAL EVERY EVENING
Qty: 90 TAB | Refills: 1 | Status: SHIPPED | OUTPATIENT
Start: 2018-01-01 | End: 2018-01-01 | Stop reason: SDUPTHER

## 2018-01-01 RX ORDER — CITALOPRAM 10 MG/1
TABLET ORAL
Qty: 90 TAB | Refills: 0 | Status: SHIPPED | OUTPATIENT
Start: 2018-01-01

## 2018-01-01 RX ORDER — METOPROLOL TARTRATE 25 MG/1
12.5 TABLET, FILM COATED ORAL 2 TIMES DAILY
COMMUNITY
End: 2018-01-01 | Stop reason: SDUPTHER

## 2018-01-01 RX ORDER — TRAMADOL HYDROCHLORIDE 50 MG/1
50 TABLET ORAL
Qty: 30 TAB | Refills: 0 | Status: SHIPPED | OUTPATIENT
Start: 2018-01-01 | End: 2018-01-01 | Stop reason: SDUPTHER

## 2018-01-01 RX ORDER — POLYETHYLENE GLYCOL 3350 17 G/17G
17 POWDER, FOR SOLUTION ORAL DAILY
Qty: 1750 G | Refills: 3 | Status: SHIPPED | OUTPATIENT
Start: 2018-01-01

## 2018-01-01 RX ORDER — LORAZEPAM 0.5 MG/1
0.5 TABLET ORAL
Qty: 30 TAB | Refills: 0 | Status: SHIPPED | OUTPATIENT
Start: 2018-01-01 | End: 2018-01-01 | Stop reason: SDUPTHER

## 2018-01-01 RX ORDER — LORAZEPAM 0.5 MG/1
0.5 TABLET ORAL
Qty: 90 TAB | Refills: 0 | Status: SHIPPED | OUTPATIENT
Start: 2018-01-01 | End: 2018-01-01 | Stop reason: ALTCHOICE

## 2018-01-01 RX ORDER — NITROFURANTOIN 25; 75 MG/1; MG/1
100 CAPSULE ORAL 2 TIMES DAILY
Qty: 14 CAP | Refills: 0 | Status: SHIPPED | OUTPATIENT
Start: 2018-01-01 | End: 2018-01-01

## 2018-01-01 RX ORDER — SERTRALINE HYDROCHLORIDE 100 MG/1
100 TABLET, FILM COATED ORAL EVERY EVENING
Qty: 90 TAB | Refills: 1 | Status: SHIPPED | OUTPATIENT
Start: 2018-01-01 | End: 2018-01-01 | Stop reason: ALTCHOICE

## 2018-01-01 RX ORDER — LORAZEPAM 0.5 MG/1
0.5 TABLET ORAL
Qty: 30 TAB | Refills: 3 | Status: SHIPPED | OUTPATIENT
Start: 2018-01-01

## 2018-01-01 RX ORDER — AMOXICILLIN 250 MG
1 CAPSULE ORAL DAILY
Qty: 90 TAB | Refills: 0 | Status: SHIPPED | OUTPATIENT
Start: 2018-01-01

## 2018-01-03 ENCOUNTER — OFFICE VISIT (OUTPATIENT)
Dept: FAMILY MEDICINE CLINIC | Age: 78
End: 2018-01-03

## 2018-01-03 ENCOUNTER — HOSPITAL ENCOUNTER (OUTPATIENT)
Dept: LAB | Age: 78
Discharge: HOME | End: 2018-01-03
Payer: MEDICARE

## 2018-01-03 VITALS
WEIGHT: 129 LBS | HEIGHT: 64 IN | HEART RATE: 86 BPM | DIASTOLIC BLOOD PRESSURE: 84 MMHG | SYSTOLIC BLOOD PRESSURE: 150 MMHG | TEMPERATURE: 98.3 F | RESPIRATION RATE: 18 BRPM | BODY MASS INDEX: 22.02 KG/M2 | OXYGEN SATURATION: 96 %

## 2018-01-03 DIAGNOSIS — R05.3 CHRONIC COUGH: ICD-10-CM

## 2018-01-03 DIAGNOSIS — R55 SYNCOPE AND COLLAPSE: Primary | ICD-10-CM

## 2018-01-03 DIAGNOSIS — R41.0 CONFUSION: ICD-10-CM

## 2018-01-03 DIAGNOSIS — Z86.79 HISTORY OF ATRIAL FIBRILLATION: ICD-10-CM

## 2018-01-03 DIAGNOSIS — I69.359 HISTORY OF HEMORRHAGIC STROKE WITH RESIDUAL HEMIPLEGIA (HCC): ICD-10-CM

## 2018-01-03 DIAGNOSIS — J43.2 CENTRILOBULAR EMPHYSEMA (HCC): ICD-10-CM

## 2018-01-03 DIAGNOSIS — S62.664A CLOSED NONDISPLACED FRACTURE OF DISTAL PHALANX OF RIGHT RING FINGER, INITIAL ENCOUNTER: ICD-10-CM

## 2018-01-03 PROBLEM — F33.9 RECURRENT DEPRESSION (HCC): Status: ACTIVE | Noted: 2018-01-03

## 2018-01-03 LAB
BILIRUB UR QL STRIP: NORMAL
GLUCOSE UR-MCNC: NEGATIVE MG/DL
KETONES P FAST UR STRIP-MCNC: NEGATIVE MG/DL
PH UR STRIP: 5.5 [PH] (ref 4.6–8)
PROT UR QL STRIP: NEGATIVE
SP GR UR STRIP: 1.03 (ref 1–1.03)
UA UROBILINOGEN AMB POC: NORMAL (ref 0.2–1)
URINALYSIS CLARITY POC: NORMAL
URINALYSIS COLOR POC: YELLOW
URINE BLOOD POC: NORMAL
URINE LEUKOCYTES POC: NEGATIVE
URINE NITRITES POC: NEGATIVE

## 2018-01-03 PROCEDURE — 87086 URINE CULTURE/COLONY COUNT: CPT | Performed by: INTERNAL MEDICINE

## 2018-01-03 RX ORDER — FLUTICASONE PROPIONATE AND SALMETEROL 250; 50 UG/1; UG/1
1 POWDER RESPIRATORY (INHALATION) 2 TIMES DAILY
Qty: 1 INHALER | Refills: 0 | Status: SHIPPED | OUTPATIENT
Start: 2018-01-03 | End: 2018-01-01 | Stop reason: SDUPTHER

## 2018-01-03 NOTE — MR AVS SNAPSHOT
Visit Information Date & Time Provider Department Dept. Phone Encounter #  
 1/3/2018  1:00 PM Jasmyne James, 3 [facility] [id number] Your Appointments 1/29/2018 11:15 AM  
Office Visit with Jasmyne James MD  
3 [facility] [address] [facility]) Appt Note: MWV/6 week f/u; ; r/s  
 [address] [facility] [address] [facility] [id number] [phone]  
  
   
 [address] [facility] [address] [facility] Upcoming Health Maintenance Date Due ZOSTER VACCINE AGE 60> 5/23/1998 GLAUCOMA SCREENING Q2Y 7/23/2003 Pneumococcal 65+ Low/Medium Risk (2 of 2 - PCV13) 11/14/2017 MEDICARE YEARLY EXAM 12/10/2017 DTaP/Tdap/Td series (2 - Td) 11/14/2026 Allergies as of 1/3/2018  Review Complete On: 1/3/2018 By: Deepti Chong, LPN Severity Noted Reaction Type Reactions Codeine  10/20/2016    Itching Current Immunizations  Never Reviewed Name Date Influenza High Dose Vaccine PF 9/18/2017  1:45 PM  
 Influenza Vaccine 11/11/2017 12:00 AM  
 Pneumococcal Polysaccharide (PPSV-23) 2/10/2017 12:00 AM, 11/14/2016 Tdap 12/11/2017 12:00 AM, 2/10/2017 12:00 AM  
  
 Not reviewed this visit You Were Diagnosed With   
  
 Codes Comments Syncope and collapse    -  Primary ICD-10-CM: R55 
ICD-9-CM: 780.2 History of hemorrhagic stroke with residual hemiplegia (HCC)     ICD-10-CM: C59.932 ICD-9-CM: 438.20 History of atrial fibrillation     ICD-10-CM: Z86.79 
ICD-9-CM: V12.59 Confusion     ICD-10-CM: R41.0 ICD-9-CM: 298.9 Closed nondisplaced fracture of distal phalanx of right ring finger, initial encounter     ICD-10-CM: B79.427Q ICD-9-CM: 816.02 Chronic cough     ICD-10-CM: R05 ICD-9-CM: 723. 2 Centrilobular emphysema ([address]     ICD-10-CM: J43.2 ICD-9-CM: 492.8 Vitals BP Pulse Temp Resp Height(growth percentile) Weight(growth percentile) 150/84 (BP 1 Location: Left arm, BP Patient Position: Sitting) 86 98.3 °F (36.8 °C) (Oral) 18 5' 4\" (1.626 m) 129 lb (58.5 kg) SpO2 BMI OB Status Smoking Status 96% 22.14 kg/m2 Hysterectomy Former Smoker Vitals History BMI and BSA Data Body Mass Index Body Surface Area  
 22.14 kg/m 2 1.63 m 2 Preferred Pharmacy Pharmacy Name Phone Taina Ferguson, Eliana Copeland [phone] Your Updated Medication List  
  
   
This list is accurate as of: 1/3/18  1:41 PM.  Always use your most recent med list.  
  
  
  
  
 albuterol 90 mcg/actuation inhaler Commonly known as:  PROVENTIL HFA, VENTOLIN HFA, PROAIR HFA Take 2 Puffs by inhalation every four (4) hours as needed for Shortness of Breath (cough). CALCIUM 600 WITH VITAMIN D3 600 mg(1,500mg) -400 unit Cap Generic drug:  Calcium-Cholecalciferol (D3) Take  by mouth. 500 calcium 200 units of vitamin d  
  
 cholecalciferol (vitamin D3) 2,000 unit Tab Take  by mouth.  
  
 escitalopram oxalate 10 mg tablet Commonly known as:  [place]ildy [place] Take 1 Tab by mouth daily. At 7pm  
  
 fluticasone-salmeterol 250-50 mcg/dose diskus inhaler Commonly known as:  ADVAIR Take 1 Puff by inhalation two (2) times a day. mirabegron ER 25 mg ER tablet Commonly known as:  MYRBETRIQ Take 1 Tab by mouth daily. Indications: Bladder Hyperactivity  
  
 senna-docusate 8.6-50 mg per tablet Commonly known as:  Chey [place] Take 1 Tab by mouth daily. As needed for constipation  
  
 traMADol 50 mg tablet Commonly known as:  ULTRAM  
Take 50 mg by mouth every six (6) hours as needed for Pain.  
  
 umeclidinium 62.5 mcg/actuation inhaler Commonly known as:  INCRUSE ELLIPTA Take 1 Puff by inhalation daily. Indications: PREVENTION OF BRONCHOSPASMS WITH EMPHYSEMA Prescriptions Sent to Pharmacy Refills fluticasone-salmeterol (ADVAIR) 250-50 mcg/dose diskus inhaler 0 Sig: Take 1 Puff by inhalation two (2) times a day. Class: Normal  
 Pharmacy: [id number] [facility] #: [phone] Route: Inhalation We Performed the Following AMB POC URINALYSIS DIP STICK AUTO W/O MICRO [80299 CPT(R)] REFERRAL TO CARDIOLOGY [AYF48 Custom] Comments:  
 Syncope. H/o PAF. Needs zio patch or holter REFERRAL TO NEUROLOGY [HUR58 Custom] Comments:  
 Please evaluate patient for syncope, h/o possible seizure activity in past, h/o hemorrhagic stroke, r/o seizure. Needs EEG Referral Information Referral ID Referred By Referred To  
  
 [id number] Rosa Flowers MD   
   [address] [place] [address] [address] Alycia Mcardle, 3 Albina Zarco Phone: [phone] Fax: [phone] Visits Status Start Date End Date 1 New Request 1/3/18 1/3/19 If your referral has a status of pending review or denied, additional information will be sent to support the outcome of this decision. Referral ID Referred By Referred To  
 [id number] [facility], Yash Chang MD  
   [facility] [address] [facility] [address] [facility] [address] Phone: [phone] Fax: [phone] Visits Status Start Date End Date 1 New Request 1/3/18 1/3/19 If your referral has a status of pending review or denied, additional information will be sent to support the outcome of this decision. Introducing [facility]! James Pimentel introduces [email or web address] patient portal. Now you can access parts of your medical record, email your doctor's office, and request medication refills online. 1. In your internet browser, go to [email or web address] 2. Click on the First Time User? Click Here link in the Sign In box. You will see the New Member Sign Up page. 3. Enter your Readbug Access Code exactly as it appears below. You will not need to use this code after youve completed the sign-up process. If you do not sign up before the expiration date, you must request a new code. · Readbug Access Code: [email or web address] Expires: 3/19/2018  2:40 PM 
 
4. Enter the last four digits of your Social Security Number (xxxx) and Date of Birth (mm/dd/yyyy) as indicated and click Submit. You will be taken to the next sign-up page. 5. Create a Readbug ID. This will be your Readbug login ID and cannot be changed, so think of one that is secure and easy to remember. 6. Create a Readbug password. You can change your password at any time. 7. Enter your Password Reset Question and Answer. This can be used at a later time if you forget your password. 8. Enter your e-mail address. You will receive e-mail notification when new information is available in [address] 9. Click Sign Up. You can now view and download portions of your medical record. 10. Click the Download Summary menu link to download a portable copy of your medical information. If you have questions, please visit the Frequently Asked Questions section of the Readbug website. Remember, Readbug is NOT to be used for urgent needs. For medical emergencies, dial 911. Now available from your iPhone and Android! Please provide this summary of care documentation to your next provider. Your primary care clinician is listed as Perla 13. If you have any questions after today's visit, please call [phone].

## 2018-01-03 NOTE — PROGRESS NOTES
Flor Montes is a 76 y.o. female is here for hospital follow up, she was taken to the hospital for a fall that caused her to lose consciousness. 1. Have you been to the ER, urgent care clinic since your last visit? Hospitalized since your last visit? [facility]     2. Have you seen or consulted any other health care providers outside of the [address] [place] [address] since your last visit? Include any pap smears or colon screening.  No     Health Maintenance Due   Topic Date Due    ZOSTER VACCINE AGE 60>  05/23/1998    GLAUCOMA SCREENING Q2Y  07/23/2003    Pneumococcal 65+ Low/Medium Risk (2 of 2 - PCV13) 11/14/2017    MEDICARE YEARLY EXAM  12/10/2017

## 2018-01-03 NOTE — PROGRESS NOTES
Assessment/Plan:    1. Syncope and collapse- ddx includes seizures vs arrhythmia (has h/o isolated episode of afib). Would like to refer to cards for zio patch/holter to eval for occult arrhythmia. I appreciate Dr. Naty Liz input.   - Munir Berumen [facility]    2. History of hemorrhagic stroke with residual hemiplegia ([address] with syncope and collapse- would like to refer to neuro for EEG to r/o seizure. Previously treated by Dr. Tonja Aragon empirically for seizures with topamax and keppra, but had side effects. She is reluctant to take meds unless necessary. Would like to r/o seizure as etiology of syncope/collapse.  - REFERRAL TO NEUROLOGY    3. Confusion  -likely concussion syndrome from fall. Monitor. U/a neg.  - AMB POC URINALYSIS DIP STICK AUTO W/O MICRO  - CULTURE, URINE; Future    4. Closed nondisplaced fracture of distal phalanx of right ring finger, initial encounter  -wear splint x 4 weeks. 5. Chronic cough and emphysema  -add advair to incruze. - fluticasone-salmeterol (ADVAIR) 250-50 mcg/dose diskus inhaler; Take 1 Puff by inhalation two (2) times a day. Dispense: 1 Inhaler; Refill: 0    The plan was discussed with the patient. The patient verbalized understanding and is in agreement with the plan. All medication potential side effects were discussed with the patient. Health Maintenance:   Health Maintenance   Topic Date Due    ZOSTER VACCINE AGE 60>  05/23/1998    GLAUCOMA SCREENING Q2Y  07/23/2003    Pneumococcal 65+ Low/Medium Risk (2 of 2 - PCV13) 11/14/2017    MEDICARE YEARLY EXAM  12/10/2017    DTaP/Tdap/Td series (2 - Td) 11/14/2026    OSTEOPOROSIS SCREENING (DEXA)  Completed    Influenza Age 5 to Adult  Addressed       Jonas Begum is a 76 y.o. female and presents with Hospital Follow Up     Subjective:  Pt continues to have several falls. Most recently, she fell 12/29. She is unsure of how she fell.   States she was sitting on the couch watching tv and that's all she remembers. She was found on the floor. Was seen 12/29 in ER - CT head was unchanged. CT cervical spine showed nothing acute. R 4th finger distal phalanx fracture. She didn't have bowel/bladder incontinence or tongue biting. Pt notes choking episodes when eating. MBS showed only mild oropharyngeal dysphagia - reg diet recommended. She also has chronic cough and choking, with emphysema on imaging. Is on     ROS:  Constitutional: No recent weight change. No weakness/fatigue. No f/c. Skin: No rashes, change in nails/hair, itching   HENT: No HA, dizziness. No hearing loss/tinnitus. No nasal congestion/discharge. Eyes: No change in vision, double/blurred vision or eye pain/redness. Cardiovascular: No CP/palpitations. No ALY/orthopnea/PND. Respiratory: + cough/+sputum, no dyspnea, no wheezing. Gastointestinal: No dysphagia, reflux. No n/v. No constipation/diarrhea. No melena/rectal bleeding. Genitourinary: No dysuria, urinary hesitancy, nocturia, hematuria. No incontinence. Musculoskeletal: No joint pain/stiffness. No muscle pain/tenderness. Endo: No heat/cold intolerance, no polyuria/polydypsia. Heme: No h/o anemia. No easy bleeding/bruising. Allergy/Immunology: No seasonal rhinitis. Denies frequent colds, sinus/ear infections. Neurological: No seizures/numbness/weakness. No paresthesias. Psychiatric:  No depression, anxiety. The problem list was updated as a part of today's visit.   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    Paroxysmal atrial fibrillation (HCC) I48.0    Contraindication to anticoagulation therapy Z53.09    Frequent falls R29.6    Right hand weakness R29.898    Contracture, right hand M24.541    Pure hypercholesterolemia E78.00    Full code status Z78.9    Subclinical hypothyroidism E03.9    OAB (overactive bladder) N32.81    Dysthymia F34.1    Vitamin D deficiency E55.9    First degree AV block I44.0    Chronic cough R05    History of hemorrhagic stroke with residual hemiplegia ([facility]) I69.359    History of vertebral compression fracture Z87.81    Centrilobular emphysema ([facility]) J43.2    Recurrent depression ([facility]) F33.9       The PSH, FH were reviewed. SH:  Social History   Substance Use Topics    Smoking status: Former Smoker     Years: 10.00    Smokeless tobacco: Never Used    Alcohol use 0.6 oz/week     1 Glasses of wine per week       Medications/Allergies:  Current Outpatient Prescriptions on File Prior to Visit   Medication Sig Dispense Refill    traMADol (ULTRAM) 50 mg tablet Take 50 mg by mouth every six (6) hours as needed for Pain.  Calcium-Cholecalciferol, D3, (CALCIUM 600 WITH VITAMIN D3) 600 mg(1,500mg) -400 unit cap Take  by mouth. 500 calcium 200 units of vitamin d      senna-docusate (PERICOLACE) 8.6-50 mg per tablet Take 1 Tab by mouth daily. As needed for constipation      mirabegron ER (MYRBETRIQ) 25 mg ER tablet Take 1 Tab by mouth daily. Indications: Bladder Hyperactivity 90 Tab 0    umeclidinium (INCRUSE ELLIPTA) 62.5 mcg/actuation inhaler Take 1 Puff by inhalation daily. Indications: PREVENTION OF BRONCHOSPASMS WITH EMPHYSEMA 30 Inhaler 3    albuterol (PROVENTIL HFA, VENTOLIN HFA, PROAIR HFA) 90 mcg/actuation inhaler Take 2 Puffs by inhalation every four (4) hours as needed for Shortness of Breath (cough). 1 Inhaler 1    escitalopram oxalate (LEXAPRO) 10 mg tablet Take 1 Tab by mouth daily. At 7pm 90 Tab 2    cholecalciferol, vitamin D3, 2,000 unit tab Take  by mouth. No current facility-administered medications on file prior to visit.          Allergies   Allergen Reactions    Codeine Itching       Objective:  Visit Vitals    /84 (BP 1 Location: Left arm, BP Patient Position: Sitting)    Pulse 86    Temp 98.3 °F (36.8 °C) (Oral)    Resp 18    Ht 5' 4\" (1.626 m)    Wt 129 lb (58.5 kg)    SpO2 96%    BMI 22.14 kg/m2      Constitutional: Well developed, nourished, no distress, alert   CV: S1, S2.  RRR. No murmurs/rubs. No thrills palpated. No carotid bruits. Intact distal pulses. No edema. Pulm: No abnormalities on inspection. Clear to auscultation bilaterally. No wheezing/rhonchi. Normal effort. MS: Swelling over dorsum of R hand. No pain over 3rd and 4th metacarpals. R 4th finger in splint. Neuro: R hemiparesis. Speech dysarthric. Skin: Ecchymoses over R hand, R temporal region   Psych: Appropriate affect, judgement and insight. Short-term memory intact.

## 2018-01-05 LAB
BACTERIA SPEC CULT: NORMAL
SERVICE CMNT-IMP: NORMAL

## 2018-01-10 ENCOUNTER — TELEPHONE (OUTPATIENT)
Dept: FAMILY MEDICINE CLINIC | Age: 78
End: 2018-01-10

## 2018-01-10 NOTE — TELEPHONE ENCOUNTER
Pt's daughter called because the pt was suppose to start advair but her Assisted Living needs us to fax over the instructions for her to start the medication. She did not have the phone or fax number she stated that the nurses should already have that information.

## 2018-01-11 ENCOUNTER — TELEPHONE (OUTPATIENT)
Dept: CARDIOLOGY CLINIC | Age: 78
End: 2018-01-11

## 2018-01-19 NOTE — MR AVS SNAPSHOT
[address] [facility] [address] [address] 
[phone] Patient: Marry Fang MRN: [id number] UED:[id number] Visit Information Date & Time Provider Department Dept. Phone Encounter #  
 1/19/2018  2:15 PM Wei Ham MD [address] [place] [address] Specialist at [facility] [phone] Follow-up Instructions Return in about 6 months (around 7/19/2018). Your Appointments 1/29/2018 11:15 AM  
Office Visit with Carlita Eddy MD  
[address] [place] [address] [facility]) Appt Note: MWV/6 week f/u; ; r/s  
 [address] [facility] [address] [place] [phone]  
[phone]  
  
   
 [address] 7/23/2018  1:30 PM  
Follow Up with Wei Ham MD  
Cardio Specialist at [facility]) Appt Note: 6 months [facility] [address] Upcoming Health Maintenance Date Due ZOSTER VACCINE AGE 60> 5/23/1998 GLAUCOMA SCREENING Q2Y 7/23/2003 MEDICARE YEARLY EXAM 12/10/2017 Pneumococcal 65+ Low/Medium Risk (2 of 2 - PCV13) 2/10/2018 DTaP/Tdap/Td series (2 - Td) 12/11/2027 Allergies as of 1/19/2018  Review Complete On: 1/19/2018 By: Christiano Webster Severity Noted Reaction Type Reactions Codeine  10/20/2016    Itching Current Immunizations  Never Reviewed Name Date Influenza High Dose Vaccine PF 9/18/2017  1:45 PM  
 Influenza Vaccine 11/11/2017 12:00 AM  
 Pneumococcal Polysaccharide (PPSV-23) 2/10/2017 12:00 AM, 11/14/2016 Tdap 12/11/2017 12:00 AM, 2/10/2017 12:00 AM  
  
 Not reviewed this visit Vitals BP Pulse Height(growth percentile) Weight(growth percentile) SpO2 BMI  
 151/81 69 5' 4\" (1.626 m) 127 lb (57.6 kg) 96% 21.8 kg/m2 OB Status Smoking Status Hysterectomy Former Smoker Vitals History BMI and BSA Data Body Mass Index Body Surface Area  
 21.8 kg/m 2 1.61 m 2 Preferred Pharmacy Pharmacy Name Phone [facility], Eliana Copeland [phone] Your Updated Medication List  
  
   
This list is accurate as of: 1/19/18  2:31 PM.  Always use your most recent med list.  
  
  
  
  
 albuterol 90 mcg/actuation inhaler Commonly known as:  PROVENTIL HFA, VENTOLIN HFA, PROAIR HFA Take 2 Puffs by inhalation every four (4) hours as needed for Shortness of Breath (cough). CALCIUM 600 WITH VITAMIN D3 600 mg(1,500mg) -400 unit Cap Generic drug:  Calcium-Cholecalciferol (D3) Take  by mouth. 500 calcium 200 units of vitamin d  
  
 cholecalciferol (vitamin D3) 2,000 unit Tab Take  by mouth.  
  
 escitalopram oxalate 10 mg tablet Commonly known as:  [place] Take 1 Tab by mouth daily. At 7pm  
  
 fluticasone-salmeterol 250-50 mcg/dose diskus inhaler Commonly known as:  ADVAIR Take 1 Puff by inhalation two (2) times a day. mirabegron ER 25 mg ER tablet Commonly known as:  MYRBETRIQ Take 1 Tab by mouth daily. Indications: Bladder Hyperactivity  
  
 senna-docusate 8.6-50 mg per tablet Commonly known as:  [place] Take 1 Tab by mouth daily. As needed for constipation  
  
 traMADol 50 mg tablet Commonly known as:  ULTRAM  
Take 50 mg by mouth every six (6) hours as needed for Pain.  
  
 umeclidinium 62.5 mcg/actuation inhaler Commonly known as:  INCRUSE ELLIPTA Take 1 Puff by inhalation daily. Indications: PREVENTION OF BRONCHOSPASMS WITH EMPHYSEMA Follow-up Instructions Return in about 6 months (around 7/19/2018). Introducing [facility]! [facility] [employer] introduces Sling[employer] patient portal. Now you can access parts of your medical record, email your doctor's office, and request medication refills online. 1. In your internet browser, go to [email or web address] 2. Click on the First Time User? Click Here link in the Sign In box. You will see the New Member Sign Up page. 3. Enter your [employer] Access Code exactly as it appears below. You will not need to use this code after youve completed the sign-up process. If you do not sign up before the expiration date, you must request a new code. · [employer] Access Code: [email or web address] Expires: 3/19/2018  2:40 PM 
 
4. Enter the last four digits of your Social Security Number (xxxx) and Date of Birth (mm/dd/yyyy) as indicated and click Submit. You will be taken to the next sign-up page. 5. Create a Competitort ID. This will be your [employer] login ID and cannot be changed, so think of one that is secure and easy to remember. 6. Create a [employer] password. You can change your password at any time. 7. Enter your Password Reset Question and Answer. This can be used at a later time if you forget your password. 8. Enter your e-mail address. You will receive e-mail notification when new information is available in [address] 9. Click Sign Up. You can now view and download portions of your medical record. 10. Click the Download Summary menu link to download a portable copy of your medical information. If you have questions, please visit the Frequently Asked Questions section of the [employer] website. Remember, [employer] is NOT to be used for urgent needs. For medical emergencies, dial 911. Now available from your iPhone and Android! Please provide this summary of care documentation to your next provider. Your primary care clinician is listed as Perla 13. If you have any questions after today's visit, please call [phone].

## 2018-01-19 NOTE — PROGRESS NOTES
1. Have you been to the ER, urgent care clinic since your last visit? Hospitalized since your last visit? Yes, Frantz     2. Have you seen or consulted any other health care providers outside of the [address] [place] [address] since your last visit? Include any pap smears or colon screening.  No

## 2018-01-24 NOTE — PROGRESS NOTES
Subjective:   Balta Person is in the office today for cardiac evaluation. She is a 76 y.o. woman that is a former resident from the [facility] that has moved to the [facility] to be close to her daughter. She is presently living in an assisted living capacity at [facility]. . In May 2016 months after starting on anticoagulation for a stroke, she was diagnosed with a hemorrhagic stroke. At some point, she was felt to have had some brief episodes of atrial fibrillation. In that regard, the patient has had no recent palpitations. She maintains a fair level of physical activity and does her own physical therapy routine on a frequent basis. She has had no chest pain. She has occasional shortness of breath which is not well characterized. She denies any peripheral swelling. The patient was felt to be a high risk for anticoagulation. She maintains she is still having some occasional falls which are documented in the record. Patient's cardiac risk factors are family history, dyslipidemia.         Patient Active Problem List    Diagnosis Date Noted    Expressive aphasia 01/29/2018    Recurrent depression ([address] 01/03/2018    History of hemorrhagic stroke with residual hemiplegia ([address] 09/18/2017    History of vertebral compression fracture 09/18/2017    Centrilobular emphysema ([address] 09/18/2017    Chronic cough 08/14/2017    Vitamin D deficiency 06/05/2017    First degree AV block 06/05/2017    Dysthymia 01/27/2017    Subclinical hypothyroidism 12/30/2016    OAB (overactive bladder) 12/30/2016    Full code status 12/09/2016    Pure hypercholesterolemia 12/05/2016    CVA, old, dysarthria 11/14/2016    CVA, old, homonymous hemianopsia 11/14/2016    Paroxysmal atrial fibrillation ([address] 11/14/2016    Contraindication to anticoagulation therapy 11/14/2016    Frequent falls 11/14/2016    Right hand weakness 11/14/2016    Contracture, right hand 11/14/2016     Current Outpatient Prescriptions   Medication Sig Dispense Refill    traMADol (ULTRAM) 50 mg tablet Take 50 mg by mouth every six (6) hours as needed for Pain.  Calcium-Cholecalciferol, D3, (CALCIUM 600 WITH VITAMIN D3) 600 mg(1,500mg) -400 unit cap Take  by mouth. 500 calcium 200 units of vitamin d      senna-docusate (PERICOLACE) 8.6-50 mg per tablet Take 1 Tab by mouth daily. As needed for constipation      mirabegron ER (MYRBETRIQ) 25 mg ER tablet Take 1 Tab by mouth daily. Indications: Bladder Hyperactivity 90 Tab 0    umeclidinium (INCRUSE ELLIPTA) 62.5 mcg/actuation inhaler Take 1 Puff by inhalation daily. Indications: PREVENTION OF BRONCHOSPASMS WITH EMPHYSEMA 30 Inhaler 3    albuterol (PROVENTIL HFA, VENTOLIN HFA, PROAIR HFA) 90 mcg/actuation inhaler Take 2 Puffs by inhalation every four (4) hours as needed for Shortness of Breath (cough). 1 Inhaler 1    escitalopram oxalate (LEXAPRO) 10 mg tablet Take 1 Tab by mouth daily. At 7pm 90 Tab 2    cholecalciferol, vitamin D3, 2,000 unit tab Take  by mouth.  fluticasone-salmeterol (ADVAIR) 250-50 mcg/dose diskus inhaler Take 1 Puff by inhalation two (2) times a day.  3 Inhaler 3     Allergies   Allergen Reactions    Codeine Itching     Past Medical History:   Diagnosis Date    A-fib ([address]     Anxiety     Atrial fibrillation (HCC)     Depression     Hypertension     Stroke [facility])      Past Surgical History:   Procedure Laterality Date    HX CRANIOTOMY      HX HIP REPLACEMENT      HX HYSTERECTOMY      HX ORTHOPAEDIC      femur      Family History   Problem Relation Age of Onset    High Cholesterol Mother     Alcohol abuse Neg Hx      History   Smoking Status    Former Smoker    Years: 10.00   Smokeless Tobacco    Never Used          Review of Systems, additional:  Constitutional: negative  Eyes: negative  Respiratory: negative  Cardiovascular: positive for dyspnea  Gastrointestinal: negative  Musculoskeletal:negative  Neurological: syncope, falls, CVA residual  Behvioral/Psych: negative  Endocrine: negative  ENT: negative    Objective:     Visit Vitals    /81    Pulse 69    Ht 5' 4\" (1.626 m)    Wt 127 lb (57.6 kg)    SpO2 96%    BMI 21.8 kg/m2     General:  alert, cooperative, no distress   Chest Wall: inspection normal - no chest wall deformities or tenderness, respiratory effort normal   Lung: clear to auscultation bilaterally   Heart:  normal rate and regular rhythm, S1 and S2 normal, no murmurs noted, no gallops noted   Abdomen: soft, non-tender. Bowel sounds normal. No masses,  no organomegaly   Extremities: extremities normal, atraumatic, no cyanosis or edema Skin: no rashes   Neuro: alert, oriented, mildly dysarthric     EK2017; Sinus rhythm. 1st degree AV block. Assessment/Plan:       ICD-10-CM ICD-9-CM    1. First degree AV block I44.0 426.11    2. Paroxysmal atrial fibrillation ([address] requested holter monitor but patient refuses at this point. She agrees to follow up in 6 mos. I48.0 427.31    3. Centrilobular emphysema (HCC) J43.2 492.8    4. CVA, old, homonymous hemianopsia I69.398 438.7     H53.469 368.46    5. CVA, old, dysarthria I69.322 438.13    6. Contraindication to anticoagulation therapy, secondary to #7 Z53.09 V64.1    7.  History of hemorrhagic stroke with residual hemiplegia (HCC) I69.359 438.20

## 2018-01-29 PROBLEM — R47.01 EXPRESSIVE APHASIA: Status: ACTIVE | Noted: 2018-01-01

## 2018-01-29 NOTE — PROGRESS NOTES
Jose Luis May is a 76 y.o. female (: 1940) presenting to address:    Chief Complaint   Patient presents with    Annual Wellness Visit       Vitals:    18 1117   BP: 120/70   Pulse: 86   Resp: 20   Temp: 97.9 °F (36.6 °C)   TempSrc: Oral   SpO2: 98%   Weight: 128 lb 12.8 oz (58.4 kg)   Height: 5' 4\" (1.626 m)   PainSc:   0 - No pain       Hearing/Vision:      Visual Acuity Screening    Right eye Left eye Both eyes   Without correction: 20/50 20/00 20/40-2   With correction:          Learning Assessment:     Learning Assessment 10/20/2016   PRIMARY LEARNER Patient   PRIMARY LANGUAGE ENGLISH   LEARNER PREFERENCE PRIMARY DEMONSTRATION   ANSWERED BY patient   RELATIONSHIP SELF     Depression Screening:     PHQ over the last two weeks 2018   Little interest or pleasure in doing things Not at all   Feeling down, depressed or hopeless Not at all   Total Score PHQ 2 0     Fall Risk Assessment:     Fall Risk Assessment, last 12 mths 2018   Able to walk? No   Fall in past 12 months? -   Fall with injury? -   Number of falls in past 12 months -   Fall Risk Score -     Abuse Screening:     Abuse Screening Questionnaire 2017   Do you ever feel afraid of your partner? N   Are you in a relationship with someone who physically or mentally threatens you? N   Is it safe for you to go home? Y     Coordination of Care Questionaire:   1. Have you been to the ER, urgent care clinic since your last visit? Hospitalized since your last visit? NO    2. Have you seen or consulted any other health care providers outside of the [address] [place] [address] since your last visit? Include any pap smears or colon screening. NO    Advanced Directive:   1. Do you have an Advanced Directive? YES    2. Would you like information on Advanced Directives?  NO

## 2018-01-29 NOTE — PATIENT INSTRUCTIONS

## 2018-01-29 NOTE — PROGRESS NOTES
Assessment/Plan:    1. Medicare annual wellness visit, subsequent    2. Pain of left eye and CVA, old, homonymous hemianopsia  - REFERRAL TO OPHTHALMOLOGY    3. Expressive aphasia  -stable      The plan was discussed with the patient. The patient verbalized understanding and is in agreement with the plan. All medication potential side effects were discussed with the patient. Health Maintenance:   Health Maintenance   Topic Date Due    GLAUCOMA SCREENING Q2Y  07/23/2003    Pneumococcal 65+ Low/Medium Risk (2 of 2 - PCV13) 02/10/2018    MEDICARE YEARLY EXAM  01/30/2019    DTaP/Tdap/Td series (2 - Td) 12/11/2027    OSTEOPOROSIS SCREENING (DEXA)  Completed    ZOSTER VACCINE AGE 60>  Addressed    Influenza Age 5 to Adult  Addressed       Cathleen Knight is a 76 y.o. female and presents with Annual Wellness Visit     Subjective:  Pt c/o L eye pain. Has been using systane which doesn't seem to help. Pt continues to have choking episodes. Swallow study previously nml. I had referred to cards for r/o occult arrhythmia, however pt declined holter. Has appt with Dr. Aquilino Franklin upcoming/neuro to r/o seizure. She is concerned about her memory b/c of her speech/expressive aphasia. ROS:  Constitutional: No recent weight change. No weakness/fatigue. No f/c. Cardiovascular: No CP/palpitations. No ALY/orthopnea/PND. Respiratory: No cough/sputum, dyspnea, wheezing. Gastointestinal: No dysphagia, reflux. No n/v. No constipation/diarrhea. No melena/rectal bleeding. Neurological: No seizures/numbness/weakness. No paresthesias. The problem list was updated as a part of today's visit.   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    Paroxysmal atrial fibrillation (HCC) I48.0    Contraindication to anticoagulation therapy Z53.09    Frequent falls R29.6    Right hand weakness R29.898    Contracture, right hand M24.541    Pure hypercholesterolemia E78.00    Full code status Z78.9    Subclinical hypothyroidism E03.9    OAB (overactive bladder) N32.81    Dysthymia F34.1    Vitamin D deficiency E55.9    First degree AV block I44.0    Chronic cough R05    History of hemorrhagic stroke with residual hemiplegia (HCC) I69.359    History of vertebral compression fracture Z87.81    Centrilobular emphysema (HCC) J43.2    Recurrent depression (HCC) F33.9       The PSH, FH were reviewed. SH:  Social History   Substance Use Topics    Smoking status: Former Smoker     Years: 10.00    Smokeless tobacco: Never Used    Alcohol use 0.6 oz/week     1 Glasses of wine per week       Medications/Allergies:  Current Outpatient Prescriptions on File Prior to Visit   Medication Sig Dispense Refill    fluticasone-salmeterol (ADVAIR) 250-50 mcg/dose diskus inhaler Take 1 Puff by inhalation two (2) times a day. 1 Inhaler 0    traMADol (ULTRAM) 50 mg tablet Take 50 mg by mouth every six (6) hours as needed for Pain.  Calcium-Cholecalciferol, D3, (CALCIUM 600 WITH VITAMIN D3) 600 mg(1,500mg) -400 unit cap Take  by mouth. 500 calcium 200 units of vitamin d      senna-docusate (PERICOLACE) 8.6-50 mg per tablet Take 1 Tab by mouth daily. As needed for constipation      mirabegron ER (MYRBETRIQ) 25 mg ER tablet Take 1 Tab by mouth daily. Indications: Bladder Hyperactivity 90 Tab 0    umeclidinium (INCRUSE ELLIPTA) 62.5 mcg/actuation inhaler Take 1 Puff by inhalation daily. Indications: PREVENTION OF BRONCHOSPASMS WITH EMPHYSEMA 30 Inhaler 3    albuterol (PROVENTIL HFA, VENTOLIN HFA, PROAIR HFA) 90 mcg/actuation inhaler Take 2 Puffs by inhalation every four (4) hours as needed for Shortness of Breath (cough). 1 Inhaler 1    escitalopram oxalate (LEXAPRO) 10 mg tablet Take 1 Tab by mouth daily. At 7pm 90 Tab 2    cholecalciferol, vitamin D3, 2,000 unit tab Take  by mouth.        No current facility-administered medications on file prior to visit. Allergies   Allergen Reactions    Codeine Itching       Objective:  Visit Vitals    /70 (BP 1 Location: Left arm, BP Patient Position: Sitting)    Pulse 86    Temp 97.9 °F (36.6 °C) (Oral)    Resp 20    Ht 5' 4\" (1.626 m)    Wt 128 lb 12.8 oz (58.4 kg)    SpO2 98%    BMI 22.11 kg/m2      Constitutional: Well developed, nourished, no distress, alert   CV: S1, S2.  irreg irreg. No murmurs/rubs. No thrills palpated. No carotid bruits. Intact distal pulses. No edema. Pulm: No abnormalities on inspection. Clear to auscultation bilaterally. No wheezing/rhonchi. Normal effort. GI: Soft, nontender, nondistended. Normal active bowel sounds. Neuro: Oriented x 1. Speech dysarthri. Psych: Appropriate affect, judgement and insight. Short-term memory intact. This is a Subsequent Medicare Annual Wellness Exam (AWV) (Performed 12 months after IPPE or effective date of Medicare Part B enrollment)    I have reviewed the patient's medical history in detail and updated the computerized patient record. History     Past Medical History:   Diagnosis Date    [facility]     Anxiety     Atrial fibrillation (HCC)     Depression     Hypertension     Stroke ([facility]       Past Surgical History:   Procedure Laterality Date    HX CRANIOTOMY      HX HIP REPLACEMENT      HX HYSTERECTOMY      HX ORTHOPAEDIC      femur      Current Outpatient Prescriptions   Medication Sig Dispense Refill    fluticasone-salmeterol (ADVAIR) 250-50 mcg/dose diskus inhaler Take 1 Puff by inhalation two (2) times a day. 1 Inhaler 0    traMADol (ULTRAM) 50 mg tablet Take 50 mg by mouth every six (6) hours as needed for Pain.  Calcium-Cholecalciferol, D3, (CALCIUM 600 WITH VITAMIN D3) 600 mg(1,500mg) -400 unit cap Take  by mouth. 500 calcium 200 units of vitamin d      senna-docusate (PERICOLACE) 8.6-50 mg per tablet Take 1 Tab by mouth daily.  As needed for constipation      mirabegron ER (MYRBETRIQ) 25 mg ER tablet Take 1 Tab by mouth daily. Indications: Bladder Hyperactivity 90 Tab 0    umeclidinium (INCRUSE ELLIPTA) 62.5 mcg/actuation inhaler Take 1 Puff by inhalation daily. Indications: PREVENTION OF BRONCHOSPASMS WITH EMPHYSEMA 30 Inhaler 3    albuterol (PROVENTIL HFA, VENTOLIN HFA, PROAIR HFA) 90 mcg/actuation inhaler Take 2 Puffs by inhalation every four (4) hours as needed for Shortness of Breath (cough). 1 Inhaler 1    escitalopram oxalate (LEXAPRO) 10 mg tablet Take 1 Tab by mouth daily. At 7pm 90 Tab 2    cholecalciferol, vitamin D3, 2,000 unit tab Take  by mouth.        Allergies   Allergen Reactions    Codeine Itching     Family History   Problem Relation Age of Onset    High Cholesterol Mother      Social History   Substance Use Topics    Smoking status: Former Smoker     Years: 10.00    Smokeless tobacco: Never Used    Alcohol use 0.6 oz/week     1 Glasses of wine per week     Patient Active Problem List   Diagnosis Code    CVA, old, dysarthria I69.80    CVA, old, homonymous hemianopsia I69.398, H53.469    Paroxysmal atrial fibrillation (HCC) I48.0    Contraindication to anticoagulation therapy Z53.09    Frequent falls R29.6    Right hand weakness R29.898    Contracture, right hand M24.541    Pure hypercholesterolemia E78.00    Full code status Z78.9    Subclinical hypothyroidism E03.9    OAB (overactive bladder) N32.81    Dysthymia F34.1    Vitamin D deficiency E55.9    First degree AV block I44.0    Chronic cough R05    History of hemorrhagic stroke with residual hemiplegia (HCC) I69.359    History of vertebral compression fracture Z87.81    Centrilobular emphysema ([facility]) J43.2    Recurrent depression ([facility]) F33.9       Depression Risk Factor Screening:     PHQ over the last two weeks 1/29/2018   Little interest or pleasure in doing things Not at all   Feeling down, depressed or hopeless Not at all   Total Score PHQ 2 0     Alcohol Risk Factor Screening: You do not drink alcohol or very rarely. Functional Ability and Level of Safety:   Hearing Loss  Hearing is good. Activities of Daily Living  The home contains: handrails  Patient needs help with:  transportation, preparing meals, housework, managing medications and walking    Fall Risk  Fall Risk Assessment, last 12 mths 1/19/2018   Able to walk? Yes   Fall in past 12 months? Yes   Fall with injury? Yes   Number of falls in past 12 months 2   Fall Risk Score 3       Abuse Screen  Patient is not abused    Cognitive Screening   Evaluation of Cognitive Function:  Has your family/caregiver stated any concerns about your memory: yes  Normal, Abnormal MOCA but this is confounded by expressive aphasia    Patient Care Team   Patient Care Team:  Jasmyne James MD as PCP - General (Internal Medicine)  Ke Zarate MD (Neurology)  Nubia Olsen MD (Ophthalmology)    Assessment/Plan   Education and counseling provided:  Are appropriate based on today's review and evaluation  End-of-Life planning (with patient's consent)    Diagnoses and all orders for this visit:    1. Medicare annual wellness visit, subsequent    2. Pain of left eye  -     REFERRAL TO OPHTHALMOLOGY    3. CVA, old, homonymous hemianopsia  -     REFERRAL TO OPHTHALMOLOGY    4.  Expressive aphasia        Health Maintenance Due   Topic Date Due    GLAUCOMA SCREENING Q2Y  07/23/2003

## 2018-01-29 NOTE — MR AVS SNAPSHOT
[address] [address] [facility] [address] [phone] Patient: Poncho Jacobs MRN: [id number] YB Visit Information Date & Time Provider Department Dept. Phone Encounter #  
 2018 11:15 AM Jimbo Augustine, [employer] [phone] [id number] Your Appointments 2018  1:30 PM  
Follow Up with Justine Gould MD  
Cardio Specialist at [facility] [facility]) Appt Note: 6 months [facility] [address] [place] [address] Upcoming Health Maintenance Date Due  
 GLAUCOMA SCREENING Q2Y 2003 Pneumococcal 65+ Low/Medium Risk (2 of 2 - PCV13) 2/10/2018 MEDICARE YEARLY EXAM 2019 DTaP/Tdap/Td series (2 - Td) 2027 Allergies as of 2018  Review Complete On: 2018 By: Benton Heimlich Severity Noted Reaction Type Reactions Codeine  10/20/2016    Itching Current Immunizations  Never Reviewed Name Date Influenza High Dose Vaccine PF 2017  1:45 PM  
 Influenza Vaccine 2017 12:00 AM  
 Pneumococcal Polysaccharide (PPSV-23) 2/10/2017 12:00 AM, 2016 Tdap 2017 12:00 AM, 2/10/2017 12:00 AM  
  
 Not reviewed this visit You Were Diagnosed With   
  
 Codes Comments Medicare annual wellness visit, subsequent    -  Primary ICD-10-CM: Z00.00 ICD-9-CM: V70.0 Pain of left eye     ICD-10-CM: H57.12 
ICD-9-CM: 379.91   
 CVA, old, homonymous hemianopsia     ICD-10-CM: I69.398, C62.252 ICD-9-CM: 438.7, 368.46 Expressive aphasia     ICD-10-CM: R47.01 
ICD-9-CM: 010. 3 Vitals BP Pulse Temp Resp Height(growth percentile) Weight(growth percentile) 120/70 (BP 1 Location: Left arm, BP Patient Position: Sitting) 86 97.9 °F (36.6 °C) (Oral) 20 5' 4\" (1.626 m) 128 lb 12.8 oz (58.4 kg) SpO2 BMI OB Status Smoking Status 98% 22.11 kg/m2 Hysterectomy Former Smoker Vitals History BMI and BSA Data Body Mass Index Body Surface Area  
 22.11 kg/m 2 1.62 m 2 Preferred Pharmacy Pharmacy Name Phone Eliana Wilson [phone] Your Updated Medication List  
  
   
This list is accurate as of: 1/29/18 12:05 PM.  Always use your most recent med list.  
  
  
  
  
 albuterol 90 mcg/actuation inhaler Commonly known as:  PROVENTIL HFA, VENTOLIN HFA, PROAIR HFA Take 2 Puffs by inhalation every four (4) hours as needed for Shortness of Breath (cough). CALCIUM 600 WITH VITAMIN D3 600 mg(1,500mg) -400 unit Cap Generic drug:  Calcium-Cholecalciferol (D3) Take  by mouth. 500 calcium 200 units of vitamin d  
  
 cholecalciferol (vitamin D3) 2,000 unit Tab Take  by mouth.  
  
 escitalopram oxalate 10 mg tablet Commonly known as:  Rell [place] Take 1 Tab by mouth daily. At 7pm  
  
 fluticasone-salmeterol 250-50 mcg/dose diskus inhaler Commonly known as:  ADVAIR Take 1 Puff by inhalation two (2) times a day. mirabegron ER 25 mg ER tablet Commonly known as:  MYRBETRIQ Take 1 Tab by mouth daily. Indications: Bladder Hyperactivity  
  
 senna-docusate 8.6-50 mg per tablet Commonly known as:  Henery Ruse Take 1 Tab by mouth daily. As needed for constipation  
  
 traMADol 50 mg tablet Commonly known as:  ULTRAM  
Take 50 mg by mouth every six (6) hours as needed for Pain.  
  
 umeclidinium 62.5 mcg/actuation inhaler Commonly known as:  INCRUSE ELLIPTA Take 1 Puff by inhalation daily. Indications: PREVENTION OF BRONCHOSPASMS WITH EMPHYSEMA We Performed the Following REFERRAL TO OPHTHALMOLOGY [REF57 Custom] Referral Information Referral ID Referred By Referred To  
  
 [id number] [facility], [place] [address] [facility] Phone: [phone] Fax: [phone] Visits Status Start Date End Date 1 New Request 1/29/18 1/29/19 If your referral has a status of pending review or denied, additional information will be sent to support the outcome of this decision. Patient Instructions Medicare Wellness Visit, Female The best way to live healthy is to have a healthy lifestyle by eating a well-balanced diet, exercising regularly, limiting alcohol and stopping smoking. Regular physical exams and screening tests are another way to keep healthy. Preventive exams provided by your health care provider can find health problems before they become diseases or illnesses. Preventive services including immunizations, screening tests, monitoring and exams can help you take care of your own health. All people over age 72 should have a pneumovax  and and a prevnar shot to prevent pneumonia. These are once in a lifetime unless you and your provider decide differently. All people over 65 should have a yearly flu shot and a tetanus vaccine every 10 years. A bone mass density to screen for osteoporosis or thinning of the bones should be done every 2 years after 65. Screening for diabetes mellitus with a blood sugar test should be done every year. Glaucoma is a disease of the eye due to increased ocular pressure that can lead to blindness and it should be done every year by an eye professional. 
 
Cardiovascular screening tests that check for elevated lipids (fatty part of blood) which can lead to heart disease and strokes should be done every 5 years. Colorectal screening that evaluates for blood or polyps in your colon should be done yearly as a stool test or every five years as a flexible sigmoidoscope or every 10 years as a colonoscopy up to age 76. Breast cancer screening with a mammogram is recommended biennially  for women age 54-69. Screening for cervical cancer with a pap smear and pelvic exam is recommended for women after age 72 years every 2 years up to age 79 or when the provider and patient decide to stop. If there is a history of cervical abnormalities or other increased risk for cancer then the test is recommended yearly. Hepatitis C screening is also recommended for anyone born between 80 through [address]. A shingles vaccine is also recommended once in a lifetime after age 61. Your Medicare Wellness Exam is recommended annually. Here is a list of your current Health Maintenance items with a due date: 
Health Maintenance Due Topic Date Due  Shingles Vaccine  05/23/1998  Glaucoma Screening   07/23/2003 Niles Flores Annual Well Visit  12/10/2017 Introducing [facility] & HEALTH SERVICES! Vladislav Ambriz introduces [employer] patient portal. Now you can access parts of your medical record, email your doctor's office, and request medication refills online. 1. In your internet browser, go to [email or web address] 2. Click on the First Time User? Click Here link in the Sign In box. You will see the New Member Sign Up page. 3. Enter your [employer] Access Code exactly as it appears below. You will not need to use this code after youve completed the sign-up process. If you do not sign up before the expiration date, you must request a new code. · [employer] Access Code: [email or web address] Expires: 3/19/2018  2:40 PM 
 
4. Enter the last four digits of your Social Security Number (xxxx) and Date of Birth (mm/dd/yyyy) as indicated and click Submit. You will be taken to the next sign-up page. 5. Create a Play[employer]art ID. This will be your Play[employer] login ID and cannot be changed, so think of one that is secure and easy to remember. 6. Create a Play[employer] password. You can change your password at any time. 7. Enter your Password Reset Question and Answer. This can be used at a later time if you forget your password. 8. Enter your e-mail address. You will receive e-mail notification when new information is available in [address] 9. Click Sign Up. You can now view and download portions of your medical record. 10. Click the Download Summary menu link to download a portable copy of your medical information. If you have questions, please visit the Frequently Asked Questions section of the [employer] website. Remember, [employer] is NOT to be used for urgent needs. For medical emergencies, dial 911. Now available from your iPhone and Android! Please provide this summary of care documentation to your next provider. Your primary care clinician is listed as Perla 13. If you have any questions after today's visit, please call [phone].

## 2018-01-29 NOTE — ACP (ADVANCE CARE PLANNING)
Advance Care Planning (ACP) Provider Conversation Snapshot    Date of ACP Conversation: 01/29/18  Persons included in Conversation:  patient and family  Length of ACP Conversation in minutes:  <16 minutes (Non-Billable)    Authorized Decision Maker (if patient is incapable of making informed decisions):    This person is:   daughter          For Patients with Decision Making Capacity:   pt is DNR  dDNR form completed    Conversation Outcomes / Follow-Up Plan:   Recommended completion of Advance Directive form after review of ACP materials and conversation with prospective healthcare agent

## 2018-02-01 NOTE — TELEPHONE ENCOUNTER
Martha @[facility], called to let office know pt will have a missed visit for PT today due to going out of town until Monday

## 2018-02-15 NOTE — TELEPHONE ENCOUNTER
Manish Conway, [facility] home [facility] nurse called to let us know patient had a fall last night. Was transported to the ER. Pt has 3 stitches on her elbow and a bandage on her left shin. The nurse will change dressings 3 times a week for 2-3 weeks  and will remove her stitches as well. Pt's daughter is happy to have home health manage unless we feel patient should be seen.

## 2018-02-21 NOTE — TELEPHONE ENCOUNTER
Jaquelin Mccoy RN, [facility] home [facility] called to update status. They are removing her stitches, healing well. Skilled nursing will monitor her for 2 weeks. Pt continues to have ST, PT and OT.  Will send orders for [facility]

## 2018-02-23 NOTE — PROGRESS NOTES
Jerri Allen is a 76 y.o. female (: 1940) presenting to address:    No chief complaint on file. Vitals:    18 1407   BP: 144/78   Pulse: 80   Resp: 18   Temp: 97.7 °F (36.5 °C)   TempSrc: Oral   SpO2: 96%   Weight: 127 lb (57.6 kg)   Height: 5' 4\" (1.626 m)   PainSc:   5   PainLoc: Neck       Hearing/Vision:   No exam data present    Learning Assessment:     Learning Assessment 10/20/2016   PRIMARY LEARNER Patient   PRIMARY LANGUAGE ENGLISH   LEARNER PREFERENCE PRIMARY DEMONSTRATION   ANSWERED BY patient   RELATIONSHIP SELF     Depression Screening:     PHQ over the last two weeks 2018   Little interest or pleasure in doing things Not at all   Feeling down, depressed or hopeless Not at all   Total Score PHQ 2 0     Fall Risk Assessment:     Fall Risk Assessment, last 12 mths 2018   Able to walk? No   Fall in past 12 months? -   Fall with injury? -   Number of falls in past 12 months -   Fall Risk Score -     Abuse Screening:     Abuse Screening Questionnaire 2017   Do you ever feel afraid of your partner? N   Are you in a relationship with someone who physically or mentally threatens you? N   Is it safe for you to go home? Y     Coordination of Care Questionaire:   1. Have you been to the ER, urgent care clinic since your last visit? Hospitalized since your last visit? Yes, ED [place] [address]      2. Have you seen or consulted any other health care providers outside of the [address] [place] [address] since your last visit? Include any pap smears or colon screening  No , upcoming eye appt 3/5/18    Advanced Directive:   1. Do you have an Advanced Directive? No   2. Would you like information on Advanced Directives?   No   Health Maintenance Due   Topic Date Due    GLAUCOMA SCREENING Q2Y  2003    Pneumococcal 65+ Low/Medium Risk (2 of 2 - PCV13) 02/10/2018

## 2018-02-23 NOTE — MR AVS SNAPSHOT
[address] [place] [address] 
 
 
 [address] [facility] [address] 
[phone] Patient: Mat Salguero MRN: [id number] ARMIN:[birth year] Visit Information Date & Time Provider Department Dept. Phone Encounter #  
 2/23/2018  2:00 PM [facility] [phone] [id number] Your Appointments 4/23/2018 11:00 AM  
Follow Up with Joselyn Stark MD  
[address] [facility] Appt Note: 3 month f/u  
 [address] [facility] [id number] [phone]  
  
   
 [address] 7/23/2018  1:30 PM  
Follow Up with Arbutus Scheuermann, MD  
Cardio Specialist [facility] Appt Note: 6 months [id number] [facility] [address]  
  
   
 [id number] [place] [address] [address] Upcoming Health Maintenance Date Due  
 GLAUCOMA SCREENING Q2Y 7/23/2003 Pneumococcal 65+ Low/Medium Risk (2 of 2 - PCV13) 2/10/2018 MEDICARE YEARLY EXAM 1/30/2019 DTaP/Tdap/Td series (2 - Td) 12/11/2027 Allergies as of 2/23/2018  Review Complete On: 2/23/2018 By: Joselyn Stark MD  
  
 Severity Noted Reaction Type Reactions Codeine  10/20/2016    Itching Current Immunizations  Never Reviewed Name Date Influenza High Dose Vaccine PF 9/18/2017  1:45 PM  
 Influenza Vaccine 11/11/2017 12:00 AM  
 Pneumococcal Polysaccharide (PPSV-23) 2/10/2017 12:00 AM, 11/14/2016 Tdap 12/11/2017 12:00 AM, 2/10/2017 12:00 AM  
  
 Not reviewed this visit You Were Diagnosed With   
  
 Codes Comments Fall, initial encounter    -  Primary ICD-10-CM: W19. Jaun Arts ICD-9-CM: E888.9 Neck pain     ICD-10-CM: M54.2 ICD-9-CM: 723.1 Muscle spasm     ICD-10-CM: O63.886 ICD-9-CM: 728.85 Vitals BP Pulse Temp Resp Height(growth percentile) Weight(growth percentile) 144/78 (BP 1 Location: Left arm, BP Patient Position: Sitting) 80 97.7 °F (36.5 °C) (Oral) 18 5' 4\" (1.626 m) 127 lb (57.6 kg) SpO2 BMI OB Status Smoking Status 96% 21.8 kg/m2 Hysterectomy Former Smoker BMI and BSA Data Body Mass Index Body Surface Area  
 21.8 kg/m 2 1.61 m 2 Preferred Pharmacy Pharmacy Name Phone Dianne Torrez, [phone] [phone] Your Updated Medication List  
  
   
This list is accurate as of 2/23/18  2:29 PM.  Always use your most recent med list.  
  
  
  
  
 albuterol 90 mcg/actuation inhaler Commonly known as:  PROVENTIL HFA, VENTOLIN HFA, PROAIR HFA Take 2 Puffs by inhalation every four (4) hours as needed for Shortness of Breath (cough). CALCIUM 600 WITH VITAMIN D3 600 mg(1,500mg) -400 unit Cap Generic drug:  Calcium-Cholecalciferol (D3) Take  by mouth. 500 calcium 200 units of vitamin d  
  
 cholecalciferol (vitamin D3) 2,000 unit Tab Take  by mouth.  
  
 escitalopram oxalate 10 mg tablet Commonly known as:  Ky Lemmings Take 1 Tab by mouth daily. At 7pm  
  
 fluticasone-salmeterol 250-50 mcg/dose diskus inhaler Commonly known as:  ADVAIR Take 1 Puff by inhalation two (2) times a day. mirabegron ER 25 mg ER tablet Commonly known as:  MYRBETRIQ Take 1 Tab by mouth daily. Indications: Bladder Hyperactivity  
  
 senna-docusate 8.6-50 mg per tablet Commonly known as:  Verenice [place] Take 1 Tab by mouth daily. As needed for constipation  
  
 traMADol 50 mg tablet Commonly known as:  ULTRAM  
Take 50 mg by mouth every six (6) hours as needed for Pain.  
  
 umeclidinium 62.5 mcg/actuation inhaler Commonly known as:  INCRUSE ELLIPTA Take 1 Puff by inhalation daily. Indications: PREVENTION OF BRONCHOSPASMS WITH EMPHYSEMA Patient Instructions Neck Spasm: Exercises Your Care Instructions Here are some examples of typical rehabilitation exercises for your condition. Start each exercise slowly. Ease off the exercise if you start to have pain. Your doctor or physical therapist will tell you when you can start these exercises and which ones will work best for you. How to do the exercises Levator scapula stretch 1. Sit in a firm chair, or stand up straight. 2. Gently tilt your head toward your left shoulder. 3. Turn your head to look down into your armpit, bending your head slightly forward. Let the weight of your head stretch your neck muscles. 4. Hold for 15 to 30 seconds. 5. Return to your starting position. 6. Follow the same instructions above, but tilt your head toward your right shoulder. 7. Repeat 2 to 4 times toward each shoulder. Upper trapezius stretch 1. Sit in a firm chair, or stand up straight. 2. This stretch works best if you keep your shoulder down as you lean away from it. To help you remember to do this, start by relaxing your shoulders and lightly holding on to your thighs or your chair. 3. Tilt your head toward your shoulder and hold for 15 to 30 seconds. Let the weight of your head stretch your muscles. 4. If you would like a little added stretch, place your arm behind your back. Use the arm opposite of the direction you are tilting your head. For example, if you are tilting your head to the left, place your right arm behind your back. 5. Repeat 2 to 4 times toward each shoulder. Neck rotation 1. Sit in a firm chair, or stand up straight. 2. Keeping your chin level, turn your head to the right, and hold for 15 to 30 seconds. 3. Turn your head to the left, and hold for 15 to 30 seconds. 4. Repeat 2 to 4 times to each side. Chin tuck 1. Lie on the floor with a rolled-up towel under your neck. Your head should be touching the floor. 2. Slowly bring your chin toward the front of your neck. 3. Hold for a count of 6, and then relax for up to 10 seconds. 4. Repeat 8 to 12 times. Forward neck flexion 1. Sit in a firm chair, or stand up straight. 2. Bend your head forward. 3. Hold for 15 to 30 seconds, then return to your starting position. 4. Repeat 2 to 4 times. Follow-up care is a key part of your treatment and safety. Be sure to make and go to all appointments, and call your doctor if you are having problems. It's also a good idea to know your test results and keep a list of the medicines you take. Where can you learn more? Go to http://estefanía-laila.info/. Enter P962 in the search box to learn more about \"Neck Spasm: Exercises. \" Current as of: March 21, 2017 Content Version: 11.4 © 8020-5145 Healthwise, [employer]. Care instructions adapted under license by [employer] (which disclaims liability or warranty for this information). If you have questions about a medical condition or this instruction, always ask your healthcare professional. [place] [address] any warranty or liability for your use of this information. Introducing [facility] & HEALTH SERVICES! [employer] introduces [employer] patient portal. Now you can access parts of your medical record, email your doctor's office, and request medication refills online. 1. In your internet browser, go to [email or web address] 2. Click on the First Time User? Click Here link in the Sign In box. You will see the New Member Sign Up page. 3. Enter your [employer] Access Code exactly as it appears below. You will not need to use this code after youve completed the sign-up process. If you do not sign up before the expiration date, you must request a new code. · [employer] Access Code: [email or web address] Expires: 3/19/2018  2:40 PM 
 
4. Enter the last four digits of your Social Security Number (xxxx) and Date of Birth (mm/dd/yyyy) as indicated and click Submit. You will be taken to the next sign-up page. 5. Create a [employer] ID. This will be your [employer] login ID and cannot be changed, so think of one that is secure and easy to remember. 6. Create a [employer] password. You can change your password at any time. 7. Enter your Password Reset Question and Answer. This can be used at a later time if you forget your password. 8. Enter your e-mail address. You will receive e-mail notification when new information is available in [address] 9. Click Sign Up. You can now view and download portions of your medical record. 10. Click the Download Summary menu link to download a portable copy of your medical information. If you have questions, please visit the Frequently Asked Questions section of the [employer] website. Remember, [employer] is NOT to be used for urgent needs. For medical emergencies, dial 911. Now available from your iPhone and Android! Please provide this summary of care documentation to your next provider. Your primary care clinician is listed as Perla 13. If you have any questions after today's visit, please call [phone].

## 2018-02-23 NOTE — PROGRESS NOTES
Assessment/Plan:    1. Fall, initial encounter with neck pain and muscle spasm- icy hot, heat (not at same time), PT. Home stretches. The plan was discussed with the patient. The patient verbalized understanding and is in agreement with the plan. All medication potential side effects were discussed with the patient. Health Maintenance:   Health Maintenance   Topic Date Due    GLAUCOMA SCREENING Q2Y  07/23/2003    Pneumococcal 65+ Low/Medium Risk (2 of 2 - PCV13) 02/10/2018    MEDICARE YEARLY EXAM  01/30/2019    DTaP/Tdap/Td series (2 - Td) 12/11/2027    OSTEOPOROSIS SCREENING (DEXA)  Completed    ZOSTER VACCINE AGE 60>  Addressed    Influenza Age 5 to Adult  Addressed     Sivan Malcolm is a 76 y.o. female and presents with No chief complaint on file. Subjective:  Pt continues to have falls. She had been referred to cards for holter given a history of syncope, however, pt refused. She had also been referred to neuro for eval of this issue b/c there is a remote h/o seizures. The details aren't clear with respect to EEG confirmation. She was apparently treated empirically, but had side effects from the meds, so she stopped. She has appt 2/6 with neuro. She slipped on a wet spot while trying to get into shower, hitting her head on the floor. She has ongoing neck pain since then. CT cervical spine didn't show anything acute. Her daughter reports she hasn't gotten out of bed the past week b/c of her neck pain. ROS:  Constitutional: No recent weight change. No weakness/fatigue. No f/c. HENT: No HA, dizziness. No hearing loss/tinnitus. No nasal congestion/discharge. Eyes: No change in vision, double/blurred vision or eye pain/redness. Cardiovascular: No CP/palpitations. No ALY/orthopnea/PND. Respiratory: No cough/sputum, dyspnea, wheezing. Gastointestinal: No dysphagia, reflux. No n/v. No constipation/diarrhea. No melena/rectal bleeding.      The problem list was updated as a part of today's visit. 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    Paroxysmal atrial fibrillation (HCC) I48.0    Contraindication to anticoagulation therapy Z53.09    Frequent falls R29.6    Right hand weakness R29.898    Contracture, right hand M24.541    Pure hypercholesterolemia E78.00    Full code status Z78.9    Subclinical hypothyroidism E03.9    OAB (overactive bladder) N32.81    Dysthymia F34.1    Vitamin D deficiency E55.9    First degree AV block I44.0    Chronic cough R05    History of hemorrhagic stroke with residual hemiplegia (HCC) I69.359    History of vertebral compression fracture Z87.81    Centrilobular emphysema (HCC) J43.2    Recurrent depression ([facility]) F33.9    Expressive aphasia R47.01       The PSH, FH were reviewed. SH:  Social History   Substance Use Topics    Smoking status: Former Smoker     Years: 10.00    Smokeless tobacco: Never Used    Alcohol use 0.6 oz/week     1 Glasses of wine per week       Medications/Allergies:  Current Outpatient Prescriptions on File Prior to Visit   Medication Sig Dispense Refill    fluticasone-salmeterol (ADVAIR) 250-50 mcg/dose diskus inhaler Take 1 Puff by inhalation two (2) times a day. 3 Inhaler 3    traMADol (ULTRAM) 50 mg tablet Take 50 mg by mouth every six (6) hours as needed for Pain.  Calcium-Cholecalciferol, D3, (CALCIUM 600 WITH VITAMIN D3) 600 mg(1,500mg) -400 unit cap Take  by mouth. 500 calcium 200 units of vitamin d      senna-docusate (PERICOLACE) 8.6-50 mg per tablet Take 1 Tab by mouth daily. As needed for constipation      mirabegron ER (MYRBETRIQ) 25 mg ER tablet Take 1 Tab by mouth daily. Indications: Bladder Hyperactivity 90 Tab 0    umeclidinium (INCRUSE ELLIPTA) 62.5 mcg/actuation inhaler Take 1 Puff by inhalation daily.  Indications: PREVENTION OF BRONCHOSPASMS WITH EMPHYSEMA 30 Inhaler 3    albuterol (PROVENTIL HFA, VENTOLIN HFA, PROAIR HFA) 90 mcg/actuation inhaler Take 2 Puffs by inhalation every four (4) hours as needed for Shortness of Breath (cough). 1 Inhaler 1    escitalopram oxalate (LEXAPRO) 10 mg tablet Take 1 Tab by mouth daily. At 7pm 90 Tab 2    cholecalciferol, vitamin D3, 2,000 unit tab Take  by mouth. No current facility-administered medications on file prior to visit. Allergies   Allergen Reactions    Codeine Itching     Objective:  Visit Vitals    /78 (BP 1 Location: Left arm, BP Patient Position: Sitting)    Pulse 80    Temp 97.7 °F (36.5 °C) (Oral)    Resp 18    Ht 5' 4\" (1.626 m)    Wt 127 lb (57.6 kg)    SpO2 96%    BMI 21.8 kg/m2      Constitutional: Well developed, nourished, no distress, alert   CV: S1, S2.  RRR. No murmurs/rubs. No thrills palpated. No carotid bruits. Intact distal pulses. No edema. Pulm: No abnormalities on inspection. Clear to auscultation bilaterally. No wheezing/rhonchi. Normal effort. MS: Pain over bilat cervical paraspinal muscles. Pain with minimal ROM of neck.

## 2018-02-23 NOTE — PATIENT INSTRUCTIONS
Neck Spasm: Exercises  Your Care Instructions  Here are some examples of typical rehabilitation exercises for your condition. Start each exercise slowly. Ease off the exercise if you start to have pain. Your doctor or physical therapist will tell you when you can start these exercises and which ones will work best for you. How to do the exercises  Levator scapula stretch    1. Sit in a firm chair, or stand up straight. 2. Gently tilt your head toward your left shoulder. 3. Turn your head to look down into your armpit, bending your head slightly forward. Let the weight of your head stretch your neck muscles. 4. Hold for 15 to 30 seconds. 5. Return to your starting position. 6. Follow the same instructions above, but tilt your head toward your right shoulder. 7. Repeat 2 to 4 times toward each shoulder. Upper trapezius stretch    1. Sit in a firm chair, or stand up straight. 2. This stretch works best if you keep your shoulder down as you lean away from it. To help you remember to do this, start by relaxing your shoulders and lightly holding on to your thighs or your chair. 3. Tilt your head toward your shoulder and hold for 15 to 30 seconds. Let the weight of your head stretch your muscles. 4. If you would like a little added stretch, place your arm behind your back. Use the arm opposite of the direction you are tilting your head. For example, if you are tilting your head to the left, place your right arm behind your back. 5. Repeat 2 to 4 times toward each shoulder. Neck rotation    1. Sit in a firm chair, or stand up straight. 2. Keeping your chin level, turn your head to the right, and hold for 15 to 30 seconds. 3. Turn your head to the left, and hold for 15 to 30 seconds. 4. Repeat 2 to 4 times to each side. Chin tuck    1. Lie on the floor with a rolled-up towel under your neck. Your head should be touching the floor. 2. Slowly bring your chin toward the front of your neck.   3. Hold for a count of 6, and then relax for up to 10 seconds. 4. Repeat 8 to 12 times. Forward neck flexion    1. Sit in a firm chair, or stand up straight. 2. Bend your head forward. 3. Hold for 15 to 30 seconds, then return to your starting position. 4. Repeat 2 to 4 times. Follow-up care is a key part of your treatment and safety. Be sure to make and go to all appointments, and call your doctor if you are having problems. It's also a good idea to know your test results and keep a list of the medicines you take. Where can you learn more? Go to http://estefanía-laila.info/. Enter P962 in the search box to learn more about \"Neck Spasm: Exercises. \"  Current as of: March 21, 2017  Content Version: 11.4  © 6945-8234 [employer], [employer]. Care instructions adapted under license by [employer] (which disclaims liability or warranty for this information). If you have questions about a medical condition or this instruction, always ask your healthcare professional. [place] [address] any warranty or liability for your use of this information.

## 2018-02-23 NOTE — TELEPHONE ENCOUNTER
Leonila ruth speech therapist wanted to call and inform us of the services she is doing for the pt. She is being treated for a risk of aspiration, speech articulation and her cognition.

## 2018-03-12 NOTE — TELEPHONE ENCOUNTER
Can't increase lexapro due to interactions with other meds. I'd recommend changing lexapro to zoloft and f/u in office in one month.   Does she want me to send that in?

## 2018-03-12 NOTE — TELEPHONE ENCOUNTER
Daughter does not want to pursue med change. States pt has an appt in April. We will see how she is at that time.

## 2018-03-12 NOTE — TELEPHONE ENCOUNTER
Daughter called asking if lexapro can be increased. States pt has high anxiety and has been crying every day since her neck injury in Feb. Asks that we send a new Rx to [facility].

## 2018-03-30 NOTE — TELEPHONE ENCOUNTER
Spoke with daughter notified of message she is staying they have stopped and request the office call to tell them to restart med

## 2018-03-30 NOTE — TELEPHONE ENCOUNTER
Patient's daughter called stating that the [facility] stopped giving the patient the lexopro even though there is no documentation that the patient is being taken off of it even per the the documentation on 3/12/18. Please contact [phone] or Arabella the Director of Nursing.  [phone]

## 2018-03-30 NOTE — TELEPHONE ENCOUNTER
Spoke with ovidio the director of nursing told her to restart lexapro per dr quarles's also faxed order for patient to continue this medication she voiced understanding and will get order and restart med

## 2018-03-30 NOTE — TELEPHONE ENCOUNTER
Tell daughter we got a note from [facility] saying their inhouse psych d/c'd it.   I think it's ok to stay on for now until she sees me

## 2018-03-30 NOTE — TELEPHONE ENCOUNTER
Edilma Owusu, patient's home health nurse, called in to state she missed a scheduled visit on 3/27/2018 due to her building being under quarantine and she is being discharged from home health today 3/30/2018.

## 2018-04-03 NOTE — MR AVS SNAPSHOT
Nick Stephenson 
 
 
 [address] [facility] [address] 
[phone] Patient: Srini Glass MRN: [id number] PLL:[birth year] Visit Information Date & Time Provider Department Dept. Phone Encounter #  
 4/3/2018  2:15 PM Martha Springer, 3 [facility] [phone] [id number] Your Appointments 4/23/2018 11:00 AM  
Follow Up with Martha Springer MD  
3 [facility]) Appt Note: 3 month f/u  
 [address] [facility] [address]  
[phone]  
  
   
 [id number] [place] [address] 7/23/2018  1:30 PM  
Follow Up with Melinda Gee MD  
Cardio Specialist at [facility]) Appt Note: 6 months [facility] [address] Upcoming Health Maintenance Date Due  
 GLAUCOMA SCREENING Q2Y 7/23/2003 Bone Densitometry (Dexa) Screening 7/23/2003 Pneumococcal 65+ Low/Medium Risk (2 of 2 - PCV13) 2/10/2018 MEDICARE YEARLY EXAM 1/30/2019 DTaP/Tdap/Td series (2 - Td) 12/11/2027 Allergies as of 4/3/2018  Review Complete On: 4/3/2018 By: Martha Springer MD  
  
 Severity Noted Reaction Type Reactions Codeine  10/20/2016    Itching Current Immunizations  Never Reviewed Name Date Influenza High Dose Vaccine PF 9/18/2017  1:45 PM  
 Influenza Vaccine 11/11/2017 12:00 AM  
 Pneumococcal Polysaccharide (PPSV-23) 2/10/2017 12:00 AM, 11/14/2016 Tdap 12/11/2017 12:00 AM, 2/10/2017 12:00 AM  
  
 Not reviewed this visit You Were Diagnosed With   
  
 Codes Comments Recurrent falls    -  Primary ICD-10-CM: R29.6 ICD-9-CM: V15.88   
 CVA, old, homonymous hemianopsia     ICD-10-CM: I69.398, L75.609 ICD-9-CM: 438.7, 368.46  History of hemorrhagic stroke with residual hemiplegia ([facility] [address] ICD-10-CM: N85.523 ICD-9-CM: 438.20 Leukocytes in urine     ICD-10-CM: R82.99 
ICD-9-CM: 791.7 Vitals BP Pulse Temp Resp Height(growth percentile) Weight(growth percentile) 130/70 (BP 1 Location: Left arm, BP Patient Position: Sitting) 85 97.7 °F (36.5 °C) (Oral) 20 5' 4\" (1.626 m) 127 lb (57.6 kg) SpO2 BMI OB Status Smoking Status 95% 21.8 kg/m2 Hysterectomy Former Smoker Vitals History BMI and BSA Data Body Mass Index Body Surface Area  
 21.8 kg/m 2 1.61 m 2 Preferred Pharmacy Pharmacy Name Phone [facility], [address] [phone] Your Updated Medication List  
  
   
This list is accurate as of 4/3/18  3:12 PM.  Always use your most recent med list.  
  
  
  
  
 albuterol 90 mcg/actuation inhaler Commonly known as:  PROVENTIL HFA, VENTOLIN HFA, PROAIR HFA Take 2 Puffs by inhalation every four (4) hours as needed for Shortness of Breath (cough). CALCIUM 600 WITH VITAMIN D3 600 mg(1,500mg) -400 unit Cap Generic drug:  Calcium-Cholecalciferol (D3) Take  by mouth. 500 calcium 200 units of vitamin d  
  
 cholecalciferol (vitamin D3) 2,000 unit Tab Take  by mouth.  
  
 escitalopram oxalate 10 mg tablet Commonly known as:  Napolean Mantle Take 1 Tab by mouth daily. At 7pm  
  
 fluticasone-salmeterol 250-50 mcg/dose diskus inhaler Commonly known as:  ADVAIR Take 1 Puff by inhalation two (2) times a day. lanolin-mineral oil Oil Commonly known as:  Eliu Shell Apply as needed to affected areas. Patient to apply  
  
 mirabegron ER 25 mg ER tablet Commonly known as:  MYRBETRIQ Take 1 Tab by mouth daily. Indications: Bladder Hyperactivity  
  
 senna-docusate 8.6-50 mg per tablet Commonly known as:  Shay Natanael Take 1 Tab by mouth daily. As needed for constipation  
  
 traMADol 50 mg tablet Commonly known as:  Mgean Houston  
 Take 50 mg by mouth every six (6) hours as needed for Pain.  
  
 umeclidinium 62.5 mcg/actuation inhaler Commonly known as:  INCRUSE ELLIPTA Take 1 Puff by inhalation daily. Indications: PREVENTION OF BRONCHOSPASMS WITH EMPHYSEMA Prescriptions Printed Refills  
 lanolin-mineral oil (THERADERM) oil 6 Sig: Apply as needed to affected areas. Patient to apply Class: Print We Performed the Following AMB POC URINALYSIS DIP STICK AUTO W/O MICRO [90262 CPT(R)] To-Do List   
 04/03/2018 Microbiology:  CULTURE, URINE Patient Instructions You have been referred to cardiology. Please call one of the preferred providers listed below and schedule your appointment. Once you have scheduled your appointment, please call the office at [phone] and leave the details of your appointment (provider you will be seeing, appointment date and time) on the voice mail. Cardiovascular Specialists Dr. Duane Ralph [phone] (specify the [facility]) [address] [place] [address] Introducing [facility] SERVICES! [facility] introduces [employer] patient portal. Now you can access parts of your medical record, email your doctor's office, and request medication refills online. 1. In your internet browser, go to [email or web address] 2. Click on the First Time User? Click Here link in the Sign In box. You will see the New Member Sign Up page. 3. Enter your [employer] Access Code exactly as it appears below. You will not need to use this code after youve completed the sign-up process. If you do not sign up before the expiration date, you must request a new code. · [employer] Access Code: [email or web address] Expires: 7/2/2018  3:08 PM 
 
4. Enter the last four digits of your Social Security Number (xxxx) and Date of Birth (mm/dd/yyyy) as indicated and click Submit. You will be taken to the next sign-up page. 5. Create a [employer] ID. This will be your [employer] login ID and cannot be changed, so think of one that is secure and easy to remember. 6. Create a [employer] password. You can change your password at any time. 7. Enter your Password Reset Question and Answer. This can be used at a later time if you forget your password. 8. Enter your e-mail address. You will receive e-mail notification when new information is available in [address] 9. Click Sign Up. You can now view and download portions of your medical record. 10. Click the Download Summary menu link to download a portable copy of your medical information. If you have questions, please visit the Frequently Asked Questions section of the [employer] website. Remember, [employer] is NOT to be used for urgent needs. For medical emergencies, dial 911. Now available from your iPhone and Android! Please provide this summary of care documentation to your next provider. Your primary care clinician is listed as Perla 13. If you have any questions after today's visit, please call [phone].

## 2018-04-03 NOTE — TELEPHONE ENCOUNTER
Pt's residence called. The [facility] nurse Jose Daniel dsouza called before our visit today. The facility has concerns with patient's continuing falls and is questioning whether alcohol use may be contributing to them. They asked we address in today's visit. Pt's MAR states pt may have a glass of wine. Pt had visit. Called nurse Jose Daniel dsouza to let her know what was ordered with today's visit. Nurse says that a semi full bottle of wine was found in patient's room and also a nearly empty bottle of [employer]. They would like her urine culture results forwarded to them as well.

## 2018-04-03 NOTE — PROGRESS NOTES
Lacie Murray is a 76 y.o. female (: 1940) presenting to address:    Chief Complaint   Patient presents with    Fall    Head Pain    Leg Pain     RLE       Vitals:    18 1429   BP: 130/70   Pulse: 85   Resp: 20   Temp: 97.7 °F (36.5 °C)   TempSrc: Oral   SpO2: 95%   Weight: 127 lb (57.6 kg)   Height: 5' 4\" (1.626 m)   PainSc:   0 - No pain       Learning Assessment:     Learning Assessment 10/20/2016   PRIMARY LEARNER Patient   PRIMARY LANGUAGE ENGLISH   LEARNER PREFERENCE PRIMARY DEMONSTRATION   ANSWERED BY patient   RELATIONSHIP SELF     Depression Screening:     PHQ over the last two weeks 2018   Little interest or pleasure in doing things Not at all   Feeling down, depressed or hopeless Not at all   Total Score PHQ 2 0     Fall Risk Assessment:     Fall Risk Assessment, last 12 mths 2018   Able to walk? Yes   Fall in past 12 months? Yes   Fall with injury? Yes   Number of falls in past 12 months 5   Fall Risk Score 6     Abuse Screening:     Abuse Screening Questionnaire 2018   Do you ever feel afraid of your partner? N   Are you in a relationship with someone who physically or mentally threatens you? N   Is it safe for you to go home? Y     Coordination of Care Questionaire:   1. Have you been to the ER, urgent care clinic since your last visit? Hospitalized since your last visit? NO    2. Have you seen or consulted any other health care providers outside of the [address] [place] [address] since your last visit? Include any pap smears or colon screening. NO    Advanced Directive:   1. Do you have an Advanced Directive? YES    2. Would you like information on Advanced Directives?  NO

## 2018-04-03 NOTE — PROGRESS NOTES
Assessment/Plan:    1. Recurrent falls with numerous risk factors, including hemianopsia, R hemiplegia, generalized deconditioning, noncompliance with use of assistive devices (walkers/cane). I still want to r/o arrhythmias - pt to reschedule appt with cards for holter. I also want neuro input (has appt with Dr. Karla Quiñones upcoming on 4/24). U/a positive for leukocytes, send for culture. - AMB POC URINALYSIS DIP STICK AUTO W/O MICRO      The plan was discussed with the patient. The patient verbalized understanding and is in agreement with the plan. All medication potential side effects were discussed with the patient. Health Maintenance:   Health Maintenance   Topic Date Due    GLAUCOMA SCREENING Q2Y  07/23/2003    Bone Densitometry (Dexa) Screening  07/23/2003    Pneumococcal 65+ Low/Medium Risk (2 of 2 - PCV13) 02/10/2018    MEDICARE YEARLY EXAM  01/30/2019    DTaP/Tdap/Td series (2 - Td) 12/11/2027    ZOSTER VACCINE AGE 60>  Addressed    Influenza Age 5 to Adult  Addressed       Solomon Ward is a 76 y.o. female and presents with Fall; Head Pain; and Leg Pain (RLE)     Subjective:  Her balance is poor, has known gait instability. Has difficulty getting out of bed/chairs. She often doesn't use a walker or cane, as she is supposed to. She had another fall yesterday - fell while getting her pajamas on. Has R hemiparesis from previous hemorrhagic stroke. She has difficulty getting herself off the floor. She takes vit D and she is working with physical therapy. She is followed by neuro, Dr. Karla Quiñones, has appt later this month for an episode of syncope that was concerning for seizures. [facility] was [facility], that she is drinking ETOH. She has a bottle of wine in her room. Her daughter reports she only drinks rarely - one bottle of wine lasted 4 months. No urinary sx.  i had referred her to cards earlier this year for syncope with hopes to get holter. But pt refused. ROS:  Constitutional: No recent weight change. +weakness/no fatigue. No f/c. Cardiovascular: No CP/palpitations. No ALY/orthopnea/PND. Respiratory: No cough/sputum, dyspnea, wheezing. Gastointestinal: No dysphagia, reflux. No n/v. No constipation/diarrhea. No melena/rectal bleeding. Genitourinary: No dysuria, urinary hesitancy, nocturia, hematuria. No incontinence. Musculoskeletal: No joint pain/stiffness. No muscle pain/tenderness. Neurological: No seizures/numbness/weakness. No paresthesias. The problem list was updated as a part of today's visit. 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    Paroxysmal atrial fibrillation (HCC) I48.0    Contraindication to anticoagulation therapy Z53.09    Frequent falls R29.6    Right hand weakness R29.898    Contracture, right hand M24.541    Pure hypercholesterolemia E78.00    Full code status Z78.9    Subclinical hypothyroidism E03.9    OAB (overactive bladder) N32.81    Dysthymia F34.1    Vitamin D deficiency E55.9    First degree AV block I44.0    Chronic cough R05    History of hemorrhagic stroke with residual hemiplegia (HCC) I69.359    History of vertebral compression fracture Z87.81    Centrilobular emphysema ([facility]) J43.2    Recurrent depression ([facility]) F33.9    Expressive aphasia R47.01       The PSH, FH were reviewed. SH:  Social History   Substance Use Topics    Smoking status: Former Smoker     Years: 10.00    Smokeless tobacco: Never Used    Alcohol use 0.6 oz/week     1 Glasses of wine per week       Medications/Allergies:  Current Outpatient Prescriptions on File Prior to Visit   Medication Sig Dispense Refill    fluticasone-salmeterol (ADVAIR) 250-50 mcg/dose diskus inhaler Take 1 Puff by inhalation two (2) times a day. 3 Inhaler 3    traMADol (ULTRAM) 50 mg tablet Take 50 mg by mouth every six (6) hours as needed for Pain.  Calcium-Cholecalciferol, D3, (CALCIUM 600 WITH VITAMIN D3) 600 mg(1,500mg) -400 unit cap Take  by mouth. 500 calcium 200 units of vitamin d      senna-docusate (PERICOLACE) 8.6-50 mg per tablet Take 1 Tab by mouth daily. As needed for constipation      mirabegron ER (MYRBETRIQ) 25 mg ER tablet Take 1 Tab by mouth daily. Indications: Bladder Hyperactivity 90 Tab 0    umeclidinium (INCRUSE ELLIPTA) 62.5 mcg/actuation inhaler Take 1 Puff by inhalation daily. Indications: PREVENTION OF BRONCHOSPASMS WITH EMPHYSEMA 30 Inhaler 3    albuterol (PROVENTIL HFA, VENTOLIN HFA, PROAIR HFA) 90 mcg/actuation inhaler Take 2 Puffs by inhalation every four (4) hours as needed for Shortness of Breath (cough). 1 Inhaler 1    escitalopram oxalate (LEXAPRO) 10 mg tablet Take 1 Tab by mouth daily. At 7pm 90 Tab 2    cholecalciferol, vitamin D3, 2,000 unit tab Take  by mouth. No current facility-administered medications on file prior to visit. Allergies   Allergen Reactions    Codeine Itching       Objective:  Visit Vitals    /70 (BP 1 Location: Left arm, BP Patient Position: Sitting)    Pulse 85    Temp 97.7 °F (36.5 °C) (Oral)    Resp 20    Ht 5' 4\" (1.626 m)    Wt 127 lb (57.6 kg)    SpO2 95%    BMI 21.8 kg/m2      Constitutional: Well developed, nourished, no distress, alert   CV: S1, S2.  RRR. No murmurs/rubs. No thrills palpated. No carotid bruits. Intact distal pulses. No edema. Pulm: No abnormalities on inspection. Clear to auscultation bilaterally. No wheezing/rhonchi. Normal effort. MS: In wheelchair. RUE contracture. Neuro:  +dysarthria, appropriate responses. Skin: Ecchymoses R leg and Rarm, skin tear on Relbow , ecchymoses on L head   Psych: Appropriate affect, judgement and insight. Short-term memory impaired.

## 2018-04-04 NOTE — TELEPHONE ENCOUNTER
Fernando Prasad, occupational therapist of [address] [facility] called to inform that the pt fell and hit her head. Pt did not go to the emergency room but went into the home office of [facility] for a visit. Ms. Clinton Simmonds wanted to inform Dr. Lacie Duran of the incident. I also advised Ms. Coto to leave Dayton Avila a voicemail on the concern.

## 2018-04-04 NOTE — TELEPHONE ENCOUNTER
BRIDGETT Dean called to report the fall from Monday 4/2/18 that we saw pt for yesterday 4/3/18. It is their protocol to report this anyway. She notes bruising on head, arm and leg. This has been addressed. The message from reception is incorrect. The home health sees the patient at her residence the [facility], the patient was not seen at [facility] office.

## 2018-04-11 NOTE — TELEPHONE ENCOUNTER
Per the telephone encounter on 4/10/18:    Leela Hollingsworth from the [facility] called stating that the patient needs a stat order for a mobile x-ray of the right and left side of her chest and lower right of her abdomin.    Fax: [phone]

## 2018-04-11 NOTE — TELEPHONE ENCOUNTER
Bilateral ribs and right hip/pelvis. Sachin Monaco , nurse says pt had unwitnessed fall. Was found on floor by bed after pressing med alert necklace. Skin tear on clavicle, abd pain, cannot stand without pain. Xray orders faxed to the floor.

## 2018-04-17 NOTE — TELEPHONE ENCOUNTER
Daughter Peri Doctor called stating even though xrays were done showing no fx, pt is in so much pain from her last fall and is not responding to Tramadol. Asking for something else to be sent for her. Please advise.

## 2018-04-18 NOTE — TELEPHONE ENCOUNTER
Her Physical Therapist called to report that she would like to continue her therapy with the patient and will be faxing us over the order for us to sign and fax.

## 2018-04-19 NOTE — TELEPHONE ENCOUNTER
Daughter Jaspreet [place] states pt was not being given Tramadol on a schedule but it is better now. Pt will see dr on 4/24/18.

## 2018-04-23 NOTE — PROGRESS NOTES
Avtar Lubin is a 76 y.o. female (: 1940) presenting to address:    Chief Complaint   Patient presents with   Alexis Alas     18    ED Follow-up    Follow Up Chronic Condition       Vitals:    18 1106   BP: 118/80   Pulse: 83   Resp: 20   Temp: 97.8 °F (36.6 °C)   TempSrc: Oral   SpO2: 97%   Weight: 124 lb (56.2 kg)   Height: 5' 4\" (1.626 m)   PainSc:   5   PainLoc: Breast       Learning Assessment:     Learning Assessment 10/20/2016   PRIMARY LEARNER Patient   PRIMARY LANGUAGE ENGLISH   LEARNER PREFERENCE PRIMARY DEMONSTRATION   ANSWERED BY patient   RELATIONSHIP SELF     Depression Screening:     PHQ over the last two weeks 2018   Little interest or pleasure in doing things Not at all   Feeling down, depressed or hopeless Not at all   Total Score PHQ 2 0     Fall Risk Assessment:     Fall Risk Assessment, last 12 mths 4/3/2018   Able to walk? Yes   Fall in past 12 months? Yes   Fall with injury? Yes   Number of falls in past 12 months 5   Fall Risk Score 6     Abuse Screening:     Abuse Screening Questionnaire 2018   Do you ever feel afraid of your partner? N   Are you in a relationship with someone who physically or mentally threatens you? N   Is it safe for you to go home? Y     Coordination of Care Questionaire:   1. Have you been to the ER, urgent care clinic since your last visit? Hospitalized since your last visit? YES [facility] 18    2. Have you seen or consulted any other health care providers outside of the [address] [place] [address] since your last visit? Include any pap smears or colon screening. NO    Advanced Directive:   1. Do you have an Advanced Directive? YES    2. Would you like information on Advanced Directives?  NO

## 2018-04-23 NOTE — MR AVS SNAPSHOT
[address] 12[address] [facility] [id number] [phone] Patient: Lacie Murray MRN: [id number] Visit Information Date & Time Provider Department Dept. Phone Encounter #  
 4/23/2018 11:00 AM Jose Alejandro Mckinney, [employer] [id number] [phone] [id number] [phone] Your Appointments 7/23/2018  1:30 PM  
Follow Up with Sandra Urbina MD  
Cardio Specialist at [facility] Appt Note: 6 months [address]  
  
   
 [address] Upcoming Health Maintenance Date Due  
 GLAUCOMA SCREENING Q2Y 7/23/2003 Bone Densitometry (Dexa) Screening 7/23/2003 Pneumococcal 65+ Low/Medium Risk (2 of 2 - PCV13) 2/10/2018 MEDICARE YEARLY EXAM 1/30/2019 DTaP/Tdap/Td series (2 - Td) 12/11/2027 Allergies as of 4/23/2018  Review Complete On: 4/23/2018 By: Jose Alejandro Mckinney MD  
  
 Severity Noted Reaction Type Reactions Codeine  10/20/2016    Itching Current Immunizations  Never Reviewed Name Date Influenza High Dose Vaccine PF 9/18/2017  1:45 PM  
 Influenza Vaccine 11/11/2017 12:00 AM  
 Pneumococcal Polysaccharide (PPSV-23) 2/10/2017 12:00 AM, 11/14/2016 Tdap 12/11/2017 12:00 AM, 2/10/2017 12:00 AM  
  
 Not reviewed this visit You Were Diagnosed With   
  
 Codes Comments Closed odontoid fracture, initial encounter ([facility] [address]    -  Primary ICD-10-CM: H47.590I ICD-9-CM: 805.02 Costochondritis     ICD-10-CM: M94.0 ICD-9-CM: 733.6 Recurrent falls     ICD-10-CM: R29.6 ICD-9-CM: V15.88 Confusion     ICD-10-CM: R41.0 ICD-9-CM: 298.9 History of hemorrhagic stroke with residual hemiplegia (HCC)     ICD-10-CM: Q32.536 ICD-9-CM: 438.20 Paroxysmal atrial fibrillation (HCC)     ICD-10-CM: I48.0 ICD-9-CM: 427.31 Vitals BP Pulse Temp Resp Height(growth percentile) Weight(growth percentile) 118/80 (BP 1 Location: Left arm, BP Patient Position: Sitting) 83 97.8 °F (36.6 °C) (Oral) 20 5' 4\" (1.626 m) 124 lb (56.2 kg) SpO2 BMI OB Status Smoking Status 97% 21.28 kg/m2 Hysterectomy Former Smoker Vitals History BMI and BSA Data Body Mass Index Body Surface Area  
 21.28 kg/m 2 1.59 m 2 Preferred Pharmacy Pharmacy Name Phone [facility], [address] [phone] Your Updated Medication List  
  
   
This list is accurate as of 4/23/18 11:38 AM.  Always use your most recent med list.  
  
  
  
  
 albuterol 90 mcg/actuation inhaler Commonly known as:  PROVENTIL HFA, VENTOLIN HFA, PROAIR HFA Take 2 Puffs by inhalation every four (4) hours as needed for Shortness of Breath (cough). CALCIUM 600 WITH VITAMIN D3 600 mg(1,500mg) -400 unit Cap Generic drug:  Calcium-Cholecalciferol (D3) Take  by mouth. 500 calcium 200 units of vitamin d  
  
 cholecalciferol (vitamin D3) 2,000 unit Tab Take  by mouth.  
  
 escitalopram oxalate 10 mg tablet Commonly known as:  Kavita Li Take 1 Tab by mouth daily. At 7pm  
  
 fluticasone-salmeterol 250-50 mcg/dose diskus inhaler Commonly known as:  ADVAIR Take 1 Puff by inhalation two (2) times a day. lanolin-mineral oil Oil Commonly known as:  Catia Liming Apply as needed to affected areas. Patient to apply  
  
 mirabegron ER 25 mg ER tablet Commonly known as:  MYRBETRIQ Take 1 Tab by mouth daily. Indications: Bladder Hyperactivity  
  
 senna-docusate 8.6-50 mg per tablet Commonly known as:  Lavern Fresh Take 1 Tab by mouth daily. As needed for constipation  
  
 traMADol 50 mg tablet Commonly known as:  ULTRAM  
Take 1 Tab by mouth two (2) times daily as needed for Pain. Max Daily Amount: 100 mg.  
  
 umeclidinium 62.5 mcg/actuation inhaler Commonly known as:  INCRUSE ELLIPTA Take 1 Puff by inhalation daily. Indications: PREVENTION OF BRONCHOSPASMS WITH EMPHYSEMA To-Do List   
 04/23/2018 Imaging:  MRI BRAIN WO CONT Patient Instructions Costochondritis: Care Instructions Your Care Instructions You have chest pain because the cartilage of your rib cage is inflamed. This problem is called costochondritis. This type of chest wall pain may last from days to weeks. It is not a heart problem. Sometimes costochondritis occurs with a cold or the flu, and other times the exact cause is not known. Follow-up care is a key part of your treatment and safety. Be sure to make and go to all appointments, and call your doctor if you are having problems. It's also a good idea to know your test results and keep a list of the medicines you take. How can you care for yourself at home? · Take medicines for pain and inflammation exactly as directed. ¨ If the doctor gave you a prescription medicine, take it as prescribed. ¨ If you are not taking a prescription pain medicine, ask your doctor if you can take an over-the-counter medicine. ¨ Do not take two or more pain medicines at the same time unless the doctor told you to. Many pain medicines have acetaminophen, which is Tylenol. Too much acetaminophen (Tylenol) can be harmful. · It may help to use a warm compress or heating pad (set on low) on your chest. You can also try alternating heat and ice. Put ice or a cold pack on the area for 10 to 20 minutes at a time. Put a thin cloth between the ice and your skin. · Avoid any activity that strains the chest area. As your pain gets better, you can slowly return to your normal activities. · Do not use tape, an elastic bandage, a \"rib belt,\" or anything else that restricts your chest wall motion. When should you call for help? Call 911 anytime you think you may need emergency care. For example, call if: ? · You have new or different chest pain or pressure. This may occur with: ¨ Sweating. ¨ Shortness of breath. ¨ Nausea or vomiting. ¨ Pain that spreads from the chest to the neck, jaw, or one or both shoulders or arms. ¨ Dizziness or lightheadedness. ¨ A fast or uneven pulse. After calling 911, chew 1 adult-strength aspirin. Wait for an ambulance. Do not try to drive yourself. ? · You have severe trouble breathing. ?Call your doctor now or seek immediate medical care if: 
? · You have a fever or cough. ? · You have any trouble breathing. ? · Your chest pain gets worse. ? Watch closely for changes in your health, and be sure to contact your doctor if: 
? · Your chest pain continues even though you are taking anti-inflammatory medicine. ? · Your chest wall pain has not improved after 5 to 7 days. Where can you learn more? Go to http://estefanía-laila.info/. Enter S464 in the search box to learn more about \"Costochondritis: Care Instructions. \" Current as of: March 20, 2017 Content Version: 11.4 © 0462-0988 [employer]. Care instructions adapted under license by [employer] (which disclaims liability or warranty for this information). If you have questions about a medical condition or this instruction, always ask your healthcare professional. [place] [address] any warranty or liability for your use of this information. Introducing [facility]! [address] introduces Qui.lt patient portal. Now you can access parts of your medical record, email your doctor's office, and request medication refills online. 1. In your internet browser, go to [email or web address] ([email or web address]). [employer]/Vertive ([email or web address].com) 2. Click on the First Time User? Click Here link in the Sign In box. You will see the New Member Sign Up page. 3. Enter your Qui.lt Access Code exactly as it appears below.  You will not need to use this code after youve completed the sign-up process. If you do not sign up before the expiration date, you must request a new code. · [employer] Access Code: [email or web address] Expires: 7/2/2018  3:08 PM 
 
4. Enter the last four digits of your Social Security Number (xxxx) and Date of Birth (mm/dd/yyyy) as indicated and click Submit. You will be taken to the next sign-up page. 5. Create a [employer] ID. This will be your [employer] login ID and cannot be changed, so think of one that is secure and easy to remember. 6. Create a [employer] password. You can change your password at any time. 7. Enter your Password Reset Question and Answer. This can be used at a later time if you forget your password. 8. Enter your e-mail address. You will receive e-mail notification when new information is available in [address] 9. Click Sign Up. You can now view and download portions of your medical record. 10. Click the Download Summary menu link to download a portable copy of your medical information. If you have questions, please visit the Frequently Asked Questions section of the [employer] website. Remember, [employer] is NOT to be used for urgent needs. For medical emergencies, dial 911. Now available from your iPhone and Android! Please provide this summary of care documentation to your next provider. Your primary care clinician is listed as Perla 13. If you have any questions after today's visit, please call [phone].

## 2018-04-23 NOTE — PROGRESS NOTES
Assessment/Plan:    1. Closed odontoid fracture, initial encounter [facility])  -has f/u with neurosurgery tomorrow. Noncompliant with cervical collar. 2. Costochondritis  -tylenol. Should improve over next few weeks. 3. Recurrent falls  -I discussed I don't think pt is safe to ambulate independently with walker any longer. I have requested that she have assistance with ADLS and ambulating. 4. Confusion, worsening RLE weakness and personality changes per daughter. Given PAF, could certainly be stroke. Would recommend MRI brain for further eval.   - MRI BRAIN WO CONT; Future    5. History of hemorrhagic stroke with residual hemiplegia (HCC)  - MRI BRAIN WO CONT; Future    The plan was discussed with the patient. The patient verbalized understanding and is in agreement with the plan. All medication potential side effects were discussed with the patient. Health Maintenance:   Health Maintenance   Topic Date Due    GLAUCOMA SCREENING Q2Y  07/23/2003    Bone Densitometry (Dexa) Screening  07/23/2003    Pneumococcal 65+ Low/Medium Risk (2 of 2 - PCV13) 02/10/2018    MEDICARE YEARLY EXAM  01/30/2019    DTaP/Tdap/Td series (2 - Td) 12/11/2027    ZOSTER VACCINE AGE 60>  Addressed    Influenza Age 5 to Adult  Addressed       Jayla Frank is a 76 y.o. female and presents with Fall (4/6/18); ED Follow-up; and Follow Up Chronic Condition     Subjective:  Pt continues to have numerous falls  She was seen 4/6 in ER and dx with odontoid fracture. She does have R hemiplegia from previous hemorrhagic stroke and and continues to not use her walker when ambulating to bathroom, etc. Daughter states her R hand clenching and worsening R sided weakness prevent her from using walker. Daughter reports the patient can't control the R leg. PT and OT are working with the patient. She's not wearing the c-collar as prescribed by neurosurgery. Has f/u tomorrow. She states she has no pain in neck.     Pt c/o chest pain, midline with radiation around R breast,  worse with inspiration since her fall 4/6. We did rib xray at the [facility] [place]. Daughter reports worsening confusion. She felt like she was moved in the [facility], although daughter states this is not true. Her daughter also states she's argumentative and has some personality changes. 4/6 CT head did not show significant changes. She does have h/o PAF, not on anticoag b/c of previous hemorrhagic stroke. ROS:  Constitutional: No recent weight change. No weakness/fatigue. No f/c. Cardiovascular: No CP/palpitations. No ALY/orthopnea/PND. Respiratory: No cough/sputum, dyspnea, wheezing. Gastointestinal: No dysphagia, reflux. No n/v. No constipation/diarrhea. No melena/rectal bleeding. Genitourinary: No dysuria, urinary hesitancy, nocturia, hematuria. No incontinence. The problem list was updated as a part of today's visit. 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    Paroxysmal atrial fibrillation (HCC) I48.0    Contraindication to anticoagulation therapy Z53.09    Frequent falls R29.6    Right hand weakness R29.898    Contracture, right hand M24.541    Pure hypercholesterolemia E78.00    Full code status Z78.9    Subclinical hypothyroidism E03.9    OAB (overactive bladder) N32.81    Dysthymia F34.1    Vitamin D deficiency E55.9    First degree AV block I44.0    Chronic cough R05    History of hemorrhagic stroke with residual hemiplegia (HCC) I69.359    History of vertebral compression fracture Z87.81    Centrilobular emphysema (HCC) J43.2    Recurrent depression ([facility]) F33.9    Expressive aphasia R47.01       The PSH, FH were reviewed.     SH:  Social History   Substance Use Topics    Smoking status: Former Smoker     Years: 10.00    Smokeless tobacco: Never Used    Alcohol use 0.6 oz/week     1 Glasses of wine per week Medications/Allergies:  Current Outpatient Prescriptions on File Prior to Visit   Medication Sig Dispense Refill    traMADol (ULTRAM) 50 mg tablet Take 1 Tab by mouth two (2) times daily as needed for Pain. Max Daily Amount: 100 mg. 30 Tab 0    lanolin-mineral oil (THERADERM) oil Apply as needed to affected areas. Patient to apply 420 mL 6    fluticasone-salmeterol (ADVAIR) 250-50 mcg/dose diskus inhaler Take 1 Puff by inhalation two (2) times a day. 3 Inhaler 3    Calcium-Cholecalciferol, D3, (CALCIUM 600 WITH VITAMIN D3) 600 mg(1,500mg) -400 unit cap Take  by mouth. 500 calcium 200 units of vitamin d      senna-docusate (PERICOLACE) 8.6-50 mg per tablet Take 1 Tab by mouth daily. As needed for constipation      mirabegron ER (MYRBETRIQ) 25 mg ER tablet Take 1 Tab by mouth daily. Indications: Bladder Hyperactivity 90 Tab 0    umeclidinium (INCRUSE ELLIPTA) 62.5 mcg/actuation inhaler Take 1 Puff by inhalation daily. Indications: PREVENTION OF BRONCHOSPASMS WITH EMPHYSEMA 30 Inhaler 3    albuterol (PROVENTIL HFA, VENTOLIN HFA, PROAIR HFA) 90 mcg/actuation inhaler Take 2 Puffs by inhalation every four (4) hours as needed for Shortness of Breath (cough). 1 Inhaler 1    escitalopram oxalate (LEXAPRO) 10 mg tablet Take 1 Tab by mouth daily. At 7pm 90 Tab 2    cholecalciferol, vitamin D3, 2,000 unit tab Take  by mouth. No current facility-administered medications on file prior to visit. Allergies   Allergen Reactions    Codeine Itching       Objective:  Visit Vitals    /80 (BP 1 Location: Left arm, BP Patient Position: Sitting)    Pulse 83    Temp 97.8 °F (36.6 °C) (Oral)    Resp 20    Ht 5' 4\" (1.626 m)    Wt 124 lb (56.2 kg)    SpO2 97%    BMI 21.28 kg/m2      Constitutional: Well developed, nourished, no distress, alert   CV: S1, S2.  RRR. No murmurs/rubs. No thrills palpated. No carotid bruits. Intact distal pulses. No edema. Pulm: No abnormalities on inspection. Clear to auscultation bilaterally. No wheezing/rhonchi. Normal effort. MS: Able to extend R hand (no contracture of hand), R hemiparesis   Neuro: Speech chronically dysarthric. Skin: No lesions/rashes on inspection.  + RUE ecchymoses. Psych: Appropriate affect. Impaired judgement and insight. Short-term memory impaired.

## 2018-04-23 NOTE — PATIENT INSTRUCTIONS
Costochondritis: Care Instructions  Your Care Instructions  You have chest pain because the cartilage of your rib cage is inflamed. This problem is called costochondritis. This type of chest wall pain may last from days to weeks. It is not a heart problem. Sometimes costochondritis occurs with a cold or the flu, and other times the exact cause is not known. Follow-up care is a key part of your treatment and safety. Be sure to make and go to all appointments, and call your doctor if you are having problems. It's also a good idea to know your test results and keep a list of the medicines you take. How can you care for yourself at home? · Take medicines for pain and inflammation exactly as directed. ¨ If the doctor gave you a prescription medicine, take it as prescribed. ¨ If you are not taking a prescription pain medicine, ask your doctor if you can take an over-the-counter medicine. ¨ Do not take two or more pain medicines at the same time unless the doctor told you to. Many pain medicines have acetaminophen, which is Tylenol. Too much acetaminophen (Tylenol) can be harmful. · It may help to use a warm compress or heating pad (set on low) on your chest. You can also try alternating heat and ice. Put ice or a cold pack on the area for 10 to 20 minutes at a time. Put a thin cloth between the ice and your skin. · Avoid any activity that strains the chest area. As your pain gets better, you can slowly return to your normal activities. · Do not use tape, an elastic bandage, a \"rib belt,\" or anything else that restricts your chest wall motion. When should you call for help? Call 911 anytime you think you may need emergency care. For example, call if:  ? · You have new or different chest pain or pressure. This may occur with:  ¨ Sweating. ¨ Shortness of breath. ¨ Nausea or vomiting. ¨ Pain that spreads from the chest to the neck, jaw, or one or both shoulders or arms. ¨ Dizziness or lightheadedness.   ¨ A fast or uneven pulse. After calling 911, chew 1 adult-strength aspirin. Wait for an ambulance. Do not try to drive yourself. ? · You have severe trouble breathing. ?Call your doctor now or seek immediate medical care if:  ? · You have a fever or cough. ? · You have any trouble breathing. ? · Your chest pain gets worse. ? Watch closely for changes in your health, and be sure to contact your doctor if:  ? · Your chest pain continues even though you are taking anti-inflammatory medicine. ? · Your chest wall pain has not improved after 5 to 7 days. Where can you learn more? Go to http://estefanía-laila.info/. Enter M117 in the search box to learn more about \"Costochondritis: Care Instructions. \"  Current as of: March 20, 2017  Content Version: 11.4  © 9690-4335 [employer]. Care instructions adapted under license by [employer] (which disclaims liability or warranty for this information). If you have questions about a medical condition or this instruction, always ask your healthcare professional. [address] any warranty or liability for your use of this information.

## 2018-05-16 PROBLEM — F34.1 DYSTHYMIA: Status: RESOLVED | Noted: 2017-01-27 | Resolved: 2018-01-01

## 2018-05-16 NOTE — MR AVS SNAPSHOT
[address] [address] [facility] [id number] [phone] Patient: Maryam Jones MRN: [id number] FZH:[id number] Visit Information Date & Time Provider Department Dept. Phone Encounter #  
 5/16/2018 11:15 AM Rip ChristiansonOmar [phone] [id number] Follow-up Instructions Return in about 1 month (around 6/16/2018). Follow-up and Disposition History Your Appointments 7/23/2018  1:30 PM  
Follow Up with Whit Navas MD  
Cardio Specialist at [address]) Appt Note: 6 months [id number] [facility] [address] [place] [id number] [address]  
  
   
 [id number] [place] [address] Upcoming Health Maintenance Date Due  
 GLAUCOMA SCREENING Q2Y 7/23/2003 Bone Densitometry (Dexa) Screening 7/23/2003 Pneumococcal 65+ Low/Medium Risk (2 of 2 - PCV13) 2/10/2018 Influenza Age 5 to Adult 8/1/2018 MEDICARE YEARLY EXAM 1/30/2019 DTaP/Tdap/Td series (2 - Td) 12/11/2027 Allergies as of 5/16/2018  Review Complete On: 5/16/2018 By: Rip Christianson MD  
  
 Severity Noted Reaction Type Reactions Codeine  10/20/2016    Itching Current Immunizations  Never Reviewed Name Date Influenza High Dose Vaccine PF 9/18/2017  1:45 PM  
 Influenza Vaccine 11/11/2017 12:00 AM  
 Pneumococcal Polysaccharide (PPSV-23) 2/10/2017 12:00 AM, 11/14/2016 Tdap 12/11/2017 12:00 AM, 2/10/2017 12:00 AM  
  
 Not reviewed this visit You Were Diagnosed With   
  
 Codes Comments [facility]    -  Primary ICD-10-CM: F33.9 ICD-9-CM: 296.30 Contracture, right hand     ICD-10-CM: M24.541 ICD-9-CM: 36.43   
 CVA, old, dysarthria     ICD-10-CM: P23.782 ICD-9-CM: 438.13 Vitals BP Pulse Temp Resp Height(growth percentile) Weight(growth percentile) 120/72 (BP 1 Location: Left arm, BP Patient Position: Sitting) 86 98.7 °F (37.1 °C) (Oral) 20 5' 4\" (1.626 m) 124 lb (56.2 kg) SpO2 BMI OB Status Smoking Status 96% 21.28 kg/m2 Hysterectomy Former Smoker Vitals History BMI and BSA Data Body Mass Index Body Surface Area  
 21.28 kg/m 2 1.59 m 2 Preferred Pharmacy Pharmacy Name Phone  [address] [phone] Your Updated Medication List  
  
   
This list is accurate as of 5/16/18 11:43 AM.  Always use your most recent med list.  
  
  
  
  
 albuterol 90 mcg/actuation inhaler Commonly known as:  PROVENTIL HFA, VENTOLIN HFA, PROAIR HFA Take 2 Puffs by inhalation every four (4) hours as needed for Shortness of Breath (cough). CALCIUM 600 WITH VITAMIN D3 600 mg(1,500mg) -400 unit Cap Generic drug:  Calcium-Cholecalciferol (D3) Take  by mouth. 500 calcium 200 units of vitamin d  
  
 cholecalciferol (vitamin D3) 2,000 unit Tab Take  by mouth.  
  
 escitalopram oxalate 20 mg tablet Commonly known as:  Vallorie Primmer Take 1 Tab by mouth daily. At 7pm  
  
 fluticasone-salmeterol 250-50 mcg/dose diskus inhaler Commonly known as:  ADVAIR Take 1 Puff by inhalation two (2) times a day. lanolin-mineral oil Oil Commonly known as:  Karlynn Gey Apply as needed to affected areas. Patient to apply  
  
 mirabegron ER 25 mg ER tablet Commonly known as:  MYRBETRIQ Take 1 Tab by mouth daily. Indications: Bladder Hyperactivity  
  
 senna-docusate 8.6-50 mg per tablet Commonly known as:  Norah Chalk Take 1 Tab by mouth daily. As needed for constipation  
  
 traMADol 50 mg tablet Commonly known as:  ULTRAM  
Take 1 Tab by mouth two (2) times daily as needed for Pain. Max Daily Amount: 100 mg.  
  
 umeclidinium 62.5 mcg/actuation inhaler Commonly known as:  INCRUSE ELLIPTA Take 1 Puff by inhalation daily. Indications: PREVENTION OF BRONCHOSPASMS WITH EMPHYSEMA Prescriptions Sent to Pharmacy Refills  
 escitalopram oxalate (LEXAPRO) 20 mg tablet 1 Sig: Take 1 Tab by mouth daily. At 7pm  
 Class: Normal  
 Pharmacy: [facility] [address] [place] [address] [place] [facility] #: [phone] Route: Oral  
  
We Performed the Following REFERRAL TO OCCUPATIONAL THERAPY [REF53 Custom] Comments:  
 [facility]-  eval for nighttime splinting of R hand REFERRAL TO SPEECH THERAPY [GQC155 Custom] Comments:  
 [facility] Follow-up Instructions Return in about 1 month (around 6/16/2018). Referral Information Referral ID Referred By Referred To  
  
 [id number] Tin Edwards V Not Available Visits Status Start Date End Date 1 New Request 5/16/18 5/16/19 If your referral has a status of pending review or denied, additional information will be sent to support the outcome of this decision. Referral ID Referred By Referred To  
 [id number] Tin Edwards V Not Available Visits Status Start Date End Date 1 New Request 5/16/18 5/16/19 If your referral has a status of pending review or denied, additional information will be sent to support the outcome of this decision. Patient Instructions Depression and Chronic Disease: Care Instructions Your Care Instructions A chronic disease is one that you have for a long time. Some chronic diseases can be controlled, but they usually cannot be cured. Depression is common in people with chronic diseases, but it often goes unnoticed. Many people have concerns about seeking treatment for a mental health problem. You may think it's a sign of weakness, or you don't want people to know about it. It's important to overcome these reasons for not seeking treatment. Treating depression or anxiety is good for your health. Follow-up care is a key part of your treatment and safety.  Be sure to make and go to all appointments, and call your doctor if you are having problems. It's also a good idea to know your test results and keep a list of the medicines you take. How can you care for yourself at home? Watch for symptoms of depression The symptoms of depression are often subtle at first. You may think they are caused by your disease rather than depression. Or you may think it is normal to be depressed when you have a chronic disease. If you are depressed you may: · Feel sad or hopeless. · Feel guilty or worthless. · Not enjoy the things you used to enjoy. · Feel hopeless, as though life is not worth living. · Have trouble thinking or remembering. · Have low energy, and you may not eat or sleep well. · Pull away from others. · Think often about death or killing yourself. (Keep the numbers for these national suicide hotlines: 9-439-219-TALK [1-289.586.3664] and 3-808-AEQBISP [1-508.912.8082]. ) Get treatment By treating your depression, you can feel more hopeful and have more energy. If you feel better, you may take better care of yourself, so your health may improve. · Talk to your doctor if you have any changes in mood during treatment for your disease. · Ask your doctor for help. Counseling, antidepressant medicine, or a combination of the two can help most people with depression. Often a combination works best. Counseling can also help you cope with having a chronic disease. When should you call for help? Call 911 anytime you think you may need emergency care. For example, call if: 
? · You feel like hurting yourself or someone else. ? · Someone you know has depression and is about to attempt or is attempting suicide. ?Call your doctor now or seek immediate medical care if: 
? · You hear voices. ? · Someone you know has depression and: 
¨ Starts to give away his or her possessions. ¨ Uses illegal drugs or drinks alcohol heavily. ¨ Talks or writes about death, including writing suicide notes or talking about guns, knives, or pills. ¨ Starts to spend a lot of time alone. ¨ Acts very aggressively or suddenly appears calm. ? Watch closely for changes in your health, and be sure to contact your doctor if: 
? · You do not get better as expected. Where can you learn more? Go to http://estefanía-laila.info/. Enter Z799 in the search box to learn more about \"Depression and Chronic Disease: Care Instructions. \" Current as of: May 12, 2017 Content Version: 11.4 © 6537-4239 [employer]. Care instructions adapted under license by [employer] (which disclaims liability or warranty for this information). If you have questions about a medical condition or this instruction, always ask your healthcare professional. [address] any warranty or liability for your use of this information. Introducing [facility]! [place] introduces Storyz patient portal. Now you can access parts of your medical record, email your doctor's office, and request medication refills online. 1. In your internet browser, go to [email or web address] 2. Click on the First Time User? Click Here link in the Sign In box. You will see the New Member Sign Up page. 3. Enter your Storyz Access Code exactly as it appears below. You will not need to use this code after youve completed the sign-up process. If you do not sign up before the expiration date, you must request a new code. · Storyz Access Code: [email or web address] Expires: 7/2/2018  3:08 PM 
 
4. Enter the last four digits of your Social Security Number (xxxx) and Date of Birth (mm/dd/yyyy) as indicated and click Submit. You will be taken to the next sign-up page. 5. Create a Storyz ID. This will be your Storyz login ID and cannot be changed, so think of one that is secure and easy to remember. 6. Create a Well[employer] password. You can change your password at any time. 7. Enter your Password Reset Question and Answer. This can be used at a later time if you forget your password. 8. Enter your e-mail address. You will receive e-mail notification when new information is available in [address] 9. Click Sign Up. You can now view and download portions of your medical record. 10. Click the Download Summary menu link to download a portable copy of your medical information. If you have questions, please visit the Frequently Asked Questions section of the [employer] website. Remember, [employer] is NOT to be used for urgent needs. For medical emergencies, dial 911. Now available from your iPhone and Android! Please provide this summary of care documentation to your next provider. Your primary care clinician is listed as Perla 13. If you have any questions after today's visit, please call [phone].

## 2018-05-16 NOTE — PATIENT INSTRUCTIONS
Depression and Chronic Disease: Care Instructions  Your Care Instructions    A chronic disease is one that you have for a long time. Some chronic diseases can be controlled, but they usually cannot be cured. Depression is common in people with chronic diseases, but it often goes unnoticed. Many people have concerns about seeking treatment for a mental health problem. You may think it's a sign of weakness, or you don't want people to know about it. It's important to overcome these reasons for not seeking treatment. Treating depression or anxiety is good for your health. Follow-up care is a key part of your treatment and safety. Be sure to make and go to all appointments, and call your doctor if you are having problems. It's also a good idea to know your test results and keep a list of the medicines you take. How can you care for yourself at home? Watch for symptoms of depression  The symptoms of depression are often subtle at first. You may think they are caused by your disease rather than depression. Or you may think it is normal to be depressed when you have a chronic disease. If you are depressed you may:  · Feel sad or hopeless. · Feel guilty or worthless. · Not enjoy the things you used to enjoy. · Feel hopeless, as though life is not worth living. · Have trouble thinking or remembering. · Have low energy, and you may not eat or sleep well. · Pull away from others. · Think often about death or killing yourself. (Keep the numbers for these national suicide hotlines: 6-024-994-TALK [1-302.989.6552] and 0-640-ZAHITKF [1-281.433.7751]. )  Get treatment  By treating your depression, you can feel more hopeful and have more energy. If you feel better, you may take better care of yourself, so your health may improve. · Talk to your doctor if you have any changes in mood during treatment for your disease. · Ask your doctor for help.  Counseling, antidepressant medicine, or a combination of the two can help most people with depression. Often a combination works best. Counseling can also help you cope with having a chronic disease. When should you call for help? Call 911 anytime you think you may need emergency care. For example, call if:  ? · You feel like hurting yourself or someone else. ? · Someone you know has depression and is about to attempt or is attempting suicide. ?Call your doctor now or seek immediate medical care if:  ? · You hear voices. ? · Someone you know has depression and:  ¨ Starts to give away his or her possessions. ¨ Uses illegal drugs or drinks alcohol heavily. ¨ Talks or writes about death, including writing suicide notes or talking about guns, knives, or pills. ¨ Starts to spend a lot of time alone. ¨ Acts very aggressively or suddenly appears calm. ? Watch closely for changes in your health, and be sure to contact your doctor if:  ? · You do not get better as expected. Where can you learn more? Go to http://estefanía-laila.info/. Enter U887 in the search box to learn more about \"Depression and Chronic Disease: Care Instructions. \"  Current as of: May 12, 2017  Content Version: 11.4  © 4382-0700 [employer], [employer]. Care instructions adapted under license by [employer] (which disclaims liability or warranty for this information). If you have questions about a medical condition or this instruction, always ask your healthcare professional. [place] [address] any warranty or liability for your use of this information.

## 2018-05-16 NOTE — PROGRESS NOTES
Assessment/Plan:    1. Recurrent depression ([address]  -incr dose. F/u in 1mo  - escitalopram oxalate (LEXAPRO) 20 mg tablet; Take 1 Tab by mouth daily. At 7pm  Dispense: 90 Tab; Refill: 1    2. Contracture, right hand  -evaluate for splint for nighttime use of R hand  - REFERRAL TO OCCUPATIONAL THERAPY    3. CVA, old, dysarthria  - REFERRAL TO SPEECH THERAPY    The plan was discussed with the patient. The patient verbalized understanding and is in agreement with the plan. All medication potential side effects were discussed with the patient. Health Maintenance:   Health Maintenance   Topic Date Due    GLAUCOMA SCREENING Q2Y  07/23/2003    Bone Densitometry (Dexa) Screening  07/23/2003    Pneumococcal 65+ Low/Medium Risk (2 of 2 - PCV13) 02/10/2018    Influenza Age 9 to Adult  08/01/2018    MEDICARE YEARLY EXAM  01/30/2019    DTaP/Tdap/Td series (2 - Td) 12/11/2027    ZOSTER VACCINE AGE 60>  Addressed     Caleb Breen is a 76 y.o. female and presents with Depression and Follow Up Chronic Condition     Subjective:  Depression - daughter notes worsening depression. She is on lexapro. She cries at times b/c she \"isn't where she wants to be\". Her daughter reports she has been staying in her room, is not going out to eat meals. Her daughter reports very negative thinking. Pt repeatedly voices \"wanting to go home\". She states she misses her friends. Her daughter reports that she has cried every day. Unable to really assess with PHQ9 b/c pt has difficulty directly answering questions. Daughter reports she focuses a lot on her current situation, what's happened. Her daughter states \"she's miserable\". She is refusing EEG and MRI- we had recommended this due to syncopal episode. She has had less falls since recommending she not ambulate. She also hasn't f/u on odontoid fracture. Daughter also notes worsening contractures of R hand and foot. ROS:  Constitutional: No recent weight change.  + generalized weakness. No f/c. Cardiovascular: No CP/palpitations. No ALY/orthopnea/PND. Respiratory: No cough/sputum, dyspnea, wheezing. Neurological: No seizures/numbness/weakness. Psychiatric:  + depression, no anxiety. The problem list was updated as a part of today's visit. 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    Paroxysmal atrial fibrillation (HCC) I48.0    Contraindication to anticoagulation therapy Z53.09    Frequent falls R29.6    Right hand weakness R29.898    Contracture, right hand M24.541    Pure hypercholesterolemia E78.00    Full code status Z78.9    Subclinical hypothyroidism E03.9    OAB (overactive bladder) N32.81    Dysthymia F34.1    Vitamin D deficiency E55.9    First degree AV block I44.0    Chronic cough R05    [facility] of hemorrhagic [facility]ipleg[facility] I69.359    History of vertebral compression fracture Z87.81    Centrilobular emphysema ([facility]) J43.2    Recurrent depression ([facility]) F33.9    Expressive aphasia R47.01       The PSH, FH were reviewed. SH:  Social History   Substance Use Topics    Smoking status: Former Smoker     Years: 10.00    Smokeless tobacco: Never Used    Alcohol use 0.6 oz/week     1 Glasses of wine per week       Medications/Allergies:  Current Outpatient Prescriptions on File Prior to Visit   Medication Sig Dispense Refill    umeclidinium (INCRUSE ELLIPTA) 62.5 mcg/actuation inhaler Take 1 Puff by inhalation daily. Indications: PREVENTION OF BRONCHOSPASMS WITH EMPHYSEMA 30 Inhaler 3    fluticasone-salmeterol (ADVAIR) 250-50 mcg/dose diskus inhaler Take 1 Puff by inhalation two (2) times a day. 3 Inhaler 3    traMADol (ULTRAM) 50 mg tablet Take 1 Tab by mouth two (2) times daily as needed for Pain. Max Daily Amount: 100 mg. 30 Tab 0    lanolin-mineral oil (THERADERM) oil Apply as needed to affected areas.   Patient to apply 420 mL 6    Calcium-Cholecalciferol, D3, (CALCIUM 600 WITH VITAMIN D3) 600 mg(1,500mg) -400 unit cap Take  by mouth. 500 calcium 200 units of vitamin d      senna-docusate (PERICOLACE) 8.6-50 mg per tablet Take 1 Tab by mouth daily. As needed for constipation      mirabegron ER (MYRBETRIQ) 25 mg ER tablet Take 1 Tab by mouth daily. Indications: Bladder Hyperactivity 90 Tab 0    albuterol (PROVENTIL HFA, VENTOLIN HFA, PROAIR HFA) 90 mcg/actuation inhaler Take 2 Puffs by inhalation every four (4) hours as needed for Shortness of Breath (cough). 1 Inhaler 1    escitalopram oxalate (LEXAPRO) 10 mg tablet Take 1 Tab by mouth daily. At 7pm 90 Tab 2    cholecalciferol, vitamin D3, 2,000 unit tab Take  by mouth. No current facility-administered medications on file prior to visit. Allergies   Allergen Reactions    Codeine Itching       Objective:  Visit Vitals    /72 (BP 1 Location: Left arm, BP Patient Position: Sitting)    Pulse 86    Temp 98.7 °F (37.1 °C) (Oral)    Resp 20    Ht 5' 4\" (1.626 m)    Wt 124 lb (56.2 kg)    SpO2 96%    BMI 21.28 kg/m2      Constitutional: Well developed, nourished, no distress, alert   CV: S1, S2.  RRR. No murmurs/rubs. No thrills palpated. No carotid bruits. Intact distal pulses. No edema. Pulm: No abnormalities on inspection. Clear to auscultation bilaterally. No wheezing/rhonchi. Normal effort. Neuro: A/O x 3. Speech dysarthric   Psych: Appropriate affect. Short-term memory intact.

## 2018-05-16 NOTE — PROGRESS NOTES
Solo Quinn is a 76 y.o. female (: 1940) presenting to address:    Chief Complaint   Patient presents with    Depression    Follow Up Chronic Condition       Vitals:    18 1111   BP: 120/72   Pulse: 86   Resp: 20   Temp: 98.7 °F (37.1 °C)   TempSrc: Oral   SpO2: 96%   Weight: 124 lb (56.2 kg)   Height: 5' 4\" (1.626 m)   PainSc:   0 - No pain     Learning Assessment:     Learning Assessment 10/20/2016   PRIMARY LEARNER Patient   PRIMARY LANGUAGE ENGLISH   LEARNER PREFERENCE PRIMARY DEMONSTRATION   ANSWERED BY patient   RELATIONSHIP SELF     Depression Screening:     PHQ over the last two weeks 2018   Little interest or pleasure in doing things Not at all   Feeling down, depressed or hopeless Not at all   Total Score PHQ 2 0     Fall Risk Assessment:     Fall Risk Assessment, last 12 mths 4/3/2018   Able to walk? Yes   Fall in past 12 months? Yes   Fall with injury? Yes   Number of falls in past 12 months 5   Fall Risk Score 6     Abuse Screening:     Abuse Screening Questionnaire 2018   Do you ever feel afraid of your partner? N   Are you in a relationship with someone who physically or mentally threatens you? N   Is it safe for you to go home? Y     Coordination of Care Questionaire:   1. Have you been to the ER, urgent care clinic since your last visit? Hospitalized since your last visit? NO    2. Have you seen or consulted any other health care providers outside of the [address] [place] [address] since your last visit? Include any pap smears or colon screening. YES. Neurology 18    Advanced Directive:   1. Do you have an Advanced Directive? YES    2. Would you like information on Advanced Directives?  NO

## 2018-05-18 NOTE — TELEPHONE ENCOUNTER
Left daughter Andrei Elkins detailed message on self identified VM and [facility]

## 2018-05-18 NOTE — TELEPHONE ENCOUNTER
Tell pt's daughter that I can't incr lexapro as we discussed b/c of an interaction. I'm going to switch it to zoloft to help with her mood.

## 2018-05-22 NOTE — TELEPHONE ENCOUNTER
Patient's daughter Hyacinth Kind called. She is asking if there is any reason the patient couldn't take an airplane trip 3-5 hours to [place] soon. I asked if she is asking due to anxiety over flying and she states no, just for her physical health. She has not flown since her head injury per daughter. Please advise.

## 2018-06-04 NOTE — PROGRESS NOTES
.  Hospital Discharge Follow-Up      Date/Time:  6/4/2018 9:28 AM  Patient was admitted to THE [facility] on 5/22/18 and discharged on 5/26/18 for Pneumonia and AFIB. The physician discharge summary was available at the time of outreach.  NN attempted to reach patient or emergency contact Mrs Miles Connelly , for the second time a Message left for return call    PCP/Specialist follow up: Future Appointments  Date Time Provider Jeremiah Ricketts   6/18/2018 11:00 AM Adrián Lawson MD [facility] [place]   7/23/2018 1:30 PM Kendrick Chang MD [address] [place] [address]          Goals     None

## 2018-06-07 NOTE — TELEPHONE ENCOUNTER
Blas Spears RN called stating they are picking her back up for [facility] and ST after her last hospitalization. They will be sending over orders to sign for ST, her speech is more slurred than previous.

## 2018-06-08 NOTE — PROGRESS NOTES
Assessment/Plan:    1. Pneumonia of both lower lobes due to infectious organism [facility])  -resolved. 2. Recurrent depression ([address]  -incr dose. F/u in 1mo  - sertraline (ZOLOFT) 100 mg tablet; Take 1 Tab by mouth every evening. At 7pm  Dispense: 90 Tab; Refill: 1    Would like pt evaluated for power eval.    The plan was discussed with the patient. The patient verbalized understanding and is in agreement with the plan. All medication potential side effects were discussed with the patient. Health Maintenance:   Health Maintenance   Topic Date Due    GLAUCOMA SCREENING Q2Y  07/23/2003    Bone Densitometry (Dexa) Screening  07/23/2003    Pneumococcal 65+ Low/Medium Risk (2 of 2 - PCV13) 02/10/2018    Influenza Age 9 to Adult  08/01/2018    MEDICARE YEARLY EXAM  01/30/2019    DTaP/Tdap/Td series (2 - Td) 12/11/2027    ZOSTER VACCINE AGE 60>  Addressed       Odilia Ferrell is a 76 y.o. female and presents with Hospital Follow Up     Subjective:  Pt was recently hospitalized for pneumonia. Slowing improving. Had swallow eval 9/2017, which didn't show aspiration. She had been laying around in bed b/c of a fall and pain. She's a lot weaker since her hospitalization. She was also in afib on this admission (not anticoag due to previous hemorrhagic stroke). She was recently changed from lexapro to zoloft b/c of interactions. She still is a little tearful at times. Daughter notes during the hospitalization, she was very agitated/hostile/aggressive. Her daughter notes she was reclusive in her room for the past 2 mos. ROS:  Constitutional: No recent weight change. No weakness/fatigue. No f/c. Cardiovascular: No CP/palpitations. No ALY/orthopnea/PND. Respiratory: No cough/sputum, dyspnea, wheezing. Gastointestinal: No dysphagia, reflux. No n/v. No constipation/diarrhea. No melena/rectal bleeding. Genitourinary: No dysuria, urinary hesitancy, nocturia, hematuria. No incontinence. Psychiatric:  No depression, anxiety. The problem list was updated as a part of today's visit. 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    Paroxysmal atrial fibrillation (HCC) I48.0    Contraindication to anticoagulation therapy Z53.09    Frequent falls R29.6    Right hand weakness R29.898    Contracture, right hand M24.541    Pure hypercholesterolemia E78.00    Full code status Z78.9    Subclinical hypothyroidism E03.9    OAB (overactive bladder) N32.81    Vitamin D deficiency E55.9    First degree AV block I44.0    Chronic cough R05    History of hemorrhagic stroke with residual hemiplegia ([facility]) I69.359    History of vertebral compression fracture Z87.81    Centrilobular emphysema ([facility]) J43.2    Recurrent depression ([facility]) F33.9    Expressive aphasia R47.01       The PSH, FH were reviewed. SH:  Social History   Substance Use Topics    Smoking status: Former Smoker     Years: 10.00    Smokeless tobacco: Never Used    Alcohol use 0.6 oz/week     1 Glasses of wine per week       Medications/Allergies:  Current Outpatient Prescriptions on File Prior to Visit   Medication Sig Dispense Refill    sertraline (ZOLOFT) 50 mg tablet Take 1 Tab by mouth every evening. At 7pm 90 Tab 1    umeclidinium (INCRUSE ELLIPTA) 62.5 mcg/actuation inhaler Take 1 Puff by inhalation daily. Indications: PREVENTION OF BRONCHOSPASMS WITH EMPHYSEMA 30 Inhaler 3    fluticasone-salmeterol (ADVAIR) 250-50 mcg/dose diskus inhaler Take 1 Puff by inhalation two (2) times a day. 3 Inhaler 3    traMADol (ULTRAM) 50 mg tablet Take 1 Tab by mouth two (2) times daily as needed for Pain. Max Daily Amount: 100 mg. 30 Tab 0    lanolin-mineral oil (THERADERM) oil Apply as needed to affected areas. Patient to apply 420 mL 6    Calcium-Cholecalciferol, D3, (CALCIUM 600 WITH VITAMIN D3) 600 mg(1,500mg) -400 unit cap Take  by mouth.  [address] calcium 200 units of vitamin d      senna-docusate (PERICOLACE) 8.6-50 mg per tablet Take 1 Tab by mouth daily. As needed for constipation      mirabegron ER (MYRBETRIQ) 25 mg ER tablet Take 1 Tab by mouth daily. Indications: Bladder Hyperactivity 90 Tab 0    albuterol (PROVENTIL HFA, VENTOLIN HFA, PROAIR HFA) 90 mcg/actuation inhaler Take 2 Puffs by inhalation every four (4) hours as needed for Shortness of Breath (cough). 1 Inhaler 1    cholecalciferol, vitamin D3, 2,000 unit tab Take  by mouth. No current facility-administered medications on file prior to visit. Allergies   Allergen Reactions    Codeine Itching       Objective:  Visit Vitals    /62 (BP 1 Location: Left arm, BP Patient Position: Sitting)    Pulse 72    Temp 97.8 °F (36.6 °C) (Oral)    Resp 18    Ht 5' 4\" (1.626 m)    Wt 119 lb (54 kg)    SpO2 99%    BMI 20.43 kg/m2      Constitutional: Well developed, nourished, no distress, alert   CV: S1, S2.  RRR. No murmurs/rubs. No thrills palpated. No carotid bruits. Intact distal pulses. No edema. Pulm: No abnormalities on inspection. Clear to auscultation bilaterally. No wheezing/rhonchi. Normal effort. Psych: Appropriate affect, judgement and insight. Short-term memory intact.

## 2018-06-08 NOTE — PROGRESS NOTES
Pt needs a power wc eval. Called [employer]. They said to send OT eval for power w/c and the rep from [facility] would call . Called [facility]. They said they would send over the notes needed to sign off on w/c eval if today's visit was for eval for w/c.

## 2018-06-08 NOTE — PROGRESS NOTES
Cristóbal Green is a 76 y.o. female (: 1940) presenting to address:    Chief Complaint   Patient presents with   [facility] Follow Up       Vitals:    18 1347   BP: 120/62   Pulse: 72   Resp: 18   Temp: 97.8 °F (36.6 °C)   TempSrc: Oral   SpO2: 99%   Weight: 119 lb (54 kg)   Height: 5' 4\" (1.626 m)   PainSc:   1   PainLoc: Leg       Learning Assessment:     Learning Assessment 10/20/2016   PRIMARY LEARNER Patient   PRIMARY LANGUAGE ENGLISH   LEARNER PREFERENCE PRIMARY DEMONSTRATION   ANSWERED BY patient   RELATIONSHIP SELF     Depression Screening:     PHQ over the last two weeks 2018   Little interest or pleasure in doing things Not at all   Feeling down, depressed or hopeless Not at all   Total Score PHQ 2 0     Fall Risk Assessment:     Fall Risk Assessment, last 12 mths 4/3/2018   Able to walk? Yes   Fall in past 12 months? Yes   Fall with injury? Yes   Number of falls in past 12 months 5   Fall Risk Score 6     Abuse Screening:     Abuse Screening Questionnaire 2018   Do you ever feel afraid of your partner? N   Are you in a relationship with someone who physically or mentally threatens you? N   Is it safe for you to go home? Y     Coordination of Care Questionaire:   1. Have you been to the ER, urgent care clinic since your last visit? Hospitalized since your last visit? YES [facility] 18    2. Have you seen or consulted any other health care providers outside of the [address] [place] [address] since your last visit? Include any pap smears or colon screening. NO    Advanced Directive:   1. Do you have an Advanced Directive? YES    2. Would you like information on Advanced Directives?  NO

## 2018-06-08 NOTE — MR AVS SNAPSHOT
[facility] 
 
 
 [address] [facility] [id number] [phone] Patient: James Paz MRN: [id number] [id number] Visit Information Date & Time Provider Department Dept. Phone Encounter #  
 6/8/2018  2:00 PM Tej Dewitt [employer] [id number] Your Appointments 6/18/2018 11:00 AM  
Follow Up with Tej Dewitt MD  
[employer] [address] [place] [address]) Appt Note: 1 month f/u appt [address] [facility] [id number] [phone]  
  
   
 [address] 7/23/2018  1:30 PM  
Follow Up with Yessica Lucero MD  
Cardio Specialist at [address]) Appt Note: 6 months [address] [facility] [address]  
  
   
 [address] [facility] [address] Upcoming Health Maintenance Date Due  
 GLAUCOMA SCREENING Q2Y 7/23/2003 Bone Densitometry (Dexa) Screening 7/23/2003 Pneumococcal 65+ Low/Medium Risk (2 of 2 - PCV13) 2/10/2018 Influenza Age 5 to Adult 8/1/2018 MEDICARE YEARLY EXAM 1/30/2019 DTaP/Tdap/Td series (2 - Td) 12/11/2027 Allergies as of 6/8/2018  Review Complete On: 6/8/2018 By: Cintia Bautista Severity Noted Reaction Type Reactions Codeine  10/20/2016    Itching Current Immunizations  Never Reviewed Name Date Influenza High Dose Vaccine PF 9/18/2017  1:45 PM  
 Influenza Vaccine 11/11/2017 12:00 AM  
 Pneumococcal Polysaccharide (PPSV-23) 2/10/2017 12:00 AM, 11/14/2016 Tdap 12/11/2017 12:00 AM, 2/10/2017 12:00 AM  
  
 Not reviewed this visit Vitals BP Pulse Temp Resp Height(growth percentile) Weight(growth percentile) 120/62 (BP 1 Location: Left arm, BP Patient Position: Sitting) 72 97.8 °F (36.6 °C) (Oral) 18 5' 4\" (1.626 m) 119 lb (54 kg) SpO2 BMI OB Status Smoking Status 99% 20.43 kg/m2 Hysterectomy Former Smoker Vitals History BMI and BSA Data Body Mass Index Body Surface Area  
 20.43 kg/m 2 1.56 m 2 Preferred Pharmacy Pharmacy Name Phone  [address] [phone] Your Updated Medication List  
  
   
This list is accurate as of 6/8/18  2:28 PM.  Always use your most recent med list.  
  
  
  
  
 albuterol 90 mcg/actuation inhaler Commonly known as:  PROVENTIL HFA, VENTOLIN HFA, PROAIR HFA Take 2 Puffs by inhalation every four (4) hours as needed for Shortness of Breath (cough). CALCIUM 600 WITH VITAMIN D3 600 mg(1,500mg) -400 unit Cap Generic drug:  Calcium-Cholecalciferol (D3) Take  by mouth. 500 calcium 200 units of vitamin d  
  
 cholecalciferol (vitamin D3) 2,000 unit Tab Take  by mouth. fluticasone-salmeterol 250-50 mcg/dose diskus inhaler Commonly known as:  ADVAIR Take 1 Puff by inhalation two (2) times a day. lanolin-mineral oil Oil Commonly known as:  Linward Livingston Apply as needed to affected areas. Patient to apply  
  
 mirabegron ER 25 mg ER tablet Commonly known as:  MYRBETRIQ Take 1 Tab by mouth daily. Indications: Bladder Hyperactivity  
  
 senna-docusate 8.6-50 mg per tablet Commonly known as:  Jorene Calk Take 1 Tab by mouth daily. As needed for constipation  
  
 sertraline 100 mg tablet Commonly known as:  ZOLOFT Take 1 Tab by mouth every evening. At 7pm  
  
 traMADol 50 mg tablet Commonly known as:  ULTRAM  
Take 1 Tab by mouth two (2) times daily as needed for Pain. Max Daily Amount: 100 mg.  
  
 umeclidinium 62.5 mcg/actuation inhaler Commonly known as:  INCRUSE ELLIPTA Take 1 Puff by inhalation daily. Indications: PREVENTION OF BRONCHOSPASMS WITH EMPHYSEMA Prescriptions Sent to Pharmacy  Refills  
 sertraline (ZOLOFT) 100 mg tablet 1  
 Sig: Take 1 Tab by mouth every evening. At 7pm  
 Class: Normal  
 Pharmacy: [address] [address] [facility] #: [phone] Route: Oral  
  
Introducing [facility]! [facility] introduces Echo360 patient portal. Now you can access parts of your medical record, email your doctor's office, and request medication refills online. 1. In your internet browser, go to [email or web address] 2. Click on the First Time User? Click Here link in the Sign In box. You will see the New Member Sign Up page. 3. Enter your Echo360 Access Code exactly as it appears below. You will not need to use this code after youve completed the sign-up process. If you do not sign up before the expiration date, you must request a new code. · Echo360 Access Code: [email or web address] Expires: 7/2/2018  3:08 PM 
 
4. Enter the last four digits of your Social Security Number (xxxx) and Date of Birth (mm/dd/yyyy) as indicated and click Submit. You will be taken to the next sign-up page. 5. Create a Echo360 ID. This will be your Echo360 login ID and cannot be changed, so think of one that is secure and easy to remember. 6. Create a Echo[employer] password. You can change your password at any time. 7. Enter your Password Reset Question and Answer. This can be used at a later time if you forget your password. 8. Enter your e-mail address. You will receive e-mail notification when new information is available in [address]. 9. Click Sign Up. You can now view and download portions of your medical record. 10. Click the Download Summary menu link to download a portable copy of your medical information. If you have questions, please visit the Frequently Asked Questions section of the Echo360 website. Remember, Echo360 is NOT to be used for urgent needs. For medical emergencies, dial 911. Now available from your iPhone and Android! Please provide this summary of care documentation to your next provider. Your primary care clinician is listed as Perla Basurto. If you have any questions after today's visit, please call [phone].

## 2018-06-08 NOTE — PROGRESS NOTES
Assessment/Plan:    1. Impaired mobility due to h/o hemorrhagic stroke with hemiparesis, R hand weakness and contracture  Refer to [facility] for functional mobility eval.  Needs power wheelchair/scooter  - REFERRAL TO OCCUPATIONAL THERAPY    The plan was discussed with the patient. The patient verbalized understanding and is in agreement with the plan. All medication potential side effects were discussed with the patient. Health Maintenance:   Health Maintenance   Topic Date Due    GLAUCOMA SCREENING Q2Y  07/23/2003    Bone Densitometry (Dexa) Screening  07/23/2003    Pneumococcal 65+ Low/Medium Risk (2 of 2 - PCV13) 02/10/2018    Influenza Age 9 to Adult  08/01/2018    MEDICARE YEARLY EXAM  01/30/2019    DTaP/Tdap/Td series (2 - Td) 12/11/2027    ZOSTER VACCINE AGE 60>  Addressed       Trey Medel is a 76 y.o. female and presents with Hospital Follow Up     Subjective:  Pt is here for impaired mobility. She has h/o hemorrhagic stroke with R hemiparesis. [facility] had [facility]. She no longer uses a walker and primarily is wheelchair bound. However, she is unable to adequately use the brakes or self-propel due to weakness and hemiparesis. ROS:  Constitutional: No recent weight change. No weakness/fatigue. No f/c. The problem list was updated as a part of today's visit.   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    Paroxysmal atrial fibrillation (HCC) I48.0    Contraindication to anticoagulation therapy Z53.09    Frequent falls R29.6    Right hand weakness R29.898    Contracture, right hand M24.541    Pure hypercholesterolemia E78.00    Full code status Z78.9    Subclinical hypothyroidism E03.9    OAB (overactive bladder) N32.81    Vitamin D deficiency E55.9    First degree AV block I44.0    Chronic cough R05    History of hemorrhagic stroke with residual hemiplegia ([facility]) I69.359    History of vertebral compression fracture Z87.81    Centrilobular emphysema ([facility]) J43.2    Recurrent depression ([facility]) F33.9    Expressive aphasia R47.01       The PSH, FH were reviewed. SH:  Social History   Substance Use Topics    Smoking status: Former Smoker     Years: 10.00    Smokeless tobacco: Never Used    Alcohol use 0.6 oz/week     1 Glasses of wine per week       Medications/Allergies:  Current Outpatient Prescriptions on File Prior to Visit   Medication Sig Dispense Refill    umeclidinium (INCRUSE ELLIPTA) 62.5 mcg/actuation inhaler Take 1 Puff by inhalation daily. Indications: PREVENTION OF BRONCHOSPASMS WITH EMPHYSEMA 30 Inhaler 3    fluticasone-salmeterol (ADVAIR) 250-50 mcg/dose diskus inhaler Take 1 Puff by inhalation two (2) times a day. 3 Inhaler 3    traMADol (ULTRAM) 50 mg tablet Take 1 Tab by mouth two (2) times daily as needed for Pain. Max Daily Amount: 100 mg. 30 Tab 0    lanolin-mineral oil (THERADERM) oil Apply as needed to affected areas. Patient to apply 420 mL 6    Calcium-Cholecalciferol, D3, (CALCIUM 600 WITH VITAMIN D3) 600 mg(1,500mg) -400 unit cap Take  by mouth. 500 calcium 200 units of vitamin d      senna-docusate (PERICOLACE) 8.6-50 mg per tablet Take 1 Tab by mouth daily. As needed for constipation      mirabegron ER (MYRBETRIQ) 25 mg ER tablet Take 1 Tab by mouth daily. Indications: Bladder Hyperactivity 90 Tab 0    albuterol (PROVENTIL HFA, VENTOLIN HFA, PROAIR HFA) 90 mcg/actuation inhaler Take 2 Puffs by inhalation every four (4) hours as needed for Shortness of Breath (cough). 1 Inhaler 1    cholecalciferol, vitamin D3, 2,000 unit tab Take  by mouth. No current facility-administered medications on file prior to visit.          Allergies   Allergen Reactions    Codeine Itching       Objective:  Visit Vitals    /62 (BP 1 Location: Left arm, BP Patient Position: Sitting)    Pulse 72    Temp 97.8 °F (36.6 °C) (Oral)    Resp 18    Ht 5' 4\" (1.626 m)    Wt 119 lb (54 kg)    SpO2 99%    BMI 20.43 kg/m2      Constitutional: Well developed, nourished, no distress, alert   CV: S1, S2.  RRR. No murmurs/rubs. No thrills palpated. No carotid bruits. Intact distal pulses. No edema. Pulm: No abnormalities on inspection. Clear to auscultation bilaterally. No wheezing/rhonchi. Normal effort. Neuro: contractures R hand, R sided weakness. Speech dysarthric. Psych: Appropriate affect, judgement and insight. Short-term memory intact.

## 2018-06-25 NOTE — TELEPHONE ENCOUNTER
Daughter stated her aunt is asking if the additional crying might be a result of the zoloft not a need for more. I assured daughter the dr has the pt's best interest in mind and this is her recommendation for relief from symptoms. She will start the 100 mg dosing tonight. Daughter will monitor and follow up with us a in a few weeks.

## 2018-07-13 NOTE — TELEPHONE ENCOUNTER
Pt is in [facility] for therapy after her fx. ANALI Bell called stating pt's sister is there requesting they give pt \"dementia medication\" and Tylenol Arthritis med. They are asking if there are any new orders. Pt has been complaining of R leg pain, fx is on the left. DON states pt has refused therapy several times. Pt is being d/c Tuesday. Advised ANALI to have family call for f/u appt  to bring pt in for eval/med review after rehab.

## 2018-07-17 NOTE — TELEPHONE ENCOUNTER
Called daughter Elisha Bradley. Pt is using icy hot on leg. Daughter wants to come in with pt's sister to talk about possible dementia. Sister is concerned. They scheduled an appt 7/23/18 w/o the pt. Please advise. Pt was d/c today from [address] [place] [address].

## 2018-07-17 NOTE — TELEPHONE ENCOUNTER
Pt will need to be present. I can't accurately test for dementia given stroke. Can refer to neuro for further eval, if they are interested.

## 2018-07-18 NOTE — TELEPHONE ENCOUNTER
Spoke to daughter Salbador Gaviria. They will keep the 7/23 appt and bring the pt. They are not interested in neuro at this time.

## 2018-08-14 PROBLEM — L89.621 DECUBITUS ULCER OF LEFT HEEL, STAGE 1: Status: ACTIVE | Noted: 2018-01-01

## 2018-08-14 PROBLEM — R41.3 MEMORY IMPAIRMENT: Status: ACTIVE | Noted: 2018-01-01

## 2018-08-14 NOTE — PROGRESS NOTES
Assessment/Plan:    1. Paroxysmal atrial fibrillation (HCC) with  Contraindication to anticoagulation therapy (due to previous ICH and frequent falls)  -rate controlled on BB. 2. Recurrent depression ([address]-  -change zoloft to effexor. F/u in 1mo. 3. Memory impairment-   -unable to accurately assess. Suspect some element of dementia. I discussed the risk of side effects with dementia meds. 4. Fall risk- get bed alarm. 5. Pressure sore L heel- wound care and float heels    A total of 60 minutes was spent with the patient of which 50 minutes was spent in counseling regarding diagnosis, treatment options, prognosis, need for higher level nursing facility. The plan was discussed with the patient. The patient verbalized understanding and is in agreement with the plan. All medication potential side effects were discussed with the patient. Health Maintenance:   Health Maintenance   Topic Date Due    GLAUCOMA SCREENING Q2Y  07/23/2003    Bone Densitometry (Dexa) Screening  07/23/2003    Pneumococcal 65+ Low/Medium Risk (2 of 2 - PCV13) 02/10/2018    Influenza Age 9 to Adult  08/01/2018    MEDICARE YEARLY EXAM  01/30/2019    DTaP/Tdap/Td series (2 - Td) 02/14/2028    ZOSTER VACCINE AGE 60>  Addressed       Princess Watts is a [de-identified] y.o. female and presents with Depression and Home Health (Discharge (rehab) 7/17/18)     Subjective:  Was seen in ER 8/5 for confusion/AMS. U/a was neg and no etiology was found (CXR was nml). Was discharged w/o issues. Daughter reports that she \"kept wanting to go home and attempting to walk (she is wheelchair bound) b/c she couldn't remember that she can't walk\". This has happened several times in the past few months. Daughter reports when she wakes up every morning, she can't remember where she is. The patient gets scared and has a while getting acclimated. She is able to call sister and daughter for reassurance during these periods.   She will awaken in nighttime, confused (doesn't remember how to use call button). Sister and daughter have expressed concerns about her memory. We are unable to assess memory through [facility] testing b/c of expressive aphasia. Her daughter reports episodes where she is tearful and has \"crying spells\". We had switched from lexapro to zoloft 5/2018. Pt denies having difficulty with her mood, although she admits difficulty accepting that she has to live in [facility](not at home). She notes worsening memory (she is no longer able to use a phone b/c she can't figure it out, whereas she was using it 3 mos ago). During this time, she also had L femur fracture with repair. Daughter notes worsening strength since the fracture. Daughter wants zoloft increased. ROS:  Constitutional: No recent weight change. No weakness/fatigue. No f/c. Cardiovascular: No CP/palpitations. No ALY/orthopnea/PND. Respiratory: No cough/sputum, dyspnea, wheezing. Gastointestinal: No dysphagia, reflux. No n/v. No constipation/diarrhea. No melena/rectal bleeding. Psychiatric:  + depression, no anxiety. The problem list was updated as a part of today's visit.   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    Paroxysmal atrial fibrillation (HCC) I48.0    Contraindication to anticoagulation therapy Z53.09    Frequent falls R29.6    Right hand weakness R29.898    Contracture, right hand M24.541    Pure hypercholesterolemia E78.00    Full code status Z78.9    Subclinical hypothyroidism E03.9    OAB (overactive bladder) N32.81    Vitamin D deficiency E55.9    First degree AV block I44.0    Chronic cough R05    History of hemorrhagic stroke with residual hemiplegia (HCC) I69.359    History of vertebral compression fracture Z87.81    Centrilobular emphysema (HCC) J43.2    Recurrent depression (HCC) F33.9    Expressive aphasia R47.01       The PS, FH were reviewed. SH:  Social History   Substance Use Topics    Smoking status: Former Smoker     Years: 10.00    Smokeless tobacco: Never Used    Alcohol use 0.6 oz/week     1 Glasses of wine per week       Medications/Allergies:  Current Outpatient Prescriptions on File Prior to Visit   Medication Sig Dispense Refill    sertraline (ZOLOFT) 100 mg tablet Take 1 Tab by mouth every evening. At 7pm 90 Tab 1    umeclidinium (INCRUSE ELLIPTA) 62.5 mcg/actuation inhaler Take 1 Puff by inhalation daily. Indications: PREVENTION OF BRONCHOSPASMS WITH EMPHYSEMA 30 Inhaler 3    fluticasone-salmeterol (ADVAIR) 250-50 mcg/dose diskus inhaler Take 1 Puff by inhalation two (2) times a day. 3 Inhaler 3    traMADol (ULTRAM) 50 mg tablet Take 1 Tab by mouth two (2) times daily as needed for Pain. Max Daily Amount: 100 mg. 30 Tab 0    lanolin-mineral oil (THERADERM) oil Apply as needed to affected areas. Patient to apply 420 mL 6    Calcium-Cholecalciferol, D3, (CALCIUM 600 WITH VITAMIN D3) 600 mg(1,500mg) -400 unit cap Take  by mouth. 500 calcium 200 units of vitamin d      senna-docusate (PERICOLACE) 8.6-50 mg per tablet Take 1 Tab by mouth daily. As needed for constipation      mirabegron ER (MYRBETRIQ) 25 mg ER tablet Take 1 Tab by mouth daily. Indications: Bladder Hyperactivity 90 Tab 0    albuterol (PROVENTIL HFA, VENTOLIN HFA, PROAIR HFA) 90 mcg/actuation inhaler Take 2 Puffs by inhalation every four (4) hours as needed for Shortness of Breath (cough). 1 Inhaler 1    cholecalciferol, vitamin D3, 2,000 unit tab Take  by mouth. No current facility-administered medications on file prior to visit.          Allergies   Allergen Reactions    Codeine Itching       Objective:  Visit Vitals    /60 (BP 1 Location: Left arm, BP Patient Position: Sitting)    Pulse 87    Temp 97.8 °F (36.6 °C) (Oral)    Resp 20    Ht 5' 4\" (1.626 m)    SpO2 99%      Constitutional: Well developed, nourished, no distress, alert CV: S1, S2.  RRR. No murmurs/rubs. No thrills palpated. No carotid bruits. Intact distal pulses. No edema. No aortic bruits. Pulm: No abnormalities on inspection. Clear to auscultation bilaterally. No wheezing/rhonchi. Normal effort. Neuro: Speech dysarthric. RUE hemiparesis. Psych: Appropriate affect. Short-term memory impaired. Impaired insight. Expressive aphasia. Speech is not always coherent. Skin: L heel with blackened area, skin intact  I spoke with the sister via phone regarding current medical status, stepwise decline, expectations for prognosis.

## 2018-08-14 NOTE — PROGRESS NOTES
Estefania Elizondo is a [de-identified] y.o. female (: 1940) presenting to address:    Chief Complaint   Patient presents with   [facility] 53     Discharge (rehab) 18       Vitals:    18 1258   BP: 100/60   Pulse: 87   Resp: 20   Temp: 97.8 °F (36.6 °C)   TempSrc: Oral   SpO2: 99%   Height: 5' 4\" (1.626 m)   PainSc:   0 - No pain       Learning Assessment:     Learning Assessment 10/20/2016   PRIMARY LEARNER Patient   PRIMARY LANGUAGE ENGLISH   LEARNER PREFERENCE PRIMARY DEMONSTRATION   ANSWERED BY patient   RELATIONSHIP SELF     Depression Screening:     PHQ over the last two weeks 2018   PHQ Not Done Active Diagnosis of Depression or Bipolar Disorder   Little interest or pleasure in doing things -   Feeling down, depressed, irritable, or hopeless -   Total Score PHQ 2 -     Fall Risk Assessment:     Fall Risk Assessment, last 12 mths 4/3/2018   Able to walk? Yes   Fall in past 12 months? Yes   Fall with injury? Yes   Number of falls in past 12 months 5   Fall Risk Score 6     Abuse Screening:     Abuse Screening Questionnaire 2018   Do you ever feel afraid of your partner? N   Are you in a relationship with someone who physically or mentally threatens you? N   Is it safe for you to go home? Y     Coordination of Care Questionaire:   1. Have you been to the ER, urgent care clinic since your last visit? Hospitalized since your last visit? YES [facility] [address] 18- Rehab- 7/3/18    2. Have you seen or consulted any other health care providers outside of the [address] [place] [address] since your last visit? Include any pap smears or colon screening. YES Ortho 18    Advanced Directive:   1. Do you have an Advanced Directive? YES    2. Would you like information on Advanced Directives?  NO

## 2018-08-14 NOTE — MR AVS SNAPSHOT
Mason Bejarano 
 
 
 [address] [facility] [address] 
[phone] Patient: Scott Dolan MRN: [id number] HRB:[id number] Visit Information Date & Time Provider Department Dept. Phone Encounter #  
 8/14/2018  1:00 PM Geovani Marsh, [facility] [place] [phone] [id number] Follow-up Instructions Return in about 1 month (around 9/14/2018). Upcoming Health Maintenance Date Due  
 GLAUCOMA SCREENING Q2Y 7/23/2003 Bone Densitometry (Dexa) Screening 7/23/2003 Pneumococcal 65+ Low/Medium Risk (2 of 2 - PCV13) 2/10/2018 Influenza Age 5 to Adult 8/1/2018 MEDICARE YEARLY EXAM 1/30/2019 DTaP/Tdap/Td series (2 - Td) 2/14/2028 Allergies as of 8/14/2018  Review Complete On: 8/14/2018 By: Geovani Marsh MD  
  
 Severity Noted Reaction Type Reactions Codeine  10/20/2016    Itching Current Immunizations  Never Reviewed Name Date Influenza High Dose Vaccine PF 9/18/2017  1:45 PM  
 Influenza Vaccine 11/11/2017 12:00 AM  
 Pneumococcal Polysaccharide (PPSV-23) 2/10/2017 12:00 AM, 11/14/2016 Tdap 2/14/2018 12:00 AM, 12/11/2017 12:00 AM, 2/10/2017 12:00 AM  
  
 Not reviewed this visit You Were Diagnosed With   
  
 Codes Comments Paroxysmal atrial fibrillation (HCC)    -  Primary ICD-10-CM: I48.0 ICD-9-CM: 427.31 Recurrent depression ([facility]     ICD-10-CM: F33.9 ICD-9-CM: 296.30 Memory impairment     ICD-10-CM: R41.3 ICD-9-CM: 780.93 Contraindication to anticoagulation therapy     ICD-10-CM: Z53.09 
ICD-9-CM: V64.1 Frequent falls     ICD-10-CM: R29.6 ICD-9-CM: V15.88 Decubitus ulcer of left heel, stage 1     ICD-10-CM: M40.863 ICD-9-CM: 707.07, 707.21 Vitals BP Pulse Temp Resp Height(growth percentile) SpO2  
 100/60 (BP 1 Location: Left arm, BP Patient Position: Sitting) 87 97.8 °F (36.6 °C) (Oral) 20 5' 4\" (1.626 m) 99% OB Status Smoking Status Hysterectomy Former Smoker Preferred Pharmacy Pharmacy Name Phone [facility], [address] [phone] Your Updated Medication List  
  
   
This list is accurate as of 8/14/18  1:43 PM.  Always use your most recent med list.  
  
  
  
  
 albuterol 90 mcg/actuation inhaler Commonly known as:  PROVENTIL HFA, VENTOLIN HFA, PROAIR HFA Take 2 Puffs by inhalation every four (4) hours as needed for Shortness of Breath (cough). CALCIUM 600 WITH VITAMIN D3 600 mg(1,500mg) -400 unit Cap Generic drug:  Calcium-Cholecalciferol (D3) Take  by mouth. 500 calcium 200 units of vitamin d  
  
 cholecalciferol (vitamin D3) 2,000 unit Tab Take  by mouth. fluticasone-salmeterol 250-50 mcg/dose diskus inhaler Commonly known as:  ADVAIR Take 1 Puff by inhalation two (2) times a day. lanolin-mineral oil Oil Commonly known as:  Stiven Guptaie Apply as needed to affected areas. Patient to apply  
  
 metoprolol tartrate 25 mg tablet Commonly known as:  LOPRESSOR Take 12.5 mg by mouth two (2) times a day. mirabegron ER 25 mg ER tablet Commonly known as:  MYRBETRIQ Take 1 Tab by mouth daily. Indications: Bladder Hyperactivity  
  
 senna-docusate 8.6-50 mg per tablet Commonly known as:  Mis Demetri Take 1 Tab by mouth daily. As needed for constipation  
  
 traMADol 50 mg tablet Commonly known as:  ULTRAM  
Take 1 Tab by mouth two (2) times daily as needed for Pain. Max Daily Amount: 100 mg.  
  
 umeclidinium 62.5 mcg/actuation inhaler Commonly known as:  INCRUSE ELLIPTA Take 1 Puff by inhalation daily. Indications: PREVENTION OF BRONCHOSPASMS WITH EMPHYSEMA  
  
 venlafaxine-SR 37.5 mg capsule Commonly known as:  EFFEXOR-XR Take 1 Cap by mouth daily. Prescriptions Sent to Pharmacy Refills  
 venlafaxine-SR (EFFEXOR-XR) 37.5 mg capsule 0 Sig: Take 1 Cap by mouth daily.   
 Class: Normal  
 Pharmacy: [id number]  [facility] , [address] [place] [address] #: [phone] Route: Oral  
  
Follow-up Instructions Return in about 1 month (around 9/14/2018). Introducing [facility] SERVICES! Fidel Simone introduces [employer] patient portal. Now you can access parts of your medical record, email your doctor's office, and request medication refills online. 1. In your internet browser, go to [email or web address] 2. Click on the First Time User? Click Here link in the Sign In box. You will see the New Member Sign Up page. 3. Enter your [employer] Access Code exactly as it appears below. You will not need to use this code after youve completed the sign-up process. If you do not sign up before the expiration date, you must request a new code. · [employer] Access Code: [email or web address] Expires: 11/12/2018  1:42 PM 
 
4. Enter the last four digits of your Social Security Number (xxxx) and Date of Birth (mm/dd/yyyy) as indicated and click Submit. You will be taken to the next sign-up page. 5. Create a Sh[employer] ID. This will be your [employer] login ID and cannot be changed, so think of one that is secure and easy to remember. 6. Create a [employer] password. You can change your password at any time. 7. Enter your Password Reset Question and Answer. This can be used at a later time if you forget your password. 8. Enter your e-mail address. You will receive e-mail notification when new information is available in [address] 9. Click Sign Up. You can now view and download portions of your medical record. 10. Click the Download Summary menu link to download a portable copy of your medical information. If you have questions, please visit the Frequently Asked Questions section of the [employer] website. Remember, [employer] is NOT to be used for urgent needs. For medical emergencies, dial 911. Now available from your iPhone and Android! Please provide this summary of care documentation to your next provider. Your primary care clinician is listed as Perla Basurto. If you have any questions after today's visit, please call [phone].

## 2018-08-17 NOTE — PROGRESS NOTES
.  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 Patient's lives in care home mental status progressing . Patient/family hasnot returned any [facility] contact calls.  Patient was seen in the office 8/16/2018   Patients upcoming visits:  Future Appointments  Date Time Provider Jeremiah Ricketts   9/18/2018 1:00 PM Madelyn Khoury MD [facility]

## 2018-08-17 NOTE — PROGRESS NOTES
.  Hospital Discharge Follow-Up      Patient has been seen by provider for this admission on 6/8/2018 No return call from [facility] nor patient to NN.  NN called again

## 2018-08-21 NOTE — PROGRESS NOTES
Received order for one arm drive wheelchair for pt from [facility]. [facility]. Pt never completed eval for power wheelchair ordered 6/8/18 due to pt being on home health. Pt cannot be on home health and have occupational therapy eval from outpatient therapy facility such as [facility]. Once [place] completed, pt can have outside therapy eval for power chair. Sending signed manual order to [facility] currently for pt to try manual chair.

## 2018-08-29 NOTE — TELEPHONE ENCOUNTER
Returned POA call. She is asking about whether the new med should take effect immediately and when should they see improvements in mood. I explained these meds can take several weeks to make a difference. She is wanting to know why not increase the dosage rather than switch the med. I asked her to accompany on next visit so concerns can be addressed.

## 2018-08-30 NOTE — TELEPHONE ENCOUNTER
Daughter called stating pt is a \"crying mess' since the medication switch. Returned daughter call, left msg for her giving her the same info as sister- med changes involve an adjustment period. Please advise. Pt scheduled for one month. 9/18/18.

## 2018-09-06 NOTE — TELEPHONE ENCOUNTER
Daughter called, left msg that moving the appt is difficult because the pt cannot transfer in and out of her car. Returned daughter Estela's call, left voicemail.

## 2018-09-07 NOTE — TELEPHONE ENCOUNTER
Home Health nurse says pt fell again last night. Has skin tear to right thigh and left hand. They have bandaged it with foam dressing and will monitor.

## 2018-09-18 PROBLEM — Z74.1 DEPENDENT FOR MOBILITY: Status: ACTIVE | Noted: 2018-01-01

## 2018-09-18 NOTE — MR AVS SNAPSHOT
[address] [place] [address]  [address] 2950 [facility] [address] [phone] Patient: Tim Bryant MRN: [id number] Visit Information Date & Time Provider Department Dept. Phone Encounter #  
 9/18/2018  1:00 PM Wendy Jenkins, 3 [place] [phone] Upcoming Health Maintenance Date Due  
 GLAUCOMA SCREENING Q2Y 7/23/2003 Bone Densitometry (Dexa) Screening 7/23/2003 Pneumococcal 65+ Low/Medium Risk (2 of 2 - PCV13) 2/10/2018 Influenza Age 5 to Adult 8/1/2018 MEDICARE YEARLY EXAM 1/30/2019 DTaP/Tdap/Td series (2 - Td) 2/14/2028 Allergies as of 9/18/2018  Review Complete On: 9/18/2018 By: Jhoan Quiroz Severity Noted Reaction Type Reactions Codeine  10/20/2016    Itching Current Immunizations  Never Reviewed Name Date Influenza High Dose Vaccine PF 9/18/2017  1:45 PM  
 Influenza Vaccine 11/11/2017 12:00 AM  
 Pneumococcal Polysaccharide (PPSV-23) 2/10/2017 12:00 AM, 11/14/2016 Tdap 2/14/2018 12:00 AM, 12/11/2017 12:00 AM, 2/10/2017 12:00 AM  
  
 Not reviewed this visit You Were Diagnosed With   
  
 Codes Comments Recurrent depression ([facility]    -  Primary ICD-10-CM: F33.9 ICD-9-CM: 296.30 Frequent falls     ICD-10-CM: R29.6 ICD-9-CM: V15.88 Dependent for mobility     ICD-10-CM: Z74.1 ICD-9-CM: V60.89 Dementia without behavioral disturbance, unspecified dementia type     ICD-10-CM: F03.90 ICD-9-CM: 294.20 Sundowning     ICD-10-CM: F05 ICD-9-CM: [id number] Confusion     ICD-10-CM: R41.0 ICD-9-CM: 298.9 Fall, initial encounter     ICD-10-CM: W19. Weyman Hamper ICD-9-CM: E888.9 Rib pain on right side     ICD-10-CM: R07.81 ICD-9-CM: 786.50 Vitals BP Pulse Temp Resp Height(growth percentile) Weight(growth percentile)  120/60 (BP 1 Location: Left arm, BP Patient Position: Sitting) 91 97.7 °F (36.5 °C) (Oral) 20 5' 4\" (1.626 m) 119 lb (54 kg) SpO2 BMI OB Status Smoking Status 96% 20.43 kg/m2 Hysterectomy Former Smoker Vitals History BMI and BSA Data Body Mass Index Body Surface Area  
 20.43 kg/m 2 1.56 m 2 Preferred Pharmacy Pharmacy Name Phone Dianne Shaw, [address] [phone] Your Updated Medication List  
  
   
This list is accurate as of 9/18/18  1:57 PM.  Always use your most recent med list.  
  
  
  
  
 albuterol 90 mcg/actuation inhaler Commonly known as:  PROVENTIL HFA, VENTOLIN HFA, PROAIR HFA Take 2 Puffs by inhalation every four (4) hours as needed for Shortness of Breath (cough). fluticasone-salmeterol 250-50 mcg/dose diskus inhaler Commonly known as:  ADVAIR Take 1 Puff by inhalation two (2) times a day. lanolin-mineral oil Oil Commonly known as:  Graciella Nova Apply as needed to affected areas. Patient to apply LORazepam 0.5 mg tablet Commonly known as:  ATIVAN Take 1 Tab by mouth daily as needed for Anxiety. metoprolol tartrate 25 mg tablet Commonly known as:  LOPRESSOR Take 12.5 mg by mouth two (2) times a day. mirabegron ER 25 mg ER tablet Commonly known as:  MYRBETRIQ Take 1 Tab by mouth daily. Indications: Bladder Hyperactivity  
  
 naloxone 4 mg/actuation nasal spray Commonly known as:  [employer] Use 1 spray intranasally, then discard. Repeat with new spray every 2 min as needed for opioid overdose symptoms, alternating nostrils. senna-docusate 8.6-50 mg per tablet Commonly known as:  Heike Anon Take 1 Tab by mouth daily. As needed for constipation  
  
 traMADol 50 mg tablet Commonly known as:  ULTRAM  
Daily and 30 min prior to PT (not to exceed q8hr frequency). umeclidinium 62.5 mcg/actuation inhaler Commonly known as:  INCRUSE ELLIPTA Take 1 Puff by inhalation daily.  Indications: PREVENTION OF BRONCHOSPASMS WITH EMPHYSEMA  
  
 venlafaxine-SR 75 mg capsule Commonly known as:  EFFEXOR-XR Take 1 Cap by mouth daily. Prescriptions Printed Refills LORazepam (ATIVAN) 0.5 mg tablet 0 Sig: Take 1 Tab by mouth daily as needed for Anxiety. Class: Print Route: Oral  
 traMADol (ULTRAM) 50 mg tablet 0 Sig: Daily and 30 min prior to PT (not to exceed q8hr frequency). Class: Print Prescriptions Sent to Pharmacy Refills  
 venlafaxine-SR (EFFEXOR-XR) 75 mg capsule 0 Sig: Take 1 Cap by mouth daily. Class: Normal  
 Pharmacy: [address] [place] [address], [address] [place] [address] Ph #: [phone] Route: Oral  
 naloxone (NARCAN) 4 mg/actuation nasal spray 1 Sig: Use 1 spray intranasally, then discard. Repeat with new spray every 2 min as needed for opioid overdose symptoms, alternating nostrils. Class: Normal  
 Pharmacy: [address] [place] [address], [address] [place] [address] Ph #: [phone] To-Do List   
 09/18/2018 Microbiology:  CULTURE, URINE   
  
 09/18/2018 Lab:  URINALYSIS W/ RFLX MICROSCOPIC Introducing [facility]! Loy Hampton introduces [email or web address] patient portal. Now you can access parts of your medical record, email your doctor's office, and request medication refills online. 1. In your internet browser, go to [email or web address] 2. Click on the First Time User? Click Here link in the Sign In box. You will see the New Member Sign Up page. 3. Enter your City[employer][email or web address] Access Code exactly as it appears below. You will not need to use this code after youve completed the sign-up process. If you do not sign up before the expiration date, you must request a new code. · [email or web address] Access Code: [email or web address] Expires: 11/12/2018  1:42 PM 
 
4. Enter the last four digits of your Social Security Number (xxxx) and Date of Birth (mm/dd/yyyy) as indicated and click Submit.  You will be taken to the next sign-up page. 5. Create a Vizolution ID. This will be your Vizolution login ID and cannot be changed, so think of one that is secure and easy to remember. 6. Create a Vizolution password. You can change your password at any time. 7. Enter your Password Reset Question and Answer. This can be used at a later time if you forget your password. 8. Enter your e-mail address. You will receive e-mail notification when new information is available in [address]. 9. Click Sign Up. You can now view and download portions of your medical record. 10. Click the Download Summary menu link to download a portable copy of your medical information. If you have questions, please visit the Frequently Asked Questions section of the Vizolution website. Remember, Vizolution is NOT to be used for urgent needs. For medical emergencies, dial 911. Now available from your iPhone and Android! Please provide this summary of care documentation to your next provider. Your primary care clinician is listed as Perla 13. If you have any questions after today's visit, please call [phone].

## 2018-09-18 NOTE — MR AVS SNAPSHOT
[address] [place] [address] [address] 5[address] [facility] [address] [phone] Patient: Hannah Raymond MRN: [id number] BWE:[birth year] Visit Information Date & Time Provider Department Dept. Phone Encounter #  
 9/18/2018  1:00 PM Kyler Rutledge, 3 [facility] [phone] Upcoming Health Maintenance Date Due  
 GLAUCOMA SCREENING Q2Y 7/23/2003 Bone Densitometry (Dexa) Screening 7/23/2003 Pneumococcal 65+ Low/Medium Risk (2 of 2 - PCV13) 2/10/2018 Influenza Age 5 to Adult 8/1/2018 MEDICARE YEARLY EXAM 1/30/2019 DTaP/Tdap/Td series (2 - Td) 2/14/2028 Allergies as of 9/18/2018  Review Complete On: 9/18/2018 By: Jinny Davis Severity Noted Reaction Type Reactions Codeine  10/20/2016    Itching Current Immunizations  Never Reviewed Name Date Influenza High Dose Vaccine PF 9/18/2017  1:45 PM  
 Influenza Vaccine 11/11/2017 12:00 AM  
 Pneumococcal Polysaccharide (PPSV-23) 2/10/2017 12:00 AM, 11/14/2016 Tdap 2/14/2018 12:00 AM, 12/11/2017 12:00 AM, 2/10/2017 12:00 AM  
  
 Not reviewed this visit You Were Diagnosed With   
  
 Codes Comments Recurrent depression ([facility]    -  Primary ICD-10-CM: F33.9 ICD-9-CM: 296.30 Frequent falls     ICD-10-CM: R29.6 ICD-9-CM: V15.88 Dependent for mobility     ICD-10-CM: Z74.1 ICD-9-CM: V60.89 Dementia without behavioral disturbance, unspecified dementia type     ICD-10-CM: F03.90 ICD-9-CM: 294.20 Sundowning     ICD-10-CM: F05 ICD-9-CM: [id number] Confusion     ICD-10-CM: R41.0 ICD-9-CM: 298.9 Fall, initial encounter     ICD-10-CM: W19. Fritzi Yary ICD-9-CM: E888.9 Rib pain on right side     ICD-10-CM: R07.81 ICD-9-CM: 786.50 Vitals BP Pulse Temp Resp Height(growth percentile) Weight(growth percentile)  120/60 (BP 1 Location: Left arm, BP Patient Position: Sitting) 91 97.7 °F (36.5 °C) (Oral) 20 5' 4\" (1.626 m) 119 lb (54 kg) SpO2 BMI OB Status Smoking Status 96% 20.43 kg/m2 Hysterectomy Former Smoker Vitals History BMI and BSA Data Body Mass Index Body Surface Area  
 20.43 kg/m 2 1.56 m 2 Preferred Pharmacy Pharmacy Name Phone Mari De, [address] [phone] Your Updated Medication List  
  
   
This list is accurate as of 9/18/18  1:57 PM.  Always use your most recent med list.  
  
  
  
  
 albuterol 90 mcg/actuation inhaler Commonly known as:  PROVENTIL HFA, VENTOLIN HFA, PROAIR HFA Take 2 Puffs by inhalation every four (4) hours as needed for Shortness of Breath (cough). fluticasone-salmeterol 250-50 mcg/dose diskus inhaler Commonly known as:  ADVAIR Take 1 Puff by inhalation two (2) times a day. lanolin-mineral oil Oil Commonly known as:  Talha Shayan Apply as needed to affected areas. Patient to apply LORazepam 0.5 mg tablet Commonly known as:  ATIVAN Take 1 Tab by mouth daily as needed for Anxiety. metoprolol tartrate 25 mg tablet Commonly known as:  LOPRESSOR Take 12.5 mg by mouth two (2) times a day. mirabegron ER 25 mg ER tablet Commonly known as:  MYRBETRIQ Take 1 Tab by mouth daily. Indications: Bladder Hyperactivity  
  
 naloxone 4 mg/actuation nasal spray Commonly known as:  [employer] Use 1 spray intranasally, then discard. Repeat with new spray every 2 min as needed for opioid overdose symptoms, alternating nostrils. senna-docusate 8.6-50 mg per tablet Commonly known as:  Darrell Finner Take 1 Tab by mouth daily. As needed for constipation  
  
 traMADol 50 mg tablet Commonly known as:  ULTRAM  
Daily and 30 min prior to PT (not to exceed q8hr frequency). umeclidinium 62.5 mcg/actuation inhaler Commonly known as:  INCRUSE ELLIPTA Take 1 Puff by inhalation daily.  Indications: PREVENTION OF BRONCHOSPASMS WITH EMPHYSEMA  
  
 venlafaxine-SR 75 mg capsule Commonly known as:  EFFEXOR-XR Take 1 Cap by mouth daily. Prescriptions Printed Refills LORazepam (ATIVAN) 0.5 mg tablet 0 Sig: Take 1 Tab by mouth daily as needed for Anxiety. Class: Print Route: Oral  
 traMADol (ULTRAM) 50 mg tablet 0 Sig: Daily and 30 min prior to PT (not to exceed q8hr frequency). Class: Print Prescriptions Sent to Pharmacy Refills  
 venlafaxine-SR (EFFEXOR-XR) 75 mg capsule 0 Sig: Take 1 Cap by mouth daily. Class: Normal  
 Pharmacy: [address] [place] [address], [address] [place] [address] Ph #: [phone] Route: Oral  
 naloxone (NARCAN) 4 mg/actuation nasal spray 1 Sig: Use 1 spray intranasally, then discard. Repeat with new spray every 2 min as needed for opioid overdose symptoms, alternating nostrils. Class: Normal  
 Pharmacy: [address] [place] [address], [address] [place] [address] Ph #: [phone] To-Do List   
 09/18/2018 Microbiology:  CULTURE, URINE   
  
 09/18/2018 Lab:  URINALYSIS W/ RFLX MICROSCOPIC Introducing [address]! [facility] introduces [employer] patient portal. Now you can access parts of your medical record, email your doctor's office, and request medication refills online. 1. In your internet browser, go to [email or web address] 2. Click on the First Time User? Click Here link in the Sign In box. You will see the New Member Sign Up page. 3. Enter your MongoDB Access Code exactly as it appears below. You will not need to use this code after youve completed the sign-up process. If you do not sign up before the expiration date, you must request a new code. · MongoDB Access Code: [email or web address] Expires: 11/12/2018  1:42 PM 
 
4. Enter the last four digits of your Social Security Number (xxxx) and Date of Birth (mm/dd/yyyy) as indicated and click Submit.  You will be taken to the next sign-up page. 5. Create a [employer] ID. This will be your [employer] login ID and cannot be changed, so think of one that is secure and easy to remember. 6. Create a [employer] password. You can change your password at any time. 7. Enter your Password Reset Question and Answer. This can be used at a later time if you forget your password. 8. Enter your e-mail address. You will receive e-mail notification when new information is available in [address] 9. Click Sign Up. You can now view and download portions of your medical record. 10. Click the Download Summary menu link to download a portable copy of your medical information. If you have questions, please visit the Frequently Asked Questions section of the [employer] website. Remember, [employer] is NOT to be used for urgent needs. For medical emergencies, dial 911. Now available from your iPhone and Android! Please provide this summary of care documentation to your next provider. Your primary care clinician is listed as Perla 13. If you have any questions after today's visit, please call [phone].

## 2018-09-18 NOTE — PROGRESS NOTES
Robyn Galarza is a [de-identified] y.o. female (: 1940) presenting to address:    Chief Complaint   Patient presents with    Depression       Vitals:    18 1303   BP: 120/60   Pulse: 91   Resp: 20   Temp: 97.7 °F (36.5 °C)   TempSrc: Oral   SpO2: 96%   Weight: 119 lb (54 kg)   Height: 5' 4\" (1.626 m)   PainSc:   0 - No pain       Learning Assessment:     Learning Assessment 10/20/2016   PRIMARY LEARNER Patient   PRIMARY LANGUAGE ENGLISH   LEARNER PREFERENCE PRIMARY DEMONSTRATION   ANSWERED BY patient   RELATIONSHIP SELF     Depression Screening:     PHQ over the last two weeks 2018   PHQ Not Done Active Diagnosis of Depression or Bipolar Disorder   Little interest or pleasure in doing things -   Feeling down, depressed, irritable, or hopeless -   Total Score PHQ 2 -     Fall Risk Assessment:     Fall Risk Assessment, last 12 mths 4/3/2018   Able to walk? Yes   Fall in past 12 months? Yes   Fall with injury? Yes   Number of falls in past 12 months 5   Fall Risk Score 6     Abuse Screening:     Abuse Screening Questionnaire 2018   Do you ever feel afraid of your partner? N   Are you in a relationship with someone who physically or mentally threatens you? N   Is it safe for you to go home? Y     Coordination of Care Questionaire:   1. Have you been to the ER, urgent care clinic since your last visit? Hospitalized since your last visit? YES [facility] 18    2. Have you seen or consulted any other health care providers outside of the [address] [place] [address] since your last visit? Include any pap smears or colon screening. NO    Advanced Directive:   1. Do you have an Advanced Directive? YES    2. Would you like information on Advanced Directives?  NO

## 2018-09-18 NOTE — PROGRESS NOTES
Assessment/Plan:    1. Recurrent depression ([facility]  -incr dose effexor. F/u in1 mo  - venlafaxine-SR (EFFEXOR-XR) 75 mg capsule; Take 1 Cap by mouth daily. Dispense: 90 Cap; Refill: 0    2. Frequent falls and dependent for mobility  -discussed she likely needs higher level of care, including bed alarm as she often attempts to get out of bed/chair unassisted. I discussed she may not return to baseline given significant deconditioning, R hemiparesis, dementia, etc.    3. Dementia without behavioral disturbance, unspecified dementia type  -strongly suspect vascular dementia. Moderate global atrophy on CT, in addition to encephalomalacia from previous hemorrhagic stroke. Discussed meds like namenda and aricept have side effects, may only slow progression and only helps about 50% of people who take it. They declined meds. 5. Sundowning  -discussed risk for falls with concomitant benzo and pain med use. Tried to obtain urine to r/o infection for cause of sundowning/confusion- will try to obtain at [facility]. Ativan prn for episodes of anxiety/agitation. It's difficult to tell if these episodes are related to inadequate pain control, behavioral changes related to dementia vs uncontrolled depression/anxiety. i have discussed increased fall risk with ativan use. - LORazepam (ATIVAN) 0.5 mg tablet; Take 1 Tab by mouth daily as needed for Anxiety. Dispense: 30 Tab; Refill: 0  - CULTURE, URINE; Future  - URINALYSIS W/ RFLX MICROSCOPIC; Future    6. Chronic pain  -family feels pain is inadequately treated as pt can't appropriately request meds. Will do scheduled am dosing when pain is worse. Also dose before PT.  - traMADol (ULTRAM) 50 mg tablet; Daily and 30 min prior to PT (not to exceed q8hr frequency). Dispense: 30 Tab; Refill: 0  -narcan prescribed as well for risk of unintentional overdose, which I also discussed with family.     I had an extensive discussion with the family regarding the pt needing higher level of care. She is completely dependent for transfers/mobility. I feel her needs would be better met in SNF. I also discussed bridge hospice as pt wishes to focus on controlling her pain and \"being happy\". I discussed that she may not get better with respect to mobility/independence. She wishes to avoid meds when possible. We have changed vit d to weekly dosing to help minimize pill burden. The plan was discussed with the patient. The patient verbalized understanding and is in agreement with the plan. All medication potential side effects were discussed with the patient. A total of 75 minutes was spent with the patient and family of which 65 minutes was spent in counseling regarding diagnosis, prognosis (dementia, stroke, hemiparesis, deconditioning), pain control, goals of care. Health Maintenance:   Health Maintenance   Topic Date Due    GLAUCOMA SCREENING Q2Y  07/23/2003    Bone Densitometry (Dexa) Screening  07/23/2003    Pneumococcal 65+ Low/Medium Risk (2 of 2 - PCV13) 02/10/2018    Influenza Age 9 to Adult  08/01/2018    MEDICARE YEARLY EXAM  01/30/2019    DTaP/Tdap/Td series (2 - Td) 02/14/2028    ZOSTER VACCINE AGE 60>  Addressed     Attila Griffin is a [de-identified] y.o. female and presents with Depression     Subjective:  Her 2 sisters and daughter are present and help present hx. Depression - was changed from zoloft to effexor at the last visit due to uncontrolled sx. Since then, daughter reports worsening tearfulness. She has had NUMEROUS falls since the last visit. She remains in an [facility], so a bed alarm wasn't able to be obtained. She continues to attempt to transfer independently, although she is no longer able to do so safely. She continues to exhibit sx of dementia/confusion. Unable to reliably assess due to expressive aphasia from previous hemorrhagic stroke. Lately, she has been more confused, per family.     Her sister states she tends to get agitated when her pain is uncontrolled. However, pt is unable to adequately ask for pain meds. Her sister had been dosing tramadol bid (instead of prn). That seemed to help her pain and agitation significantly. They are requesting scheduled pain meds. I had a kassandra discussion with     ROS:  Constitutional: No recent weight change. No weakness/fatigue. No f/c. Cardiovascular: No CP/palpitations. No ALY/orthopnea/PND. Respiratory: No cough/sputum, dyspnea, wheezing. Gastointestinal: No dysphagia, reflux. No n/v. No constipation/diarrhea. No melena/rectal bleeding. Psychiatric:  + depression, no anxiety. The problem list was updated as a part of today's visit. 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    Paroxysmal atrial fibrillation (HCC) I48.0    Contraindication to anticoagulation therapy Z53.09    Frequent falls R29.6    Right hand weakness R29.898    Contracture, right hand M24.541    Pure hypercholesterolemia E78.00    Full code status Z78.9    Subclinical hypothyroidism E03.9    OAB (overactive bladder) N32.81    Vitamin D deficiency E55.9    First degree AV block I44.0    Chronic cough R05    History of hemorrhagic stroke with residual hemiplegia (HCC) I69.359    History of vertebral compression fracture Z87.81    Centrilobular emphysema (HCC) J43.2    Recurrent depression (HCC) F33.9    Expressive aphasia R47.01    Memory impairment R41.3    Decubitus ulcer of left heel, stage 1 L89.621       The PSH, FH were reviewed.     SH:  Social History   Substance Use Topics    Smoking status: Former Smoker     Years: 10.00    Smokeless tobacco: Never Used    Alcohol use 0.6 oz/week     1 Glasses of wine per week       Medications/Allergies:  Current Outpatient Prescriptions on File Prior to Visit   Medication Sig Dispense Refill    metoprolol tartrate (LOPRESSOR) 25 mg tablet Take 12.5 mg by mouth two (2) times a day.  venlafaxine-SR (EFFEXOR-XR) 37.5 mg capsule Take 1 Cap by mouth daily. 90 Cap 0    umeclidinium (INCRUSE ELLIPTA) 62.5 mcg/actuation inhaler Take 1 Puff by inhalation daily. Indications: PREVENTION OF BRONCHOSPASMS WITH EMPHYSEMA 30 Inhaler 3    fluticasone-salmeterol (ADVAIR) 250-50 mcg/dose diskus inhaler Take 1 Puff by inhalation two (2) times a day. 3 Inhaler 3    traMADol (ULTRAM) 50 mg tablet Take 1 Tab by mouth two (2) times daily as needed for Pain. Max Daily Amount: 100 mg. 30 Tab 0    lanolin-mineral oil (THERADERM) oil Apply as needed to affected areas. Patient to apply 420 mL 6    Calcium-Cholecalciferol, D3, (CALCIUM 600 WITH VITAMIN D3) 600 mg(1,500mg) -400 unit cap Take  by mouth. 500 calcium 200 units of vitamin d      senna-docusate (PERICOLACE) 8.6-50 mg per tablet Take 1 Tab by mouth daily. As needed for constipation      mirabegron ER (MYRBETRIQ) 25 mg ER tablet Take 1 Tab by mouth daily. Indications: Bladder Hyperactivity 90 Tab 0    albuterol (PROVENTIL HFA, VENTOLIN HFA, PROAIR HFA) 90 mcg/actuation inhaler Take 2 Puffs by inhalation every four (4) hours as needed for Shortness of Breath (cough). 1 Inhaler 1    cholecalciferol, vitamin D3, 2,000 unit tab Take  by mouth. No current facility-administered medications on file prior to visit. Allergies   Allergen Reactions    Codeine Itching       Objective:  Visit Vitals    /60 (BP 1 Location: Left arm, BP Patient Position: Sitting)    Pulse 91    Temp 97.7 °F (36.5 °C) (Oral)    Resp 20    Ht 5' 4\" (1.626 m)    Wt 119 lb (54 kg)    SpO2 96%    BMI 20.43 kg/m2      Constitutional: Well developed, nourished, no distress, alert   Neuro:  R hemiparesis. Speech dysarthric. Skin: Several skin tears and ecchymoses on extremities    Psych: Appropriate affect . Speech doesn't make sense at times.
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
## 2018-10-01 NOTE — TELEPHONE ENCOUNTER
Pt's wound nurse called stating pt fell again and has a skin tear after 9/26/18 fall. She said the nurses are asking for Ativan to be scheduled instead of prn.

## 2018-10-03 NOTE — TELEPHONE ENCOUNTER
Please clarify ativan order.   It was changed at last ov to daily in am, scheduled and PRN prior to PT.

## 2018-10-03 NOTE — TELEPHONE ENCOUNTER
Urine had been collected per nurse, abx were ordered and pt is finishing those today. Per Dr Sonia Amaral the timetable is uncertain as to improvement. Daughter understands.

## 2018-10-11 NOTE — TELEPHONE ENCOUNTER
Wing Jayce RN, [facility], called stating Ms. Jimmy Adams seems to be reacting a bit to the adhesive on her dressings. She is changing her to non stick pads for her wound care.

## 2018-10-16 NOTE — PROGRESS NOTES
Assessment/Plan:    1. Dementia with behavioral disturbance, unspecified dementia type and sundowning  -increase ativan to tid prn. F/u in 1mo. If behavioral issues continue, may need to consider antipsychotics. i have discussed increased risk of mortality with this class of medication.    - LORazepam (ATIVAN) 0.5 mg tablet; Take 1 Tab by mouth every eight (8) hours as needed for Anxiety. Max Daily Amount: 1.5 mg.  Dispense: 90 Tab; Refill: 0    3. Encounter for immunization  - Influenza Vaccine Inactivated (IIV) (FLUAD), Subunit, Adjuvanted, IM ([id number])  - [place] IMMUNIZ ADMIN,1 SINGLE/COMB VAC/TOXOID    The plan was discussed with the patient. The patient verbalized understanding and is in agreement with the plan. All medication potential side effects were discussed with the patient. Health Maintenance:   Health Maintenance   Topic Date Due    Shingrix Vaccine Age 49> (1 of 2) 07/23/1988    GLAUCOMA SCREENING Q2Y  07/23/2003    Bone Densitometry (Dexa) Screening  07/23/2003    Pneumococcal 65+ Low/Medium Risk (2 of 2 - PCV13) 02/10/2018    Influenza Age 9 to Adult  08/01/2018    MEDICARE YEARLY EXAM  01/30/2019    DTaP/Tdap/Td series (2 - Td) 02/14/2028       Bruce Keys is a [de-identified] y.o. female and presents with Depression     Subjective: We had a long visit last month for uncontrolled depression, recurrent falls, worsening dementia. Dose effexor was increased. In addition, she was started on ativan prn agitation and tramadol scheduled daily for pain (as pt has hard time verbalizing pain/requesting med when it was written for prn). We had the discussion with daughter and sisters that concurrent pain med/benzo use increases mortality, falls. The patient and family wished to proceed with plan despite risk as the benefit (quality of life) was more important. In addition, she was treated for UTI, but this didn't seem to change/improve any of the sx above.   The daughter states the patient is 'having hysterics'. She has episodes where she believes \"her apartment is not her apartment\". When she does this, the patient is acting violent (banging on doors). Caretaker also presents part of history (occ gets aggressive- cursing and hitting walls). During these episodes, she isn't re-directable. The KRYSTINA hasn't been giving ativan (b/c they don't want to use it incase she needs it later in the day). Daughter doesn't feel the ativan is too sedating. The daughter states the scheduled tramadol has helped control her pain better and the pain is now well controlled. ROS:  Constitutional: No recent weight change. No weakness/fatigue. No f/c. Cardiovascular: No CP/palpitations. No ALY/orthopnea/PND. Respiratory: No cough/sputum, dyspnea, wheezing. Gastointestinal: No dysphagia, reflux. No n/v. No constipation/diarrhea. No melena/rectal bleeding. Neurological: No seizures/numbness/weakness. No paresthesias. Psychiatric:  + depression, +anxiety. The problem list was updated as a part of today's visit.   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    Paroxysmal atrial fibrillation (HCC) I48.0    Contraindication to anticoagulation therapy Z53.09    Frequent falls R29.6    Right hand weakness R29.898    Contracture, right hand M24.541    Pure hypercholesterolemia E78.00    Full code status Z78.9    Subclinical hypothyroidism E03.9    OAB (overactive bladder) N32.81    Vitamin D deficiency E55.9    First degree AV block I44.0    Chronic cough R05    History of hemorrhagic stroke with residual hemiplegia (HCC) I69.359    History of vertebral compression fracture Z87.81    Centrilobular emphysema (HCC) J43.2    Recurrent depression (HCC) F33.9    Expressive aphasia R47.01    Memory impairment R41.3    Decubitus ulcer of left heel, stage 1 L89.621    Dependent for mobility Z74.1       The PSH, FH were reviewed. SH:  Social History   Substance Use Topics    Smoking status: Former Smoker     Years: 10.00    Smokeless tobacco: Never Used    Alcohol use 0.6 oz/week     1 Glasses of wine per week       Medications/Allergies:  Current Outpatient Prescriptions on File Prior to Visit   Medication Sig Dispense Refill    metoprolol tartrate (LOPRESSOR) 25 mg tablet Take 0.5 Tabs by mouth two (2) times a day. 90 Tab 1    venlafaxine-SR (EFFEXOR-XR) 75 mg capsule Take 1 Cap by mouth daily. 90 Cap 0    LORazepam (ATIVAN) 0.5 mg tablet Take 1 Tab by mouth daily as needed for Anxiety. 30 Tab 0    naloxone (NARCAN) 4 mg/actuation nasal spray Use 1 spray intranasally, then discard. Repeat with new spray every 2 min as needed for opioid overdose symptoms, alternating nostrils. 1 Each 1    traMADol (ULTRAM) 50 mg tablet Daily and 30 min prior to PT (not to exceed q8hr frequency). 30 Tab 0    ergocalciferol (VITAMIN D2) 50,000 unit capsule Take 1 Cap by mouth every seven (7) days for 12 doses. 12 Cap 0    umeclidinium (INCRUSE ELLIPTA) 62.5 mcg/actuation inhaler Take 1 Puff by inhalation daily. Indications: PREVENTION OF BRONCHOSPASMS WITH EMPHYSEMA 30 Inhaler 3    fluticasone-salmeterol (ADVAIR) 250-50 mcg/dose diskus inhaler Take 1 Puff by inhalation two (2) times a day. 3 Inhaler 3    lanolin-mineral oil (THERADERM) oil Apply as needed to affected areas. Patient to apply 420 mL 6    senna-docusate (PERICOLACE) 8.6-50 mg per tablet Take 1 Tab by mouth daily. As needed for constipation      mirabegron ER (MYRBETRIQ) 25 mg ER tablet Take 1 Tab by mouth daily. Indications: Bladder Hyperactivity 90 Tab 0    albuterol (PROVENTIL HFA, VENTOLIN HFA, PROAIR HFA) 90 mcg/actuation inhaler Take 2 Puffs by inhalation every four (4) hours as needed for Shortness of Breath (cough). 1 Inhaler 1     No current facility-administered medications on file prior to visit.          Allergies   Allergen Reactions    Codeine Itching Objective:  Visit Vitals    /58 (BP 1 Location: Left arm, BP Patient Position: Sitting)    Pulse 72    Temp 97.8 °F (36.6 °C) (Oral)    Resp 18    Ht 5' 4\" (1.626 m)    Wt 119 lb (54 kg)    SpO2 97%    BMI 20.43 kg/m2      Constitutional: Well developed, nourished, no distress, alert, thin   CV: S1, S2.  RRR. No murmurs/rubs. No thrills palpated. No carotid bruits. Intact distal pulses. No edema. No aortic bruits. Pulm: No abnormalities on inspection. Clear to auscultation bilaterally. No wheezing/rhonchi. Normal effort. Neuro:  +R hemiparesis. +dysarthric.  +responses aren't appropriate and sometimes don't make sense. Psych: Appropriate affect.

## 2018-10-16 NOTE — PROGRESS NOTES
Wayne Fink is a [de-identified] y.o. female (: 1940) presenting to address:    Chief Complaint   Patient presents with    Depression       Vitals:    10/16/18 1300   BP: 120/58   Pulse: 72   Resp: 18   Temp: 97.8 °F (36.6 °C)   TempSrc: Oral   SpO2: 97%   Weight: 119 lb (54 kg)   Height: 5' 4\" (1.626 m)   PainSc:   0 - No pain       Learning Assessment:     Learning Assessment 10/20/2016   PRIMARY LEARNER Patient   PRIMARY LANGUAGE ENGLISH   LEARNER PREFERENCE PRIMARY DEMONSTRATION   ANSWERED BY patient   RELATIONSHIP SELF     Depression Screening:     PHQ over the last two weeks 2018   PHQ Not Done Active Diagnosis of Depression or Bipolar Disorder   Little interest or pleasure in doing things -   Feeling down, depressed, irritable, or hopeless -   Total Score PHQ 2 -     Fall Risk Assessment:     Fall Risk Assessment, last 12 mths 4/3/2018   Able to walk? Yes   Fall in past 12 months? Yes   Fall with injury? Yes   Number of falls in past 12 months 5   Fall Risk Score 6     Abuse Screening:     Abuse Screening Questionnaire 2018   Do you ever feel afraid of your partner? N   Are you in a relationship with someone who physically or mentally threatens you? N   Is it safe for you to go home? Y     Coordination of Care Questionaire:   1. Have you been to the ER, urgent care clinic since your last visit? Hospitalized since your last visit? NO    2. Have you seen or consulted any other health care providers outside of the [address] [place] [address] since your last visit? Include any pap smears or colon screening. NO    Advanced Directive:   1. Do you have an Advanced Directive? YES    2. Would you like information on Advanced Directives?  NO

## 2018-10-16 NOTE — PROGRESS NOTES
Immunization/s administered 10/16/2018 by Aundrea Penny LPN with guardian's consent. Patient tolerated procedure well. No reactions noted. FLUAD left deltoid.

## 2018-10-16 NOTE — MR AVS SNAPSHOT
[address] [place] [address]  [address] [facility] [id number] [phone] Patient: Almita Howe MRN: [id number]:[id number] Visit Information Date & Time Provider Department Dept. Phone Encounter #  
 10/16/2018  1:00 PM Meggan Maldonado, 3 [facility] [phone] [id number] Upcoming Health Maintenance Date Due Shingrix Vaccine Age 50> (1 of 2) 7/23/1988 GLAUCOMA SCREENING Q2Y 7/23/2003 Bone Densitometry (Dexa) Screening 7/23/2003 Pneumococcal 65+ Low/Medium Risk (2 of 2 - PCV13) 2/10/2018 Influenza Age 5 to Adult 8/1/2018 MEDICARE YEARLY EXAM 1/30/2019 DTaP/Tdap/Td series (2 - Td) 2/14/2028 Allergies as of 10/16/2018  Review Complete On: 10/16/2018 By: Meggan Maldonado MD  
  
 Severity Noted Reaction Type Reactions Codeine  10/20/2016    Itching Current Immunizations  Never Reviewed Name Date Influenza High Dose Vaccine PF 9/18/2017  1:45 PM  
 Influenza Vaccine 11/11/2017 12:00 AM  
 Influenza Vaccine (Tri) Adjuvanted  Incomplete Pneumococcal Polysaccharide (PPSV-23) 2/10/2017 12:00 AM, 11/14/2016 Tdap 2/14/2018 12:00 AM, 12/11/2017 12:00 AM, 2/10/2017 12:00 AM  
  
 Not reviewed this visit You Were Diagnosed With   
  
 Codes Comments Dementia with behavioral disturbance, unspecified dementia type    -  Primary ICD-10-CM: F03.91 
ICD-9-CM: 294.21 Sundowning     ICD-10-CM: F05 ICD-9-CM: CCY7343 Encounter for immunization     ICD-10-CM: E64 ICD-9-CM: V03.89 Vitals BP Pulse Temp Resp Height(growth percentile) Weight(growth percentile) 120/58 (BP 1 Location: Left arm, BP Patient Position: Sitting) 72 97.8 °F (36.6 °C) (Oral) 18 5' 4\" (1.626 m) 119 lb (54 kg) SpO2 BMI OB Status Smoking Status 97% 20.43 kg/m2 Hysterectomy Former Smoker Vitals History BMI and BSA Data  Body Mass Index Body Surface Area  
 20.43 kg/m 2 1.56 m 2  
  
  
 Preferred Pharmacy Pharmacy Name Phone Alfredo James, [address] [phone] Your Updated Medication List  
  
   
This list is accurate as of 10/16/18  1:32 PM.  Always use your most recent med list.  
  
  
  
  
 albuterol 90 mcg/actuation inhaler Commonly known as:  PROVENTIL HFA, VENTOLIN HFA, PROAIR HFA Take 2 Puffs by inhalation every four (4) hours as needed for Shortness of Breath (cough). ergocalciferol 50,000 unit capsule Commonly known as:  VITAMIN D2 Take 1 Cap by mouth every seven (7) days for 12 doses. fluticasone-salmeterol 250-50 mcg/dose diskus inhaler Commonly known as:  ADVAIR Take 1 Puff by inhalation two (2) times a day. lanolin-mineral oil Oil Commonly known as:  Ree Hedy Apply as needed to affected areas. Patient to apply LORazepam 0.5 mg tablet Commonly known as:  ATIVAN Take 1 Tab by mouth every eight (8) hours as needed for Anxiety. Max Daily Amount: 1.5 mg.  
  
 metoprolol tartrate 25 mg tablet Commonly known as:  LOPRESSOR Take 0.5 Tabs by mouth two (2) times a day. mirabegron ER 25 mg ER tablet Commonly known as:  MYRBETRIQ Take 1 Tab by mouth daily. Indications: Bladder Hyperactivity  
  
 naloxone 4 mg/actuation nasal spray Commonly known as:  [employer] Use 1 spray intranasally, then discard. Repeat with new spray every 2 min as needed for opioid overdose symptoms, alternating nostrils. senna-docusate 8.6-50 mg per tablet Commonly known as:  Eugune Alexanders Take 1 Tab by mouth daily. As needed for constipation  
  
 traMADol 50 mg tablet Commonly known as:  ULTRAM  
Daily and 30 min prior to PT (not to exceed q8hr frequency). umeclidinium 62.5 mcg/actuation inhaler Commonly known as:  INCRUSE ELLIPTA Take 1 Puff by inhalation daily. Indications: PREVENTION OF BRONCHOSPASMS WITH EMPHYSEMA  
  
 venlafaxine-SR 75 mg capsule Commonly known as:  EFFEXOR-XR Take 1 Cap by mouth daily. Prescriptions Printed Refills LORazepam (ATIVAN) 0.5 mg tablet 0 Sig: Take 1 Tab by mouth every eight (8) hours as needed for Anxiety. Max Daily Amount: 1.5 mg.  
 Class: Print Route: Oral  
  
We Performed the Following INFLUENZA VACCINE INACTIVATED (IIV), SUBUNIT, ADJUVANTED, IM [id number] CPT(R)] [place] IMMUNIZ ADMIN,1 SINGLE/COMB VAC/TOXOID [id number] CPT(R)] Introducing [facility] HEALTH SERVICES! [facility] introduces [employer] patient portal. Now you can access parts of your medical record, email your doctor's office, and request medication refills online. 1. In your internet browser, go to [email or web address] 2. Click on the First Time User? Click Here link in the Sign In box. You will see the New Member Sign Up page. 3. Enter your [employer] Access Code exactly as it appears below. You will not need to use this code after youve completed the sign-up process. If you do not sign up before the expiration date, you must request a new code. · [employer] Access Code: [email or web address] Expires: 11/12/2018  1:42 PM 
 
4. Enter the last four digits of your Social Security Number (xxxx) and Date of Birth (mm/dd/yyyy) as indicated and click Submit. You will be taken to the next sign-up page. 5. Create a [employer] ID. This will be your [employer] login ID and cannot be changed, so think of one that is secure and easy to remember. 6. Create a [employer] password. You can change your password at any time. 7. Enter your Password Reset Question and Answer. This can be used at a later time if you forget your password. 8. Enter your e-mail address. You will receive e-mail notification when new information is available in [address] 9. Click Sign Up. You can now view and download portions of your medical record. 10. Click the Download Summary menu link to download a portable copy of your medical information. If you have questions, please visit the Frequently Asked Questions section of the nookedt website. Remember, Qui[employer]lt is NOT to be used for urgent needs. For medical emergencies, dial 911. Now available from your iPhone and Android! Please provide this summary of care documentation to your next provider. Your primary care clinician is listed as Perla Basurto. If you have any questions after today's visit, please call [phone].

## 2018-10-19 NOTE — TELEPHONE ENCOUNTER
Skyler Treadwell from [facility] called stating they want to pre-cert the pt for more OT and skilled nursing. She has a one arm drive wheelchair now and they want to teach her how to transfer from it easily. Also they want to watch the scabs over the recent wounds she has.

## 2018-10-25 NOTE — TELEPHONE ENCOUNTER
[facility]) called requesting to speak w/ the nurse. They were already gone for the day. She is requesting a call in the morning in regards to the pt.  She did not want to give me any other information in regards to the reason to her call

## 2018-10-26 NOTE — TELEPHONE ENCOUNTER
Left msg for daughter to call back. Called [facility] regarding the fax stating pt had been combative. Per Hilda Walls, nursing director, pt has been combative toward staff and own personal aide the family hires. The ativan though listed as tid prn has typically only been given once per day. They try to not give it as much since it tends to make her confused and fall more. Per Hilda Walls, the patient was given Ativan on 10/25 after the incident of combativeness.

## 2018-11-02 NOTE — TELEPHONE ENCOUNTER
Staff nurse called stating pt's O2 dropped to 79 during PT. They were calling for advice. I asked them to recheck (she had now sat 20 min) and call back with new reading so the dr could advise. The facility had not called back in one hour. I called the [facility] back. They said they have no way to check on her O2 (they say the only pulse ox machines are in the PT dept and they are locked up and gone for the day). They say she is not in distress. I gave them the dr recommendation of please recheck her oxygen, if 90 or under or in distress please send to ED. If above 90 and not in distress just schedule follow up with pcp.

## 2018-11-05 NOTE — PROGRESS NOTES
Assessment/Plan:    1. Dementia with behavioral disturbance, unspecified dementia type  -worsening behavioral disturbances. Pain no longer seems to be contributing and her pain is adequately managed with tramadol. Will d/c ativan. Start seroquel qhs.  F/u in 3 weeks for family conference. I have discussed that the patient needs a higher level of care (SNF). She needs max assistance for transfers, no longer feeding herself, needs wound care. She may qualify for bridge hospice given rapid declining status. - QUEtiapine (SEROQUEL) 25 mg tablet; Take 1 Tab by mouth nightly. Dispense: 90 Tab; Refill: 0  A total of 35 minutes was spent with the patient of which 30 minutes was spent in counseling the patient and family regarding poor prognosis, risk of antipsychotic use in elderly (increased mortality), need for SNF and hospice discussion. The plan was discussed with the patient. The patient verbalized understanding and is in agreement with the plan. All medication potential side effects were discussed with the patient. Health Maintenance:   Health Maintenance   Topic Date Due    Shingrix Vaccine Age 49> (1 of 2) 07/23/1988    GLAUCOMA SCREENING Q2Y  07/23/2003    Bone Densitometry (Dexa) Screening  07/23/2003    Pneumococcal 65+ Low/Medium Risk (2 of 2 - PCV13) 02/10/2018    MEDICARE YEARLY EXAM  01/30/2019    DTaP/Tdap/Td series (2 - Td) 02/14/2028    Influenza Age 5 to Adult  Completed       Sonia Goins is a [de-identified] y.o. female and presents with Dementia     Subjective:  Pt continues to decline significantly. She is accompanied by sister. She reports that she can no longer feed herself, needs 2 people to transfer her into the shower. Daughter states, \"it's like someone too half of my mom out\". She can't follow commands any longer. She's having worsening dysarthria. She continues to have significant behavioral issues. She's volatile, angry, hostile.   She has even become physically combative. The community she resides ([facility]) has not been dosing bid really (only giving it to her qhs). She states she has requested ativan and they \"wait too long\" to give it and the patient becomes \"worked up\" and agitated b/c she ruminates on things. Daughter states it can happen at any time during the day. She's had two falls since our last visit. ER did CT head which didn't show anything acute. xrays were unremarkable. Reportedly during ER last week, she was given IV ativan and she started picking at things and was missing her mouth when trying to feed herself. The daughter reports that tramadol is adequately controlling her pain. ROS:  Constitutional: No recent weight change. No weakness/fatigue. No f/c. Cardiovascular: No CP/palpitations. No ALY/orthopnea/PND. Respiratory: No cough/sputum, dyspnea, wheezing. Gastointestinal: No dysphagia, reflux. No n/v. No constipation/diarrhea. No melena/rectal bleeding. The problem list was updated as a part of today's visit. 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    Paroxysmal atrial fibrillation (HCC) I48.0    Contraindication to anticoagulation therapy Z53.09    Frequent falls R29.6    Right hand weakness R29.898    Contracture, right hand M24.541    Pure hypercholesterolemia E78.00    Full code status Z78.9    Subclinical hypothyroidism E03.9    OAB (overactive bladder) N32.81    Vitamin D deficiency E55.9    First degree AV block I44.0    Chronic cough R05    History of hemorrhagic stroke with residual hemiplegia (HCC) I69.359    History of vertebral compression fracture Z87.81    Centrilobular emphysema (HCC) J43.2    Recurrent depression (HCC) F33.9    Expressive aphasia R47.01    Memory impairment R41.3    Decubitus ulcer of left heel, stage 1 L89.621    Dependent for mobility Z74.1       The PSH, FH were reviewed. SH:  Social History     Tobacco Use    Smoking status: Former Smoker     Years: 10.00    Smokeless tobacco: Never Used   Substance Use Topics    Alcohol use: Yes     Alcohol/week: 0.6 oz     Types: 1 Glasses of wine per week    Drug use: Not on file       Medications/Allergies:  Current Outpatient Medications on File Prior to Visit   Medication Sig Dispense Refill    ergocalciferol (VITAMIN D2) 50,000 unit capsule Take 1 Cap by mouth every seven (7) days for 12 doses. 12 Cap 0    fluticasone-salmeterol (ADVAIR) 250-50 mcg/dose diskus inhaler Take 1 Puff by inhalation two (2) times a day. 3 Inhaler 3    lanolin-mineral oil (THERADERM) oil Apply as needed to affected areas. Patient to apply 420 mL 6    albuterol (PROVENTIL HFA, VENTOLIN HFA, PROAIR HFA) 90 mcg/actuation inhaler Take 2 Puffs by inhalation every four (4) hours as needed for Shortness of Breath (cough). 1 Inhaler 1    LORazepam (ATIVAN) 0.5 mg tablet Take 1 Tab by mouth every eight (8) hours as needed for Anxiety. Max Daily Amount: 1.5 mg. 90 Tab 0    metoprolol tartrate (LOPRESSOR) 25 mg tablet Take 0.5 Tabs by mouth two (2) times a day. 90 Tab 1    venlafaxine-SR (EFFEXOR-XR) 75 mg capsule Take 1 Cap by mouth daily. 90 Cap 0    naloxone (NARCAN) 4 mg/actuation nasal spray Use 1 spray intranasally, then discard. Repeat with new spray every 2 min as needed for opioid overdose symptoms, alternating nostrils. 1 Each 1    traMADol (ULTRAM) 50 mg tablet Daily and 30 min prior to PT (not to exceed q8hr frequency). 30 Tab 0    umeclidinium (INCRUSE ELLIPTA) 62.5 mcg/actuation inhaler Take 1 Puff by inhalation daily. Indications: PREVENTION OF BRONCHOSPASMS WITH EMPHYSEMA 30 Inhaler 3    senna-docusate (PERICOLACE) 8.6-50 mg per tablet Take 1 Tab by mouth daily. As needed for constipation      mirabegron ER (MYRBETRIQ) 25 mg ER tablet Take 1 Tab by mouth daily.  Indications: Bladder Hyperactivity 90 Tab 0     No current facility-administered medications on file prior to visit. Allergies   Allergen Reactions    Codeine Itching       Objective:  Visit Vitals  /80 (BP 1 Location: Left arm, BP Patient Position: Sitting)   Pulse 60   Temp 97.5 °F (36.4 °C) (Oral)   Resp 16   Ht 5' 4\" (1.626 m)   SpO2 98%   BMI 20.43 kg/m²      Constitutional: Well developed, nourished, no distress, alert   CV: S1, S2.  RRR. No murmurs/rubs. No thrills palpated. Pulm: No abnormalities on inspection. Clear to auscultation bilaterally. No wheezing/rhonchi. Normal effort. MS: R hemiparesis with contracture RUE. Neuro: Speech dysarthric and she often uses the wrong words. Unable to follow commands reliably (asked to point to chin and she pointed to ear)   Psych: Appropriate affect. Impaired judgement and insight.   Short-term memory impaired

## 2018-11-05 NOTE — TELEPHONE ENCOUNTER
[facility] [address] [place] [address] said she was with patient today and they cannot get accurate pulse ox any higher than 61. She said her and PT both tried, fingers are cold. They said she is not in distress, no SOB. They just wanted us to be aware.

## 2018-11-05 NOTE — PROGRESS NOTES
Flakita Greer is a [de-identified] y.o. female (: 1940) presenting to address:    Chief Complaint   Patient presents with    Dementia       Vitals:    18 1300   BP: 142/80   Pulse: 60   Resp: 16   Temp: 97.5 °F (36.4 °C)   TempSrc: Oral   Height: 5' 4\" (1.626 m)   PainSc:   0 - No pain       Learning Assessment:     Learning Assessment 10/20/2016   PRIMARY LEARNER Patient   PRIMARY LANGUAGE ENGLISH   LEARNER PREFERENCE PRIMARY DEMONSTRATION   ANSWERED BY patient   RELATIONSHIP SELF     Depression Screening:     PHQ over the last two weeks 2018   PHQ Not Done Active Diagnosis of Depression or Bipolar Disorder   Little interest or pleasure in doing things -   Feeling down, depressed, irritable, or hopeless -   Total Score PHQ 2 -     Fall Risk Assessment:     Fall Risk Assessment, last 12 mths 4/3/2018   Able to walk? Yes   Fall in past 12 months? Yes   Fall with injury? Yes   Number of falls in past 12 months 5   Fall Risk Score 6     Abuse Screening:     Abuse Screening Questionnaire 2018   Do you ever feel afraid of your partner? N   Are you in a relationship with someone who physically or mentally threatens you? N   Is it safe for you to go home? Y     Coordination of Care Questionaire:   1. Have you been to the ER, urgent care clinic since your last visit? Hospitalized since your last visit?  [facility] 10/28/18, 18    2. Have you seen or consulted any other health care providers outside of the [address] [place] [address] since your last visit? Include any pap smears or colon screening. NO    Advanced Directive:   1. Do you have an Advanced Directive? YES    2. Would you like information on Advanced Directives?  NO

## 2018-11-05 NOTE — TELEPHONE ENCOUNTER
Has appt today.   But in future, they will need to send her to hospital if vitals are significantly abnormal.

## 2018-11-16 PROBLEM — R23.8: Status: ACTIVE | Noted: 2018-01-01

## 2018-11-29 NOTE — TELEPHONE ENCOUNTER
I spoke with [facility] Jose Luis/MILLIE regarding patient. There are concerns she is not getting level of care needed in current facility. I discussed that I agreed that she needs higher level of care b/c she is max assist for transfers and no longer able to feed herself. In fact, I have had this discussion a few times with sister and daughter. I discussed that I was unable to assess capacity due to dysarthria, although I do suspect underlying dementia.

## 2018-12-07 NOTE — TELEPHONE ENCOUNTER
Bette Kincaid [facility] called stating pt fell again 12/5/18 per daughter and has a new skin tear R shin. Pcp made aware.

## 2018-12-11 PROBLEM — K59.00 CONSTIPATION: Status: ACTIVE | Noted: 2018-01-01

## 2018-12-11 PROBLEM — R05.3 CHRONIC COUGH: Status: RESOLVED | Noted: 2017-08-14 | Resolved: 2018-01-01

## 2018-12-11 PROBLEM — F03.90 DEMENTIA (HCC): Status: ACTIVE | Noted: 2018-01-01

## 2018-12-11 NOTE — PROGRESS NOTES
Assessment/Plan:    1. 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 We have tried numerous things to help with behavior issues (combative/aggressiveness). Failed zoloft, ativan, seroquel, scheduled tramadol for pain management. Will change effexor to celexa as that has data to support use with agitation in dementia patients. I did cross taper instructions and had nurse call to verbalize instructions with nursing staff. Additionally, namenda has shown to be beneficial for agitation in dementia patients, this is to be started 1/1/19 after pt has been on celexa and off effexor x 1 week. May need to f/u with Dr. Corazon Hamlin regarding ongoing behavioral/aggression issues despite traditional regimens. She has declined rapidly in the past year. Per daughter, the sister (who has POA) has no plans on moving the patient to a higher level of care despite my recommendations. The patient may be an appropriate candidate for bridge hospice. Consider hospice consult to discuss with patient's sister and daughter. Pt to f/u with me in 6 weeks. For now, I am d/c'ing tramadol and seroquel, due to interactions. I am concerned with her level of sedation at present after single dose tramadol. Will consider d/c inhalers as pt not likely able to get in good breath/appropriately use. The daughter requested scheduled ativan or hydroxyzine. I discussed that I am hesitant to order scheduled sedating meds in a patient who can not verbalize how she is feeling/refuse meds. 2. Recurrent depression ([facility] [address]  See #1. 3. Constipation, unspecified constipation type  -scheduled daily pericolace. May need to add miralax. - senna-docusate (PERICOLACE) 8.6-50 mg per tablet; Take 1 Tab by mouth daily. Dispense: 90 Tab; Refill: 0    4. Generalized abdominal pain- ?due to #3. KUB done.   - XR ABD (KUB); Future  - XR PELV 1 OR 2 V; Future    A total of 60 minutes was spent with the patient of which 55 minutes was spent in counseling regarding behavioral issues and possible regimens for treatment. All questions answered. The plan was discussed with the patient. The patient verbalized understanding and is in agreement with the plan. All medication potential side effects were discussed with the patient. Health Maintenance:   Health Maintenance   Topic Date Due    Shingrix Vaccine Age 49> (1 of 2) 07/23/1988    GLAUCOMA SCREENING Q2Y  07/23/2003    Bone Densitometry (Dexa) Screening  07/23/2003    Pneumococcal 65+ Low/Medium Risk (2 of 2 - PCV13) 02/10/2018    MEDICARE YEARLY EXAM  01/30/2019    DTaP/Tdap/Td series (2 - Td) 02/14/2028    Influenza Age 9 to Adult  Completed       Bjorn Bruno is a [de-identified] y.o. female and presents with Dementia     Subjective:  Pt has worsening dementia with behavioral sx. APS is involved in her case. She is no longer able to feed herself and needs assistance with ADLS. She continues to fall b/c she forgets she can't walk and will often attempt to go to bathroom or get out of bed w/o assistance. Daughter presents most of history, pt is sleeping (pt reportedly took a tramadol b/c she was crying in pain prior to coming to office). Daughter reports she's constantly agitated and restless. She is competitive and hitting staff members. She is constantly crying out \"help me\". She is picking at things on herself and attempt to put it in her mouth. Daughter is also concerned the patient may have had another stroke. They have noticed worsening expressive aphasia, L sided weakness and increased tone of BLE.     ROS:  Constitutional: No recent weight change. No weakness/fatigue. No f/c. Cardiovascular: No CP/palpitations. No ALY/orthopnea/PND. Respiratory: No cough/sputum, dyspnea, wheezing. Gastointestinal:  No n/v.  + constipation/no diarrhea.   No melena/rectal bleeding. The problem list was updated as a part of today's visit. 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    Paroxysmal atrial fibrillation (HCC) I48.0    Contraindication to anticoagulation therapy Z53.09    Frequent falls R29.6    Right hand weakness R29.898    Contracture, right hand M24.541    Pure hypercholesterolemia E78.00    Full code status Z78.9    Subclinical hypothyroidism E03.9    OAB (overactive bladder) N32.81    Vitamin D deficiency E55.9    First degree AV block I44.0    Chronic cough R05    History of hemorrhagic stroke with residual hemiplegia ([facility]) I69.359    History of vertebral compression fracture Z87.81    Centrilobular emphysema ([facility]) J43.2    Recurrent depression ([facility]) F33.9    Expressive aphasia R47.01    Memory impairment R41.3    Decubitus ulcer of left heel, stage 1 L89.621    Dependent for mobility Z74.1    Age-related skin fragility R23.8       The PSH, FH were reviewed. SH:  Social History     Tobacco Use    Smoking status: Former Smoker     Years: 10.00    Smokeless tobacco: Never Used   Substance Use Topics    Alcohol use: Yes     Alcohol/week: 0.6 oz     Types: 1 Glasses of wine per week    Drug use: Not on file       Medications/Allergies:  Current Outpatient Medications on File Prior to Visit   Medication Sig Dispense Refill    fluticasone-salmeterol (ADVAIR) 250-50 mcg/dose diskus inhaler Take 1 Puff by inhalation two (2) times a day. 3 Inhaler 3    QUEtiapine (SEROQUEL) 25 mg tablet Take 1 Tab by mouth nightly. 90 Tab 0    metoprolol tartrate (LOPRESSOR) 25 mg tablet Take 0.5 Tabs by mouth two (2) times a day. 90 Tab 1    venlafaxine-SR (EFFEXOR-XR) 75 mg capsule Take 1 Cap by mouth daily. 90 Cap 0    naloxone (NARCAN) 4 mg/actuation nasal spray Use 1 spray intranasally, then discard.  Repeat with new spray every 2 min as needed for opioid overdose symptoms, alternating nostrils. 1 Each 1    traMADol (ULTRAM) 50 mg tablet Daily and 30 min prior to PT (not to exceed q8hr frequency). 30 Tab 0    umeclidinium (INCRUSE ELLIPTA) 62.5 mcg/actuation inhaler Take 1 Puff by inhalation daily. Indications: PREVENTION OF BRONCHOSPASMS WITH EMPHYSEMA 30 Inhaler 3    lanolin-mineral oil (THERADERM) oil Apply as needed to affected areas. Patient to apply 420 mL 6    senna-docusate (PERICOLACE) 8.6-50 mg per tablet Take 1 Tab by mouth daily. As needed for constipation      mirabegron ER (MYRBETRIQ) 25 mg ER tablet Take 1 Tab by mouth daily. Indications: Bladder Hyperactivity 90 Tab 0    albuterol (PROVENTIL HFA, VENTOLIN HFA, PROAIR HFA) 90 mcg/actuation inhaler Take 2 Puffs by inhalation every four (4) hours as needed for Shortness of Breath (cough). 1 Inhaler 1     No current facility-administered medications on file prior to visit. Allergies   Allergen Reactions    Codeine Itching       Objective:  Visit Vitals  /70 (BP 1 Location: Left arm, BP Patient Position: Sitting)   Pulse 89   Temp 98.1 °F (36.7 °C) (Oral)   Resp 20   Ht 5' 4\" (1.626 m)   SpO2 99%   BMI 20.43 kg/m²      Constitutional: Well developed, nourished, no distress, somnolent   CV: S1, S2.  RRR. No murmurs/rubs. Pulm: No abnormalities on inspection. Clear to auscultation bilaterally. No wheezing/rhonchi. Normal effort. GI: Soft, pt winced to generalized abdominal palpation, nondistended. hypoactive bowel sounds. MS: R arm contracted. Moves LLE spontaneously. Neuro: Arousable, speech dysarthric (chronic), intermittently saying \"help me\". Unable to answer appropriately (chronic).    Psych: Unable to assess

## 2018-12-11 NOTE — PROGRESS NOTES
Flakita Greer is a [de-identified] y.o. female (: 1940) presenting to address:    Chief Complaint   Patient presents with    Dementia       Vitals:    18 1318   BP: 120/70   Pulse: 89   Resp: 20   Temp: 98.1 °F (36.7 °C)   TempSrc: Oral   SpO2: 99%   Height: 5' 4\" (1.626 m)   PainSc:   0 - No pain       Learning Assessment:     Learning Assessment 10/20/2016   PRIMARY LEARNER Patient   PRIMARY LANGUAGE ENGLISH   LEARNER PREFERENCE PRIMARY DEMONSTRATION   ANSWERED BY patient   RELATIONSHIP SELF     Depression Screening:     PHQ over the last two weeks 2018   PHQ Not Done Active Diagnosis of Depression or Bipolar Disorder   Little interest or pleasure in doing things -   Feeling down, depressed, irritable, or hopeless -   Total Score PHQ 2 -     Fall Risk Assessment:     Fall Risk Assessment, last 12 mths 2018   Able to walk? No   Fall in past 12 months? -   Fall with injury? -   Number of falls in past 12 months -   Fall Risk Score -     Abuse Screening:     Abuse Screening Questionnaire 2018   Do you ever feel afraid of your partner? N   Are you in a relationship with someone who physically or mentally threatens you? N   Is it safe for you to go home? Y     Coordination of Care Questionaire:   1. Have you been to the ER, urgent care clinic since your last visit? Hospitalized since your last visit? NO    2. Have you seen or consulted any other health care providers outside of the [address] [place] [address] since your last visit? Include any pap smears or colon screening. NO    Advanced Directive:   1. Do you have an Advanced Directive? YES    2. Would you like information on Advanced Directives?  NO

## 2018-12-13 NOTE — TELEPHONE ENCOUNTER
Left message on the patient's daughter's voicemail regarding the results of the x-ray and the new medication to be added to the patients medication regiment. Requested that if there are any questions or concerns to please call the office.

## 2018-12-13 NOTE — TELEPHONE ENCOUNTER
----- Message from Philip Berg MD sent at 12/12/2018  6:30 PM EST -----  Tell daughter that pts X-ray shows constipation.  Sent in miralax to add to daily regimen

## 2019-01-01 ENCOUNTER — TELEPHONE (OUTPATIENT)
Dept: FAMILY MEDICINE CLINIC | Age: 79
End: 2019-01-01

## 2023-02-12 NOTE — TELEPHONE ENCOUNTER
Left message for patient to call office.
Princess Back notified.
Sushila Devi from [facility] has been treating the Pt for Wound care. She said that the wounds have not completely healed and would need to continue to provide care. She wanted to make sure that it is still ok to treat the PT.  Winnie's phone number is  [phone]
Yes, continue care until wound healed
I have reviewed and confirmed nurses' notes for patient's medications, allergies, medical history, and surgical history.

## 2023-04-04 NOTE — PATIENT INSTRUCTIONS
I called pharmacy stated that Stiolto was denied and Bevespi and Anoro were alternatives. Pharmacy stated both were covered. I spoke to pt and she stated that she hasn't tried either alternatives. Can this be switched to Bevespi or Anoro? Send to [facility] in [place]. Please advise.   Umeclidinium (By breathing)   Umeclidinium ([id number]-DIN-[id number]-um)  Treats chronic obstructive pulmonary disease (COPD). Brand Name(s): Incruse Ellipta   There may be other brand names for this medicine. When This Medicine Should Not Be Used: This medicine is not right for everyone. Do not use it if you had an allergic reaction to umeclidinium or milk proteins. How to Use This Medicine:   Powder  · Your doctor will tell you how much medicine to use. Do not use more than directed. Use this medicine at the same time each day. · To use:   ¨ Open the cover of the inhaler until you hear a click. The inhaler is now ready to use. Do not close the cover until you have taken your dose. If you open and close the cover without inhaling the dose, you will lose the medicine. Do not take more than 1 puff per day. ¨ Before you inhale your dose, breathe out fully. Try to empty your lungs as much as possible. ¨ Place the mouthpiece of the inhaler in your mouth and breathe in a steady, deep breath. Do not breathe in through your nose. ¨ Remove the inhaler from your mouth. Hold your breath for about 3 to 4 seconds, then breathe out slowly. ¨ The inhaler has a window that shows the number of doses that are left. The counter will turn red when the inhaler has fewer than 10 doses left. This will remind you to refill your prescription. · Read and follow the patient instructions that come with this medicine. Talk to your doctor or pharmacist if you have any questions. · Missed dose: Take a dose as soon as you remember. If it is almost time for your next dose, wait until then and take a regular dose. Do not take extra medicine to make up for a missed dose. · Store the medicine in a closed container at room temperature, away from heat, moisture, and direct light. Keep the medicine in the foil tray. Do not open it until you are ready to use the inhaler for the first time.  Throw away the medicine 6 weeks after you open it or when the counter reads 0. Drugs and Foods to Avoid:   Ask your doctor or pharmacist before using any other medicine, including over-the-counter medicines, vitamins, and herbal products. · Some medicines can affect how umeclidinium works. Tell your doctor if you are using an anticholinergic medicine, such as the following:   ¨ Aclidinium  ¨ Atropine  ¨ Ipratropium  ¨ Tiotropium  Warnings While Using This Medicine:   · Tell your doctor if you are pregnant or breastfeeding, or if you have glaucoma, heart problems, trouble urinating, an enlarged prostate, or a bladder blockage. · This medicine may cause the following problems:  ¨ Increased trouble breathing (may be life-threatening)  ¨ New or worse narrow-angle glaucoma  ¨ New or worse trouble urinating  · Do not use this medicine to treat a COPD attack. Your doctor may prescribe another medicine that you should use when you have a COPD flare-up. · Your doctor will check your progress and the effects of this medicine at regular visits. Keep all appointments. · Keep all medicine out of the reach of children. Never share your medicine with anyone. Possible Side Effects While Using This Medicine:   Call your doctor right away if you notice any of these side effects:  · Allergic reaction: Itching or hives, swelling in your face or hands, swelling or tingling in your mouth or throat, chest tightness, trouble breathing  · Decrease in how much or how often you urinate, difficult or painful urination  · Eye pain, blurred vision, other vision changes  · Worse breathing problems  If you notice other side effects that you think are caused by this medicine, tell your doctor. Call your doctor for medical advice about side effects. You may report side effects to FDA at 9-993-FDA-3302  © 2017 [employer] [employer] Information is for End User's use only and may not be sold, redistributed or otherwise used for commercial purposes.   The above information is an  only. It is not intended as medical advice for individual conditions or treatments. Talk to your doctor, nurse or pharmacist before following any medical regimen to see if it is safe and effective for you.